# Patient Record
Sex: FEMALE | Race: WHITE | Employment: PART TIME | URBAN - METROPOLITAN AREA
[De-identification: names, ages, dates, MRNs, and addresses within clinical notes are randomized per-mention and may not be internally consistent; named-entity substitution may affect disease eponyms.]

---

## 2017-04-10 ENCOUNTER — TELEPHONE (OUTPATIENT)
Dept: GASTROENTEROLOGY | Facility: CLINIC | Age: 67
End: 2017-04-10

## 2017-04-10 NOTE — TELEPHONE ENCOUNTER
Dr. Ca Black- you last saw pt for CLN on 10/16/15; please see below email from pt with update and advise; thanks!

## 2017-04-10 NOTE — TELEPHONE ENCOUNTER
Danielle Sanford - Appointment Request >','<< Less Detail',event)\" href=\"javascript:;\">More Detail >>     Appointment Request     Tamar Shaffer     Sent: Mon April 10, 2017  6:03 AM      Tamar Shaffer     MRN: [id number] : 1950     Pt Work

## 2017-04-12 RX ORDER — MESALAMINE 400 MG/1
800 CAPSULE, DELAYED RELEASE ORAL
Qty: 180 CAPSULE | Refills: 11 | Status: SHIPPED | OUTPATIENT
Start: 2017-04-12 | End: 2017-06-03 | Stop reason: CLARIF

## 2017-04-12 NOTE — TELEPHONE ENCOUNTER
Pt is contacted to verify correct pharmacy for Prednisone rx.; this is [facility] in [place]; pt will book F/U appt via [address] within 2 months.

## 2017-04-12 NOTE — TELEPHONE ENCOUNTER
Pt contacted, states she has diarrhea, rectal bleeding, Thinks she is having a flare. She stopped her imuran and delzicol \" a long time ago\".  GI RN please call in Rx for prednisone 10mg # 90 si po daily x 2 weeks, then 3 po daily x 10 days, then 2 po

## 2017-04-12 NOTE — TELEPHONE ENCOUNTER
Dr. Sharita Walker- please adjust quantity on Prednisone 10 mg tablets upwards since #90 is not enough for this course; thanks!

## 2017-04-14 ENCOUNTER — TELEPHONE (OUTPATIENT)
Dept: GASTROENTEROLOGY | Facility: CLINIC | Age: 67
End: 2017-04-14

## 2017-04-14 DIAGNOSIS — D64.9 ANEMIA, UNSPECIFIED: Primary | ICD-10-CM

## 2017-04-19 NOTE — TELEPHONE ENCOUNTER
Pt confirmed below communication and is made aware that message will be sent to Dr. Vangie Perera for lab orders.

## 2017-04-21 NOTE — TELEPHONE ENCOUNTER
Orders have already been entered for cbc, ferritin,cmp, tsh, vitamin D, . Iron and TIBC added. I do not believe LDH is indicated at this time.

## 2017-04-26 ENCOUNTER — PATIENT MESSAGE (OUTPATIENT)
Dept: DERMATOLOGY CLINIC | Facility: CLINIC | Age: 67
End: 2017-04-26

## 2017-04-26 NOTE — TELEPHONE ENCOUNTER
From: Prashant Parent  To: Jennifer Pastrana MD  Sent: 4/26/2017 3:50 PM CDT  Subject: Prescription Question    Hi Dr Shefali Mar,  I'm near the end of a little flare with my ulcerative colitis & have been monitoring my ankle area closely because I feel some i

## 2017-04-27 ENCOUNTER — PATIENT MESSAGE (OUTPATIENT)
Dept: DERMATOLOGY CLINIC | Facility: CLINIC | Age: 67
End: 2017-04-27

## 2017-04-27 NOTE — TELEPHONE ENCOUNTER
From: Elodia Ackerman  To: Susan Casiano MD  Sent: 4/27/2017 12:15 PM CDT  Subject: Prescription Question    Attn: Ramirez Contreras  I could not reply to your email response of today (in the format it was sent) recommending \"applying clobetasol ointment t

## 2017-04-27 NOTE — TELEPHONE ENCOUNTER
Yes- would definitely recommend use of clobetasol oint to the area bid- if pt needs refill may give 30 gm + 1 rf

## 2017-04-29 ENCOUNTER — LAB ENCOUNTER (OUTPATIENT)
Dept: LAB | Facility: HOSPITAL | Age: 67
End: 2017-04-29
Attending: INTERNAL MEDICINE
Payer: COMMERCIAL

## 2017-04-29 DIAGNOSIS — D64.9 ANEMIA, UNSPECIFIED: ICD-10-CM

## 2017-04-29 DIAGNOSIS — Z00.00 HEALTHCARE MAINTENANCE: ICD-10-CM

## 2017-04-29 PROCEDURE — 83540 ASSAY OF IRON: CPT

## 2017-04-29 PROCEDURE — 80053 COMPREHEN METABOLIC PANEL: CPT

## 2017-04-29 PROCEDURE — 84439 ASSAY OF FREE THYROXINE: CPT

## 2017-04-29 PROCEDURE — 84443 ASSAY THYROID STIM HORMONE: CPT

## 2017-04-29 PROCEDURE — 84466 ASSAY OF TRANSFERRIN: CPT

## 2017-04-29 PROCEDURE — 36415 COLL VENOUS BLD VENIPUNCTURE: CPT

## 2017-04-29 PROCEDURE — 85025 COMPLETE CBC W/AUTO DIFF WBC: CPT

## 2017-04-29 PROCEDURE — 80061 LIPID PANEL: CPT

## 2017-04-29 PROCEDURE — 82728 ASSAY OF FERRITIN: CPT

## 2017-05-01 ENCOUNTER — TELEPHONE (OUTPATIENT)
Dept: GASTROENTEROLOGY | Facility: CLINIC | Age: 67
End: 2017-05-01

## 2017-05-25 ENCOUNTER — PATIENT MESSAGE (OUTPATIENT)
Dept: INTERNAL MEDICINE CLINIC | Facility: CLINIC | Age: 67
End: 2017-05-25

## 2017-05-27 ENCOUNTER — HOSPITAL ENCOUNTER (EMERGENCY)
Facility: HOSPITAL | Age: 67
Discharge: HOME | End: 2017-05-27
Attending: EMERGENCY MEDICINE
Payer: COMMERCIAL

## 2017-05-27 ENCOUNTER — TELEPHONE (OUTPATIENT)
Dept: INTERNAL MEDICINE CLINIC | Facility: CLINIC | Age: 67
End: 2017-05-27

## 2017-05-27 ENCOUNTER — OFFICE VISIT (OUTPATIENT)
Dept: DERMATOLOGY CLINIC | Facility: CLINIC | Age: 67
End: 2017-05-27

## 2017-05-27 VITALS
WEIGHT: 132 LBS | HEART RATE: 69 BPM | SYSTOLIC BLOOD PRESSURE: 100 MMHG | TEMPERATURE: 99 F | HEIGHT: 68 IN | BODY MASS INDEX: 20 KG/M2 | DIASTOLIC BLOOD PRESSURE: 52 MMHG | RESPIRATION RATE: 17 BRPM | OXYGEN SATURATION: 98 %

## 2017-05-27 DIAGNOSIS — N81.3 COMPLETE UTERINE PROLAPSE: Primary | ICD-10-CM

## 2017-05-27 DIAGNOSIS — T38.0X5A STEROID ACNE: Primary | ICD-10-CM

## 2017-05-27 DIAGNOSIS — L70.8 STEROID ACNE: Primary | ICD-10-CM

## 2017-05-27 DIAGNOSIS — K59.00 CONSTIPATION, UNSPECIFIED CONSTIPATION TYPE: ICD-10-CM

## 2017-05-27 PROCEDURE — 80048 BASIC METABOLIC PNL TOTAL CA: CPT | Performed by: EMERGENCY MEDICINE

## 2017-05-27 PROCEDURE — 99213 OFFICE O/P EST LOW 20 MIN: CPT | Performed by: DERMATOLOGY

## 2017-05-27 PROCEDURE — 85025 COMPLETE CBC W/AUTO DIFF WBC: CPT | Performed by: EMERGENCY MEDICINE

## 2017-05-27 PROCEDURE — 99284 EMERGENCY DEPT VISIT MOD MDM: CPT

## 2017-05-27 PROCEDURE — 99212 OFFICE O/P EST SF 10 MIN: CPT | Performed by: DERMATOLOGY

## 2017-05-27 PROCEDURE — 36415 COLL VENOUS BLD VENIPUNCTURE: CPT

## 2017-05-27 RX ORDER — SULFACETAMIDE SODIUM 100 MG/ML
1 LOTION TOPICAL DAILY
Qty: 1 BOTTLE | Refills: 3 | Status: SHIPPED | OUTPATIENT
Start: 2017-05-27 | End: 2017-09-21 | Stop reason: ALTCHOICE

## 2017-05-27 RX ORDER — CLINDAMYCIN PHOSPHATE AND BENZOYL PEROXIDE 10; 50 MG/G; MG/G
1 GEL TOPICAL DAILY
Qty: 1 TUBE | Refills: 3 | Status: SHIPPED | OUTPATIENT
Start: 2017-05-27 | End: 2017-09-21 | Stop reason: ALTCHOICE

## 2017-05-27 RX ORDER — POLYETHYLENE GLYCOL 3350 17 G/17G
17 POWDER, FOR SOLUTION ORAL DAILY PRN
Qty: 12 EACH | Refills: 0 | Status: SHIPPED | OUTPATIENT
Start: 2017-05-27 | End: 2017-06-03 | Stop reason: CLARIF

## 2017-05-27 NOTE — ED NOTES
Care assumed from triage pt verbalizes concern for uterine prolapse secondary to constipation/Chron's flare.  Currently on prednisone taper

## 2017-05-27 NOTE — PROGRESS NOTES
Past Medical History   Diagnosis Date   • Ulcerative colitis ([facility] [address] 1990s, 2001   • Colon polyps    • Benign tumor of breast 1969     biopsy   • Breast lump      biopsy, benign   • Nasal fracture    • Pyoderma          Past Surgical History    BREAST SURGER

## 2017-05-27 NOTE — PROGRESS NOTES
HPI:     Chief Complaint     Rash        HPI     Rash    Additional comments: check red bumpy eruption in face, had this for about 7 days, itching at the beginning       Last edited by Letitia Pinzon RN on 5/27/2017  9:19 AM. (History)         of note shamir DAILY BEFORE MEALS Disp: 180 capsule Rfl: 11   mesalamine (DELZICOL) 400 MG Oral Capsule Delayed Release Take 2 capsules (800 mg total) by mouth 3 (three) times daily before meals.  Disp: 180 capsule Rfl: 11   mesalamine (DELZICOL) 400 MG Oral Capsule Delay h/o       PHYSICAL EXAM:   Patient is alert and oriented and appears their stated age. They are well-nourished. Exam is remarkable for the following-  1.   There are some resolving what appeared to be inflammatory somewhat acneiform papules on the same st

## 2017-05-27 NOTE — TELEPHONE ENCOUNTER
From: Reddy Martino  To: Linda Mohamud MD  Sent: 5/25/2017 2:46 AM CDT  Subject: Visit Follow-up Question    Dr Fabiano Tyson, [place] late [place].  During this vacation time I managed to push some body part out due to so

## 2017-05-27 NOTE — ED INITIAL ASSESSMENT (HPI)
Had flare up of ulcerative colitis and was straining with bowel movements, thinks she caused a reoccurrence of uterine prolapse, sent by Dr Blanca Finney

## 2017-05-27 NOTE — TELEPHONE ENCOUNTER
Elisabet Harrell      To     [facility] Clinical Staff             Sent     5/25/2017  2:46 AM            Dr Tanesha Davidson, I am [place] late afternoon today from [place].  During this vacation time I managed to push some body part out due to so much strai needs evaluation however long weekend ahead and no access with provider. Pt directed to seek ED for immediate care and F/U after Holiday w/ PCP.   pt agreeable to plan of care    Patient provided with clinic contact information, hours of operation and will

## 2017-05-28 NOTE — ED NOTES
Discharged home with plan to follow up with GYN/GI as indicated. Alert and interactive. Hemodynamically stable. Agrees with pain management/constipation plan.  Ambulates to exit with steady gait

## 2017-05-28 NOTE — ED PROVIDER NOTES
Patient Seen in: [facility] AND [facility] Emergency Department    History   Patient presents with:  Eval-G (gynecologic)    Stated Complaint: Gyne    HPI    79-year-old female with history of ulcerative colitis and prior uterine prolapse presents with concern f D3) Oral Tab,  Take 1 tablet by mouth daily. mesalamine (DELZICOL) 400 MG Oral Capsule Delayed Release,  Take 2 capsules (800mg) by mouth three times a day   Ferrous Sulfate  (45 FE) MG Oral Tab CR,  Take 1 tablet by mouth daily.    DELZICOL 400 MG injection  ENT, Mouth: mucous membranes moist  Respiratory: there are no retractions, lungs are clear to auscultation  Cardiovascular: regular rate and rhythm  Gastrointestinal:  abdomen is soft with mild, lower abdominal tenderness, no masses, bowel sound gastroenterologist.      Disposition and Plan     Clinical Impression:  Complete uterine prolapse  (primary encounter diagnosis)  Constipation, unspecified constipation type    Disposition:  Discharge    Follow-up:  Tarah Polanco MD  [address] [place] [address]  [place]

## 2017-05-28 NOTE — ED NOTES
Care assumed from triage alert and interactive with 1 month hx of \"Chron's exacerbation\" Currently on a prednisone taper with 48 hour hx of progressive vaginal protrusion that she feels is secondary to straining to have BM. + uterine prolapse visualized/

## 2017-05-29 NOTE — TELEPHONE ENCOUNTER
SHIRA.  Reviewed ER notes. She needs to see gyne. The ER doctor spoke with Dr. Charles Cantu who plans to fit her for a pessary. She will call Dr. Charles Cantu. She received the phone number from the ER.

## 2017-05-30 ENCOUNTER — OFFICE VISIT (OUTPATIENT)
Dept: GASTROENTEROLOGY | Facility: CLINIC | Age: 67
End: 2017-05-30

## 2017-05-30 ENCOUNTER — TELEPHONE (OUTPATIENT)
Dept: GASTROENTEROLOGY | Facility: CLINIC | Age: 67
End: 2017-05-30

## 2017-05-30 VITALS
TEMPERATURE: 99 F | SYSTOLIC BLOOD PRESSURE: 95 MMHG | HEIGHT: 67.5 IN | BODY MASS INDEX: 20.14 KG/M2 | HEART RATE: 91 BPM | DIASTOLIC BLOOD PRESSURE: 62 MMHG | WEIGHT: 129.81 LBS

## 2017-05-30 DIAGNOSIS — N81.4 UTERINE PROLAPSE: ICD-10-CM

## 2017-05-30 DIAGNOSIS — K51.011 ULCERATIVE PANCOLITIS WITH RECTAL BLEEDING (HCC): Primary | ICD-10-CM

## 2017-05-30 PROCEDURE — 99214 OFFICE O/P EST MOD 30 MIN: CPT | Performed by: INTERNAL MEDICINE

## 2017-05-30 PROCEDURE — 99212 OFFICE O/P EST SF 10 MIN: CPT | Performed by: INTERNAL MEDICINE

## 2017-05-30 RX ORDER — POLYETHYLENE GLYCOL 3350 17 G/17G
3350 POWDER, FOR SOLUTION ORAL DAILY
Refills: 0 | COMMUNITY
Start: 2017-05-27 | End: 2017-06-10

## 2017-05-30 NOTE — PATIENT INSTRUCTIONS
1.  Get stool GI panel checked    2.   Schedule flexible sigmoidoscopy for next Wednesday, Meseret sixth, with conscious sedation

## 2017-05-30 NOTE — TELEPHONE ENCOUNTER
Pt went to [facility] ER this past Saturday, 5/27/2017 due to Constipation and Colitis Flare Up. Pt requesting to be seen sooner than next avail. CSS offered appt to see PB but pt not will to wait for next avail.   Pls call [phone] - requesting to be seen t

## 2017-05-30 NOTE — H&P
History of present illness: This is a 42-year-old female who is well-known to me. She has an history of ulcerative colitis and recent flare. She is currently on a tapering course of prednisone, 10 mg daily.   She has a history of pyoderma gangrenosum, informed consent and elected to proceed with sigmoidsoscopy with possible intervention [i.e. Polypectomy, biopsies, etc.] as indicated.

## 2017-05-30 NOTE — TELEPHONE ENCOUNTER
Pt here in office. Scheduled for:  Flex sig [id number]  Provider Name:ISABELA  Date:  6/7/17  Location:  [facility]  Sedation:  Put down to IV sedation but pt will think about it and let us know if she changes her mind  Time:   No arrival time given to pt.  Aware [facility]

## 2017-05-30 NOTE — PROGRESS NOTES
HPI:    Patient ID: Elodia Ackerman is a 73year old female. HPI    Review of Systems   Constitutional: Positive for appetite change, fatigue and unexpected weight change. Negative for fever, chills, diaphoresis and activity change.         She believes MG Oral Tab Take 1 tablet each am till wound is healed, then 1/2 tablet each am for 1 week, then 1/2 tab every other day for 2 weeks Disp: 50 tablet Rfl: 0   DELZICOL 400 MG Oral Capsule Delayed Release TAKE 2 CAPSULES BY MOUTH THREE TIMES DAILY BEFORE OLE hepatosplenomegaly. There is no tenderness. There is no CVA tenderness. No hernia. Abdomen flat, soft, nondistended, nontender. No masses or hepatosplenomegaly. Musculoskeletal: Normal range of motion. She exhibits edema and tenderness.    Small area o

## 2017-05-31 ENCOUNTER — PATIENT MESSAGE (OUTPATIENT)
Dept: DERMATOLOGY CLINIC | Facility: CLINIC | Age: 67
End: 2017-05-31

## 2017-05-31 ENCOUNTER — PATIENT MESSAGE (OUTPATIENT)
Dept: GASTROENTEROLOGY | Facility: CLINIC | Age: 67
End: 2017-05-31

## 2017-06-01 ENCOUNTER — TELEPHONE (OUTPATIENT)
Dept: OBGYN CLINIC | Facility: CLINIC | Age: 67
End: 2017-06-01

## 2017-06-01 ENCOUNTER — TELEPHONE (OUTPATIENT)
Dept: INTERNAL MEDICINE CLINIC | Facility: CLINIC | Age: 67
End: 2017-06-01

## 2017-06-01 NOTE — TELEPHONE ENCOUNTER
From: Jc Robb  To: Nora Basurto MD  Sent: 5/31/2017 10:49 PM CDT  Subject: Prescription Question    Dr Sweta Arreguin, my 2 Rx did not arrive until yesterday & today at [facility].  In the meantime, I had been applying the Elidel cream. Please confirm

## 2017-06-01 NOTE — TELEPHONE ENCOUNTER
Patient is having a lot of discomfort. And is requesting a call back. See patient's my chart      Attn:  Ananya   Per your recommendation I went to ER on Sat, 5/27.  I was released with diagnosis of \"Complete uterine prolapse & Constipation\".  I am disa

## 2017-06-01 NOTE — TELEPHONE ENCOUNTER
Spoke to pt, states that she was seen in ER and referred to Dr. Vann Meth office. She was told to contact the Gyne Dr. that was on call the night she was in ER and was advised to call another gyne office.      Per ER visit notes from 5/27/17:   HAKEEM      The

## 2017-06-01 NOTE — TELEPHONE ENCOUNTER
Traci with IM is calling to get the pt in sooner with Dr Charles Cantu. The pt was see at the ER on 5/27/17 for a prolapse, and is in a lot of pain. Please advise.

## 2017-06-01 NOTE — TELEPHONE ENCOUNTER
Pt has GI again, unsure what to do since she is so uncomfortable. Spoke with our manager, Lilibeth Bob. We have to wait for [facility] to see when we can squeeze pt in since she is a new pt. Pt informed and verbalizes understanding.  Pt is upset because she called and t

## 2017-06-01 NOTE — TELEPHONE ENCOUNTER
[facility] can this pt be seen in 10 min appt on 6/5? Will you see sooner? Pt states ER doc spoke with you directly about this pt (prolapse).  See [employer]

## 2017-06-01 NOTE — TELEPHONE ENCOUNTER
Dr. Lucas Valdez informed of pt's concerns. Advise from Dr. Lucas Valdez passed to pt via payever as . . .    I spoke with Dr. Lucas Valdez regarding your payever message. Your are correct about the application of the medications prescribed by Dr. Lucas Valdez.   She adv

## 2017-06-01 NOTE — TELEPHONE ENCOUNTER
Patient called back attempting to make appt with Dr Jose Lee.  See other 6/1/17 encounter, Dr Patsy Maloney staff will contact the patient

## 2017-06-02 ENCOUNTER — OFFICE VISIT (OUTPATIENT)
Dept: OBGYN CLINIC | Facility: CLINIC | Age: 67
End: 2017-06-02

## 2017-06-02 VITALS — DIASTOLIC BLOOD PRESSURE: 59 MMHG | SYSTOLIC BLOOD PRESSURE: 95 MMHG | HEART RATE: 77 BPM

## 2017-06-02 DIAGNOSIS — N81.3 COMPLETE UTERINE PROLAPSE: Primary | ICD-10-CM

## 2017-06-02 DIAGNOSIS — N94.9 GENITAL LESION, FEMALE: ICD-10-CM

## 2017-06-02 DIAGNOSIS — N81.4 UTERINE PROLAPSE: ICD-10-CM

## 2017-06-02 PROCEDURE — 99243 OFF/OP CNSLTJ NEW/EST LOW 30: CPT | Performed by: OBSTETRICS & GYNECOLOGY

## 2017-06-02 PROCEDURE — A4562 PESSARY, NON RUBBER,ANY TYPE: HCPCS | Performed by: OBSTETRICS & GYNECOLOGY

## 2017-06-02 PROCEDURE — 57160 INSERT PESSARY/OTHER DEVICE: CPT | Performed by: OBSTETRICS & GYNECOLOGY

## 2017-06-02 NOTE — TELEPHONE ENCOUNTER
Spoke with [facility] in office who stated that pt can be added on at 11:30 today. Pt informed and accepted appt. Pt informed that [facility] is on call this afternoon so if there are any emergencies or deliveries she will need to run over to [facility].  Pt verbalized Doris

## 2017-06-02 NOTE — TELEPHONE ENCOUNTER
GI RN, please convey the following to the patient:  it will be okay to proceed with flexible sigmoidoscopy as we have discussed. I would continue MiraLAX daily until the flexible sigmoidoscopy.   I think you should be re-evaluated at [facility] or other [facility]

## 2017-06-02 NOTE — TELEPHONE ENCOUNTER
----- Message from [address] [place] [address] [address] Generic sent at 5/31/2017 11:27 PM CDT -----  Regarding: Other  Contact: [phone]  Dr Mike Marr, I have determined that the \"constipation\" issue I had mentioned to you is due to the \"complete uterine prolapse\".   I am not able

## 2017-06-03 ENCOUNTER — APPOINTMENT (OUTPATIENT)
Dept: LAB | Facility: HOSPITAL | Age: 67
End: 2017-06-03
Attending: INTERNAL MEDICINE
Payer: COMMERCIAL

## 2017-06-03 ENCOUNTER — OFFICE VISIT (OUTPATIENT)
Dept: INTERNAL MEDICINE CLINIC | Facility: CLINIC | Age: 67
End: 2017-06-03

## 2017-06-03 VITALS
HEIGHT: 67.5 IN | BODY MASS INDEX: 19.7 KG/M2 | SYSTOLIC BLOOD PRESSURE: 103 MMHG | DIASTOLIC BLOOD PRESSURE: 60 MMHG | WEIGHT: 127 LBS | HEART RATE: 85 BPM

## 2017-06-03 DIAGNOSIS — L08.9 INFECTED SEBACEOUS CYST: Primary | ICD-10-CM

## 2017-06-03 DIAGNOSIS — K51.011 ULCERATIVE PANCOLITIS WITH RECTAL BLEEDING (HCC): ICD-10-CM

## 2017-06-03 DIAGNOSIS — L72.3 INFECTED SEBACEOUS CYST: Primary | ICD-10-CM

## 2017-06-03 PROCEDURE — 99212 OFFICE O/P EST SF 10 MIN: CPT | Performed by: INTERNAL MEDICINE

## 2017-06-03 PROCEDURE — 87507 IADNA-DNA/RNA PROBE TQ 12-25: CPT

## 2017-06-03 PROCEDURE — 99213 OFFICE O/P EST LOW 20 MIN: CPT | Performed by: INTERNAL MEDICINE

## 2017-06-03 RX ORDER — CLINDAMYCIN HYDROCHLORIDE 300 MG/1
300 CAPSULE ORAL 3 TIMES DAILY
Qty: 9 CAPSULE | Refills: 0 | Status: SHIPPED | OUTPATIENT
Start: 2017-06-03 | End: 2017-06-06

## 2017-06-03 NOTE — PROGRESS NOTES
Mariana Fernández is a 73year old female. Patient presents with:  Lump: Pt c/o a lump on her back for about a week    HPI:   About 1 week ago, she noticed an enlarging painful lump on her left upper back near her shoulder blade.   Earlier today, she had spo very minimally- one drink per weekend;                 socially         EXAM:   GENERAL: Pleasant female appearing well in no distress  /60 mmHg  Pulse 85  Ht 5' 7.5\" (1.715 m)  Wt 127 lb (57.607 kg)  BMI 19.59 kg/m2  BACK: Approximately 18 mm tende

## 2017-06-03 NOTE — PATIENT INSTRUCTIONS
Please apply Duac topically, use a warm compress 3 times daily, and also apply a dry dressing. If symptoms persist, please take clindamycin 300 mg 3 times daily for 3 days. Call if no better.

## 2017-06-05 ENCOUNTER — OFFICE VISIT (OUTPATIENT)
Dept: OBGYN CLINIC | Facility: CLINIC | Age: 67
End: 2017-06-05

## 2017-06-05 VITALS
WEIGHT: 128 LBS | SYSTOLIC BLOOD PRESSURE: 109 MMHG | DIASTOLIC BLOOD PRESSURE: 58 MMHG | HEART RATE: 83 BPM | BODY MASS INDEX: 20 KG/M2

## 2017-06-05 DIAGNOSIS — N81.3 COMPLETE UTERINE PROLAPSE: Primary | ICD-10-CM

## 2017-06-05 PROCEDURE — 99213 OFFICE O/P EST LOW 20 MIN: CPT | Performed by: OBSTETRICS & GYNECOLOGY

## 2017-06-05 NOTE — PROGRESS NOTES
Sanchez Smith is a 73year old female  No LMP recorded. Patient is postmenopausal. Patient presents with: Other: Pessary check -- taking clindamycin orally now for her back; pessary still in place  .      OBSTETRICS HISTORY:  Obstetric History External Gel, Apply 1 Application topically daily. , Disp: 1 Tube, Rfl: 3  •  Sulfacetamide Sodium, Acne, (KLARON) 10 % External Lotion, Apply 1 Application topically daily. , Disp: 1 Bottle, Rfl: 3  •  Pimecrolimus (ELIDEL) 1 % External Cream, Apply 1 Appli hemorroids   Lymph node: no inguinal lymph nodes    Assessment & Plan:  Arnell Fleischer was seen today for other.     Diagnoses and all orders for this visit:    Complete uterine prolapse          No prescriptions requested or ordered in this encounter    Reviewed

## 2017-06-05 NOTE — PROCEDURES
Pessary Fitting      Consent signed. Procedure discussed with patient in detail including indication, risk, benefits, alternatives and complications.     Problems:  Discomfort, perineal tear vs ulcer noted -- HSV cx done    Procedure:  Pessary Type: Ring w

## 2017-06-05 NOTE — PROGRESS NOTES
Tamar Shaffer is a 73year old female  No LMP recorded.  Patient is postmenopausal. Patient presents with:  Gyn Problem: UTERINE PROLAPSE -- new patient --PCP Terrance -- in ER few days ago due to discomfort-- Sx in last 3 wks of lower pelvic press •  Clindamycin HCl 300 MG Oral Cap, Take 1 capsule (300 mg total) by mouth 3 (three) times daily. , Disp: 9 capsule, Rfl: 0  •  Polyethylene Glycol 3350 Oral Powder, Take 3,350 g by mouth daily. , Disp: , Rfl: 0  •  Clindamycin Phos-Benzoyl Perox (DUAC) 1.2- External Genitalia: normal appearance, hair distribution, and no lesions  Urethral Meatus:  normal in size, location, without lesions and prolapse  Bladder:  No fullness, masses or tenderness  Vagina:  Normal appearance without lesions, no abnormal dischar

## 2017-06-07 ENCOUNTER — TELEPHONE (OUTPATIENT)
Dept: GASTROENTEROLOGY | Facility: CLINIC | Age: 67
End: 2017-06-07

## 2017-06-07 ENCOUNTER — LAB REQUISITION (OUTPATIENT)
Dept: LAB | Facility: HOSPITAL | Age: 67
End: 2017-06-07
Payer: COMMERCIAL

## 2017-06-07 DIAGNOSIS — Z01.89 ENCOUNTER FOR OTHER SPECIFIED SPECIAL EXAMINATIONS: ICD-10-CM

## 2017-06-07 PROCEDURE — 88342 IMHCHEM/IMCYTCHM 1ST ANTB: CPT | Performed by: INTERNAL MEDICINE

## 2017-06-07 PROCEDURE — 87493 C DIFF AMPLIFIED PROBE: CPT | Performed by: INTERNAL MEDICINE

## 2017-06-07 PROCEDURE — 88305 TISSUE EXAM BY PATHOLOGIST: CPT | Performed by: INTERNAL MEDICINE

## 2017-06-07 NOTE — TELEPHONE ENCOUNTER
Post flexible sigmoidoscopy:    Severe proctocolitis, r/o cmv, r/o c. dificile    Rec : check path, cdif, possible biologic agent

## 2017-06-08 ENCOUNTER — PATIENT MESSAGE (OUTPATIENT)
Dept: INTERNAL MEDICINE CLINIC | Facility: CLINIC | Age: 67
End: 2017-06-08

## 2017-06-08 ENCOUNTER — PATIENT MESSAGE (OUTPATIENT)
Dept: GASTROENTEROLOGY | Facility: CLINIC | Age: 67
End: 2017-06-08

## 2017-06-08 DIAGNOSIS — K52.9 IBD (INFLAMMATORY BOWEL DISEASE): Primary | ICD-10-CM

## 2017-06-09 ENCOUNTER — TELEPHONE (OUTPATIENT)
Dept: GASTROENTEROLOGY | Facility: CLINIC | Age: 67
End: 2017-06-09

## 2017-06-09 ENCOUNTER — TELEPHONE (OUTPATIENT)
Dept: FAMILY MEDICINE CLINIC | Facility: CLINIC | Age: 67
End: 2017-06-09

## 2017-06-09 DIAGNOSIS — Z87.2 H/O PYODERMA GANGRENOSUM: Primary | ICD-10-CM

## 2017-06-09 NOTE — TELEPHONE ENCOUNTER
Patient called our office; (sent "[employer] message to [facility], GI, and Junior)  states she saw DR Ziggy Robert about 2 weeks ago for cyst on upper mid back and tx with clindamycin; confirmed new cysts appeared on buttock, left axilla, and one on leg.  Has redness around wound    Care Advice Discussed:  * Expected Course  * Reasons To Call Back      - Redness starts to spread      - Pus, drainage, or fever occurs    ----------------------

## 2017-06-10 ENCOUNTER — OFFICE VISIT (OUTPATIENT)
Dept: INTERNAL MEDICINE CLINIC | Facility: CLINIC | Age: 67
End: 2017-06-10

## 2017-06-10 VITALS
SYSTOLIC BLOOD PRESSURE: 100 MMHG | OXYGEN SATURATION: 98 % | BODY MASS INDEX: 19.95 KG/M2 | DIASTOLIC BLOOD PRESSURE: 58 MMHG | WEIGHT: 128.63 LBS | HEART RATE: 92 BPM | HEIGHT: 67.5 IN | TEMPERATURE: 98 F

## 2017-06-10 DIAGNOSIS — L88 PYODERMA GANGRENOSUM: Primary | ICD-10-CM

## 2017-06-10 PROCEDURE — 99213 OFFICE O/P EST LOW 20 MIN: CPT | Performed by: INTERNAL MEDICINE

## 2017-06-10 PROCEDURE — 99214 OFFICE O/P EST MOD 30 MIN: CPT | Performed by: INTERNAL MEDICINE

## 2017-06-10 NOTE — PATIENT INSTRUCTIONS
1. Await cultures. 2.  Keep bandaged area was cleansed. 3.  Call back with any fevers or chills or worsening discharge. Ulcerative Colitis  You have been diagnosed with ulcerative colitis.  Ulcerative colitis is a chronic condition that causes inflammat · Try avoiding dairy products if you feel you are sensitive to lactose. · Try cutting out foods that are high in fat and fatty meats. · You can control bloating and passing excess gas.  Be careful with \"gassy\" vegetables and fruits like beans, cabbage, If X-rays were done, a radiologist will look at them. You will be told if you need a change in treatment  It is very important to tell your doctor if you intend to get pregnant, or find out you are pregnant.  You will need to discuss your disease, medicines

## 2017-06-10 NOTE — PROGRESS NOTES
Patient ID: Jose Rafael Mitchell is a 73year old female. Patient presents with:  Derm Problem: Back, butt, underarm, leg       HISTORY OF PRESENT ILLNESS:   HPI  Patient presents for above. Patient with multiple leg boils on her legs back and buttock.   Aysha Caro •  Pimecrolimus (ELIDEL) 1 % External Cream, Apply 1 Application topically 2 (two) times daily. , Disp: 100 g, Rfl: 0  •  DELZICOL 400 MG Oral Capsule Delayed Release, TAKE 2 CAPSULES BY MOUTH THREE TIMES DAILY BEFORE MEALS, Disp: 180 capsule, Rfl: 11  •  F · Patient is to call back if she begins to have fevers or chills or worsening discharge from wounds. · Keep follow-up as scheduled. No Follow-up on file.     25 minutes spent with patient  10 minutes chart review, counseling, documentation, and coordina

## 2017-06-11 ENCOUNTER — HOSPITAL ENCOUNTER (EMERGENCY)
Facility: HOSPITAL | Age: 67
Discharge: HOME | End: 2017-06-11
Attending: EMERGENCY MEDICINE
Payer: COMMERCIAL

## 2017-06-11 VITALS
DIASTOLIC BLOOD PRESSURE: 55 MMHG | TEMPERATURE: 99 F | BODY MASS INDEX: 19.55 KG/M2 | HEIGHT: 68 IN | RESPIRATION RATE: 20 BRPM | WEIGHT: 129 LBS | OXYGEN SATURATION: 97 % | HEART RATE: 77 BPM | SYSTOLIC BLOOD PRESSURE: 100 MMHG

## 2017-06-11 DIAGNOSIS — L88 PYODERMA GANGRENOSA: Primary | ICD-10-CM

## 2017-06-11 PROCEDURE — 80048 BASIC METABOLIC PNL TOTAL CA: CPT | Performed by: EMERGENCY MEDICINE

## 2017-06-11 PROCEDURE — 99284 EMERGENCY DEPT VISIT MOD MDM: CPT

## 2017-06-11 PROCEDURE — 80076 HEPATIC FUNCTION PANEL: CPT | Performed by: EMERGENCY MEDICINE

## 2017-06-11 PROCEDURE — 96374 THER/PROPH/DIAG INJ IV PUSH: CPT

## 2017-06-11 PROCEDURE — 85025 COMPLETE CBC W/AUTO DIFF WBC: CPT | Performed by: EMERGENCY MEDICINE

## 2017-06-11 RX ORDER — MYCOPHENOLATE MOFETIL 500 MG/1
1000 TABLET ORAL 2 TIMES DAILY
Qty: 56 TABLET | Refills: 0 | Status: SHIPPED | OUTPATIENT
Start: 2017-06-11 | End: 2017-06-25

## 2017-06-11 RX ORDER — ACETAMINOPHEN 500 MG
1000 TABLET ORAL ONCE
Status: COMPLETED | OUTPATIENT
Start: 2017-06-11 | End: 2017-06-11

## 2017-06-11 RX ORDER — MYCOPHENOLATE MOFETIL 250 MG/1
1000 CAPSULE ORAL ONCE
Status: COMPLETED | OUTPATIENT
Start: 2017-06-11 | End: 2017-06-11

## 2017-06-11 RX ORDER — METHYLPREDNISOLONE SODIUM SUCCINATE 125 MG/2ML
60 INJECTION, POWDER, LYOPHILIZED, FOR SOLUTION INTRAMUSCULAR; INTRAVENOUS ONCE
Status: COMPLETED | OUTPATIENT
Start: 2017-06-11 | End: 2017-06-11

## 2017-06-11 RX ORDER — HYDROCODONE BITARTRATE AND ACETAMINOPHEN 5; 325 MG/1; MG/1
1-2 TABLET ORAL EVERY 4 HOURS PRN
Qty: 20 TABLET | Refills: 0 | Status: SHIPPED | OUTPATIENT
Start: 2017-06-11 | End: 2017-06-18

## 2017-06-11 RX ORDER — PREDNISONE 20 MG/1
TABLET ORAL
Qty: 35 TABLET | Refills: 0 | Status: SHIPPED | OUTPATIENT
Start: 2017-06-11 | End: 2017-09-21 | Stop reason: ALTCHOICE

## 2017-06-11 NOTE — ED PROVIDER NOTES
Patient presents with:  Wound    Stated Complaint: Draining lesions on back    HPI  Patient complains of multiple painful necrotic lesions. Patient suffers from ulcerative colitis was just weaned off of steroids.   Last week began to develop what looked li TIMES DAILY BEFORE MEALS       Family History   Problem Relation Age of Onset   • Diabetes Father    • Pacemaker Father    • Heart Disease Father    • Melanoma Father    • Diabetes Mother      DM   • Cancer Mother      leukemia   • Cancer Maternal Grandmot lateral to this is another 3 x 3 cm ulcerated lesion with serous drainage, surrounding erythema, left anterior lower leg there is a 3 x 2 cm bolus lesion with surrounding erythema, there is also a 2 x 1 cm blistered lesion on the left lateral heel  PSYCH: diagnosis)    Disposition:  Discharge    Follow-up:  Shahid Vasquez, [address] [facility] Sasha Caceres, [address] [facility] [address]  [phone]            Medications Prescribed:  Cu

## 2017-06-11 NOTE — TELEPHONE ENCOUNTER
From: Arianna Barros  To: Gonzalez Suggs MD  Sent: 6/8/2017 10:55 PM CDT  Subject: Visit Follow-up Question    Same msg to Abbie Aguayo Angry:    I saw Dr Shanthi Christine on Sat, 6/3 & it was determined at that time that I had a cyst on my back th

## 2017-06-11 NOTE — DISCHARGE PLANNING
Met with patient as requested per ER doctor, pt w/ multiple lesions, some necrotic to back area and to RLE. DX: pyoderma gangrenosa.   Pt is ambulatory at this time and prefers Wound clinic appt, I left vm to contact pt and provided w/two phone numbers t

## 2017-06-11 NOTE — ED INITIAL ASSESSMENT (HPI)
2 lesions on back and on L leg, diagnosed as pyoderma associated with ulcerative colitis. Wounds are weeping and associated with pain.  PCP informed her to come to ED

## 2017-06-11 NOTE — TELEPHONE ENCOUNTER
The patient contacted me as the on-call physician today. She relates worsening and severe pain of her pyoderma. She cannot \"even put a Band-Aid on them\". Acetaminophen has not helped. She requests either a stronger topical or oral pain medication.   I

## 2017-06-12 NOTE — TELEPHONE ENCOUNTER
Noted. I see order in mychart encounter from Friday which was not picked up yet.  Form placed on MD desk to review and sign    Will need approval prior to starting but will try to get ASAP

## 2017-06-12 NOTE — TELEPHONE ENCOUNTER
GI RN, as per previous communication from Friday, please get the patient set up for Remicade infusions as soon as possible. Please tell her she will not be able to take CellCept at the same time as Remicade.   She can stop that when the Remicade is started

## 2017-06-12 NOTE — TELEPHONE ENCOUNTER
Pt advised of referral.  I also called the wound care clinic. Pt already has an initial visit tomorrow.

## 2017-06-12 NOTE — TELEPHONE ENCOUNTER
Patient was seen in the ED on 6/11/17 for her Pyoderma gangrenosum. Was placed on Prednisone and Norco.    Is also following up with dermatology.      Patient is unable to address her back wounds and is requesting wound care visit which she has an appoint

## 2017-06-13 ENCOUNTER — TELEPHONE (OUTPATIENT)
Dept: INTERNAL MEDICINE CLINIC | Facility: CLINIC | Age: 67
End: 2017-06-13

## 2017-06-13 ENCOUNTER — NURSE ONLY (OUTPATIENT)
Dept: WOUND CARE | Facility: HOSPITAL | Age: 67
End: 2017-06-13
Attending: NURSE PRACTITIONER
Payer: COMMERCIAL

## 2017-06-13 DIAGNOSIS — L88 PYODERMA GANGRENOSA: ICD-10-CM

## 2017-06-13 DIAGNOSIS — Z87.2 HISTORY OF PYODERMA GANGRENOSUM: Primary | ICD-10-CM

## 2017-06-13 PROBLEM — K51.011 ULCERATIVE CHRONIC PANCOLITIS WITH RECTAL BLEEDING (HCC): Status: ACTIVE | Noted: 2017-06-13

## 2017-06-13 PROCEDURE — 99215 OFFICE O/P EST HI 40 MIN: CPT

## 2017-06-13 PROCEDURE — 99211 OFF/OP EST MAY X REQ PHY/QHP: CPT | Performed by: CLINICAL NURSE SPECIALIST

## 2017-06-13 RX ORDER — CEPHALEXIN 500 MG/1
500 CAPSULE ORAL 2 TIMES DAILY
Qty: 20 CAPSULE | Refills: 0 | Status: SHIPPED | OUTPATIENT
Start: 2017-06-13 | End: 2017-06-23

## 2017-06-13 NOTE — TELEPHONE ENCOUNTER
Wound care nurse Sergio Waters called about a wound on her left shin and left ankle which looks infected. Culture had not been done of those wounds. She recommends oral antibiotic, but she is not APN. She has an appointment with Dr. Yajaira Contreras on Thursday.   Will

## 2017-06-13 NOTE — PROGRESS NOTES
Chief Complaint  This information was obtained from the patient  The patient is new to the [address] [facility] here for an initial visit for the evaluation and management of non-healing wound(s). pyoderma gangrenosum on back.  Patient was seen by primary on 6/3/17 Former smoker - quit 7/21/04, Marital Status - seperated, Occupation - office admistrator, [address] - 2 son- live out of town, Caffeine Use - very moderate use limited due to diet, Alcohol Use - yes, standard drinks, Smokeless tobacco - former user- quit 2 Trouble concentrating on things, such as reading the newspaper or watching television: 0  Moving or speaking so slowly that other people could have noticed?  Or the opposite - being so fidgety or restless that you have been moving around a lot more than usu Wound #2 Left Axilla is a chronic pyoderma gangrenosum and has received a status of Not Healed. Initial wound encounter measurements are 2.5cm length x 2cm width x 0.1cm depth, with an area of 5 sq cm and a volume of 0.5 cubic cm.  There was no drainage not Active Problems    ICD-10  Z87.2 - Personal history of diseases of the skin and subcutaneous tissue    Wounds examined, measured and photographed. Wound on back, axilla and leg dressed with acticoat and mepilex.  Buttocks has silvadine applied with dry Corin Jenaro

## 2017-06-14 NOTE — TELEPHONE ENCOUNTER
Pt called inquiring what is the abx keflex for? Reviewed Dr Ca Claudio message from 6/13/17 with patient who verbalized understanding and will comply with md orders.

## 2017-06-15 ENCOUNTER — OFFICE VISIT (OUTPATIENT)
Dept: DERMATOLOGY CLINIC | Facility: CLINIC | Age: 67
End: 2017-06-15

## 2017-06-15 ENCOUNTER — TELEPHONE (OUTPATIENT)
Dept: DERMATOLOGY CLINIC | Facility: CLINIC | Age: 67
End: 2017-06-15

## 2017-06-15 DIAGNOSIS — L88 PYODERMA GANGRENOSA: Primary | ICD-10-CM

## 2017-06-15 PROCEDURE — 99213 OFFICE O/P EST LOW 20 MIN: CPT | Performed by: DERMATOLOGY

## 2017-06-15 PROCEDURE — 99212 OFFICE O/P EST SF 10 MIN: CPT | Performed by: DERMATOLOGY

## 2017-06-15 NOTE — PROGRESS NOTES
HPI:     Chief Complaint     Derm Problem        HPI     Derm Problem    Additional comments: pt here for 3 week f/u steroid acne resolved.   In [address] [place] [address] ER 6/11 and dx of pyoderma made;(due to painful back lesions; received IV steroid and cell cept) doing much po qd for 4 days then20 mg po qd for 4 days then10 mg po qd for 4 days. Disp: 35 tablet Rfl: 0   Clindamycin Phos-Benzoyl Perox (DUAC) 1.2-5 % External Gel Apply 1 Application topically daily.  Disp: 1 Tube Rfl: 3   Pimecrolimus (ELIDEL) 1 % External Cream Reaction to local anesthetic No     Social History Narrative     Family History   Problem Relation Age of Onset   • Diabetes Father    • Pacemaker Father    • Heart Disease Father    • Melanoma Father    • Diabetes Mother      DM   • Cancer Mother any kind of banging of her extremities or trauma as this may induce new lesions. No orders of the defined types were placed in this encounter.        Results From Past 48 Hours:  No results found for this or any previous visit (from the past 48 hour(s))

## 2017-06-15 NOTE — PATIENT INSTRUCTIONS
Take 50 mg prednisone/day for 3 more days- then reduce to 40 mg/day for 1 week, then 30 mg/day for 1 week- then f/u

## 2017-06-15 NOTE — PROGRESS NOTES
Past Medical History   Diagnosis Date   • Ulcerative colitis ([facility] [address] 1990s, 2001   • Colon polyps    • Benign tumor of breast 1969     biopsy   • Breast lump      biopsy, benign   • Nasal fracture    • Pyoderma    • Lipid screening 3/16/2013     per NG

## 2017-06-16 ENCOUNTER — PATIENT MESSAGE (OUTPATIENT)
Dept: DERMATOLOGY CLINIC | Facility: CLINIC | Age: 67
End: 2017-06-16

## 2017-06-16 NOTE — TELEPHONE ENCOUNTER
LA paperwork completed by JOSE LUIS and faxed to [address] of [employer] our Division, attn Laura Pierre, [phone]. Fax  Confirmation received.

## 2017-06-17 NOTE — TELEPHONE ENCOUNTER
From: Radha Ramirez  To: Jordan Alves MD  Sent: 6/16/2017 8:42 PM CDT  Subject: Visit Follow-up Question    Dr Anyi Amaya, sorry to send a 2nd message but you had also advised at my last appt (6/15) that I should take 2 vitamin supplements & I'm not re

## 2017-06-18 ENCOUNTER — PATIENT MESSAGE (OUTPATIENT)
Dept: DERMATOLOGY CLINIC | Facility: CLINIC | Age: 67
End: 2017-06-18

## 2017-06-18 NOTE — H&P
Hi Dr. Cindy Thorpe:  I agree with the current plan to take cellcept for PG, however she should not be on Remicade and CellCept at the same time. Or there should be a quick taper of cellcept after first remicaide infusion.

## 2017-06-19 ENCOUNTER — TELEPHONE (OUTPATIENT)
Dept: OBGYN CLINIC | Facility: CLINIC | Age: 67
End: 2017-06-19

## 2017-06-19 ENCOUNTER — NURSE ONLY (OUTPATIENT)
Dept: WOUND CARE | Facility: HOSPITAL | Age: 67
End: 2017-06-19
Attending: NURSE PRACTITIONER
Payer: COMMERCIAL

## 2017-06-19 DIAGNOSIS — L88 PYODERMA GANGRENOSA: Primary | ICD-10-CM

## 2017-06-19 PROCEDURE — 97597 DBRDMT OPN WND 1ST 20 CM/<: CPT | Performed by: NURSE PRACTITIONER

## 2017-06-19 PROCEDURE — 97597 DBRDMT OPN WND 1ST 20 CM/<: CPT

## 2017-06-19 RX ORDER — NYSTATIN 100000 U/G
CREAM TOPICAL
Status: DISCONTINUED
Start: 2017-06-19 | End: 2017-06-19 | Stop reason: WASHOUT

## 2017-06-19 NOTE — TELEPHONE ENCOUNTER
Pt contacted, informed that Dr Alex Raygoza did complete the FMLA forms, had written that pt is not to return to work until 8/1/2017. Pt notes she is happy with this time frame. Pt denies any further questions or concerns.

## 2017-06-19 NOTE — TELEPHONE ENCOUNTER
From: Reddy Martino  To: Laurie Munoz MD  Sent: 6/18/2017 10:33 PM CDT  Subject: Other    Dr Pippa Walden, at my last appt with you on Thursday, 6/15, I left a 10-page “packet” of forms for your office to complete on my behalf requesting FMLA protection

## 2017-06-19 NOTE — PROGRESS NOTES
Chief Complaint  This information was obtained from the patient  The patient is new to the [address] [facility],[address] here for an initial visit for the evaluation and management of non-healing wound(s). pyoderma gangrenosum on back.  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 Initial wound encounter measurements are 0.2cm length x 0.2cm width, with an area of 0.04 sq cm . There was no drainage noted.  The patient reports a wound pain The patient has seen dermatology last week. Patient will continue with Cellcept and tapering predisone (currently on Day #1 of 40mg x7days). The wounds on the back are less red and raised. Too sensative to touched.  Changed to medihoney on the back and axil Wound #5 (Other) is located on the left, lateral ankle. A selective debridement with a total area debrided of 10.5 sq cm was performed by Gaby Nunez RN. to remove devitalized tissue: clots and slough. Pain control was achieved using Other.  A time out w .Wound Treatment Note  Cleansed wound and periwound with non-cytotoxic agent. using Wound Cleanser Spray (1)   Applied topical agent to base of wound bed. using Medihoney gel (1)   Applied Primary Wound Dressing.  using Mepilex border 4x4 (1), Xeroform 5x9

## 2017-06-19 NOTE — TELEPHONE ENCOUNTER
----- Message from Blanca Cates MD sent at 6/17/2017 11:29 PM CDT -----  Please call pt and inform her of results attached

## 2017-06-19 NOTE — PROGRESS NOTES
Debridement Performed for Assessment: Wound #4 Left, Medial Lower Leg  Performed By: Clinician Leah Erazo RN  Debridement: Selective  Photos  Photo  Time-Out Taken: Yes  Pain Control: Other  Post Debridement Measurements  Length: (cm) 2.5  Width: (cm) Patient is being managed with oral meds by dermatology, we applicate their collaboration on healing these hard to heal wounds.

## 2017-06-20 ENCOUNTER — TELEPHONE (OUTPATIENT)
Dept: DERMATOLOGY CLINIC | Facility: CLINIC | Age: 67
End: 2017-06-20

## 2017-06-20 NOTE — TELEPHONE ENCOUNTER
Form completed and signed by [facility]. Pt notified, she will  at  of [facility] office tomorrow 6/21/2017. Envelope with forms placed at .

## 2017-06-22 ENCOUNTER — OFFICE VISIT (OUTPATIENT)
Dept: WOUND CARE | Facility: HOSPITAL | Age: 67
End: 2017-06-22
Attending: NURSE PRACTITIONER
Payer: COMMERCIAL

## 2017-06-22 DIAGNOSIS — S81.802D LEG WOUND, LEFT, SUBSEQUENT ENCOUNTER: ICD-10-CM

## 2017-06-22 DIAGNOSIS — L89.152 DECUBITUS ULCER OF COCCYX, STAGE 2 (HCC): ICD-10-CM

## 2017-06-22 DIAGNOSIS — L88 PYODERMA GANGRENOSA: Primary | ICD-10-CM

## 2017-06-22 DIAGNOSIS — S21.202D OPEN WOUND OF LEFT BACK WALL OF THORAX WITHOUT PENETRATION INTO THORACIC CAVITY, SUBSEQUENT ENCOUNTER: ICD-10-CM

## 2017-06-22 PROBLEM — S21.202A: Status: ACTIVE | Noted: 2017-06-22

## 2017-06-22 PROBLEM — S81.802A LEG WOUND, LEFT: Status: ACTIVE | Noted: 2017-06-22

## 2017-06-22 PROCEDURE — 29581 APPL MULTLAYER CMPRN SYS LEG: CPT | Performed by: NURSE PRACTITIONER

## 2017-06-22 PROCEDURE — 99212 OFFICE O/P EST SF 10 MIN: CPT | Performed by: NURSE PRACTITIONER

## 2017-06-22 NOTE — PROGRESS NOTES
Subjective    Chief Complaint  This information was obtained from the patient  The patient is new to the [address] [facility],[address] here for an initial visit for the evaluation and management of non-healing wound(s). pyoderma gangrenosum on back.  Patient was seen by prim normal. The temperature of the periwound skin is WNL. Periwound skin does not exhibit signs or symptoms of infection. Wound #2 Left Axilla is a chronic Full Thickness pyoderma gangrenosum and has received a status of Not Healed.  There is a scant amount periwound skin moisture is normal. The periwound skin color is normal. The temperature of the periwound skin is Warm. Periwound skin does not exhibit signs or symptoms of infection.  Local Pulse is Normal.    Assessment    Active Problems    ICD-10  (Encoun (Left Back)  . Wound Treatment Note  Cleansed wound and periwound with non-cytotoxic agent. using Wound Cleanser Spray (1)  Applied topical agent to base of wound bed. using Medihoney gel (1)  Applied Primary Wound Dressing.  using Mepilex Border 3x3 (1), Xe (1)  Compression applied to: using Toe bases to tibial tubercle (1)  Compression used: using Artiflex 10 cm (1), Artiflex 15 cm (1), Comprilan 08 cm (1), Comprilan 10 cm (1)

## 2017-06-23 ENCOUNTER — PATIENT MESSAGE (OUTPATIENT)
Dept: DERMATOLOGY CLINIC | Facility: CLINIC | Age: 67
End: 2017-06-23

## 2017-06-23 NOTE — TELEPHONE ENCOUNTER
From: Didi Starr  To: Sanjuanita Rosenthal MD  Sent: 6/23/2017 8:14 AM CDT  Subject: Prescription Question    Attn: Sonia Gordon  on 6/17 we had discussed my \"revised\" prednisone instructions of my last visit w/ Dr Viktoriya Chamberlain (on 6/15).  After taking my 40mg today,

## 2017-06-26 ENCOUNTER — NURSE ONLY (OUTPATIENT)
Dept: WOUND CARE | Facility: HOSPITAL | Age: 67
End: 2017-06-26
Attending: NURSE PRACTITIONER
Payer: COMMERCIAL

## 2017-06-26 ENCOUNTER — PATIENT MESSAGE (OUTPATIENT)
Dept: DERMATOLOGY CLINIC | Facility: CLINIC | Age: 67
End: 2017-06-26

## 2017-06-26 DIAGNOSIS — S81.802D LEG WOUND, LEFT, SUBSEQUENT ENCOUNTER: ICD-10-CM

## 2017-06-26 DIAGNOSIS — S21.202D OPEN WOUND OF LEFT BACK WALL OF THORAX WITHOUT PENETRATION INTO THORACIC CAVITY, SUBSEQUENT ENCOUNTER: Primary | ICD-10-CM

## 2017-06-26 PROCEDURE — 29581 APPL MULTLAYER CMPRN SYS LEG: CPT

## 2017-06-26 PROCEDURE — 99211 OFF/OP EST MAY X REQ PHY/QHP: CPT

## 2017-06-26 NOTE — PROGRESS NOTES
Header Image    Progress Note Details  Patient Name: Janie Armando  Patient Number: [id number]  Patient YOB: 1950  Date: 6/26/2017  RN: Jada CASTLEA/KAREN/CMA:  Tin Cotto  Physician / Extender: Calderon Davalos  Facility: [facility] Wound #1 Left Back is an acute Full Thickness pyoderma gangrenosum and has received a status of Not Healed. Subsequent wound encounter measurements are 0.5cm length x 0.3cm width x 0.1cm depth, with an area of 0.15 sq cm and a volume of 0.015 cubic cm.  The Wound #4 Left, Medial Lower Leg is a chronic Full Thickness pyoderma gangrenosum and has received a status of Not Healed.  Subsequent wound encounter measurements are 3.5cm length x 2.6cm width x 0.1cm depth, with an area of 9.1 sq cm and a volume of 0.91 c Z87.2: Personal history of diseases of the skin and subcutaneous tissue  L88: Pyoderma gangrenosum  S81.802A: Unspecified open wound, left lower leg, initial encounter  S21.202A: Unspecified open wound of left back wall of thorax without penetration into t .Wound Treatment Note  Cleansed wound and periwound with non-cytotoxic agent. using Wound Cleanser Spray (1)  Applied topical agent to base of wound bed. using Medihoney gel (1)  Applied Primary Wound Dressing.  using Xeroform 5x9 (1)  Applied Secondary Wou

## 2017-06-29 ENCOUNTER — PATIENT MESSAGE (OUTPATIENT)
Dept: GASTROENTEROLOGY | Facility: CLINIC | Age: 67
End: 2017-06-29

## 2017-06-29 ENCOUNTER — APPOINTMENT (OUTPATIENT)
Dept: WOUND CARE | Facility: HOSPITAL | Age: 67
End: 2017-06-29
Attending: NURSE PRACTITIONER
Payer: COMMERCIAL

## 2017-06-29 ENCOUNTER — OFFICE VISIT (OUTPATIENT)
Dept: DERMATOLOGY CLINIC | Facility: CLINIC | Age: 67
End: 2017-06-29

## 2017-06-29 DIAGNOSIS — S21.202D OPEN WOUND OF LEFT BACK WALL OF THORAX WITHOUT PENETRATION INTO THORACIC CAVITY, SUBSEQUENT ENCOUNTER: ICD-10-CM

## 2017-06-29 DIAGNOSIS — Z87.2 HISTORY OF PYODERMA GANGRENOSUM: Primary | ICD-10-CM

## 2017-06-29 DIAGNOSIS — L89.152 DECUBITUS ULCER OF COCCYX, STAGE 2 (HCC): ICD-10-CM

## 2017-06-29 DIAGNOSIS — L88 PYODERMA GANGRENOSA: Primary | ICD-10-CM

## 2017-06-29 DIAGNOSIS — S81.802D LEG WOUND, LEFT, SUBSEQUENT ENCOUNTER: ICD-10-CM

## 2017-06-29 PROCEDURE — 99212 OFFICE O/P EST SF 10 MIN: CPT | Performed by: DERMATOLOGY

## 2017-06-29 PROCEDURE — 99213 OFFICE O/P EST LOW 20 MIN: CPT | Performed by: DERMATOLOGY

## 2017-06-29 PROCEDURE — 99213 OFFICE O/P EST LOW 20 MIN: CPT | Performed by: CLINICAL NURSE SPECIALIST

## 2017-06-29 RX ORDER — TACROLIMUS 1 MG/G
OINTMENT TOPICAL
Qty: 60 G | Refills: 3 | Status: SHIPPED | OUTPATIENT
Start: 2017-06-29 | End: 2017-09-21 | Stop reason: ALTCHOICE

## 2017-06-29 RX ORDER — PREDNISONE 10 MG/1
TABLET ORAL
Qty: 60 TABLET | Refills: 1 | Status: SHIPPED | OUTPATIENT
Start: 2017-06-29 | End: 2017-07-11

## 2017-06-29 RX ORDER — PREDNISONE 1 MG/1
TABLET ORAL
Qty: 50 TABLET | Refills: 1 | Status: SHIPPED | OUTPATIENT
Start: 2017-06-29 | End: 2017-10-31

## 2017-06-29 NOTE — PROGRESS NOTES
HPI:     Chief Complaint     Rash        HPI     Rash    Additional comments: Pt presents for 2 week f/u of pyoderma gangrenosa. Pt notes that left ankle and leg ulcers have grown in size and are very painful.   Pt did see the wound care center nurse felicity along with a 5 mg tab for 1 week, then 2 tabs each am until next visit Disp: 60 tablet Rfl: 1   PREDNISONE 20 MG Oral Tab 60mg po qd for three days then50 mg po qd for 4 days then40 mg po qd for 4 days then30 mg po qd for 4 days then20 mg po qd for 4 days Narrative   None on file     Family History   Problem Relation Age of Onset   • Diabetes Father    • Pacemaker Father    • Heart Disease Father    • Melanoma Father    • Diabetes Mother      DM   • Cancer Mother      leukemia   • Cancer Maternal Grandmothe gangrenosum. No orders of the defined types were placed in this encounter. Results From Past 48 Hours:  No results found for this or any previous visit (from the past 48 hour(s)).     Meds This Visit:      Imaging Orders:  None     Referral Orders:

## 2017-06-30 NOTE — TELEPHONE ENCOUNTER
From: Prashant Parent  To: Sahara Jansen MD  Sent: 6/29/2017 10:16 PM CDT  Subject: Visit Follow-up Question    Dr Rey White,   Dr Shefali Mar has now reduced my prednisone to 30 mg for my pyoderma condition & I will be tapering down further.  I have

## 2017-06-30 NOTE — TELEPHONE ENCOUNTER
Checked status with infusion center and just recently approved.   [facility] medical policy requires predetermination which took weeks    FYI sent to Dr Daria Conner that remicade is approved and they will call pt to set this up

## 2017-07-05 ENCOUNTER — OFFICE VISIT (OUTPATIENT)
Dept: WOUND CARE | Facility: HOSPITAL | Age: 67
End: 2017-07-05
Attending: NURSE PRACTITIONER
Payer: COMMERCIAL

## 2017-07-05 ENCOUNTER — PATIENT MESSAGE (OUTPATIENT)
Dept: GASTROENTEROLOGY | Facility: CLINIC | Age: 67
End: 2017-07-05

## 2017-07-05 ENCOUNTER — TELEPHONE (OUTPATIENT)
Dept: DERMATOLOGY CLINIC | Facility: CLINIC | Age: 67
End: 2017-07-05

## 2017-07-05 DIAGNOSIS — S81.802D LEG WOUND, LEFT, SUBSEQUENT ENCOUNTER: ICD-10-CM

## 2017-07-05 DIAGNOSIS — L88 PYODERMA GANGRENOSA: Primary | ICD-10-CM

## 2017-07-05 DIAGNOSIS — S21.202D OPEN WOUND OF LEFT BACK WALL OF THORAX WITHOUT PENETRATION INTO THORACIC CAVITY, SUBSEQUENT ENCOUNTER: ICD-10-CM

## 2017-07-05 PROCEDURE — 99214 OFFICE O/P EST MOD 30 MIN: CPT | Performed by: NURSE PRACTITIONER

## 2017-07-05 NOTE — TELEPHONE ENCOUNTER
Dr. Bill Lima from wound clinic has sent you epic msg regarding her recc - she is available tomorrow if you wish to speak with her too.  (number below)

## 2017-07-05 NOTE — PROGRESS NOTES
Subjective    Chief Complaint  This information was obtained from the patient  The patient is new to the [address] [facility],[address] here for an initial visit for the evaluation and management of non-healing wound(s). pyoderma gangrenosum on back.  Patient was seen by prim Wound #1 Left Back is an acute Full Thickness pyoderma gangrenosum and has received a status of Not Healed. Subsequent wound encounter measurements are 0.6cm length x 0.4cm width with no measurable depth, with an area of 0.24 sq cm .  There is a scant amoun Wound #3 Sacral is a chronic Full Thickness pyoderma gangrenosum and has received a status of Not Healed. Subsequent wound encounter measurements are 0.2cm length x 0.2cm width x 0.1cm depth, with an area of 0.04 sq cm and a volume of 0.004 cubic cm.  There Wound #5 Left, Lateral Ankle is a chronic Full Thickness pyoderma gangrenosum and has received a status of Not Healed. Subsequent wound encounter measurements are 4.7cm length x 6.1cm width with no measurable depth, with an area of 28.67 sq cm .  There was Left medial leg: larger with 100% slough in the wound bed, painful to touch, with slight erythema to the wound, large amount of clear pale yellow drainage.   Left latera ankle: covered with slough, periwound blister, with 100% slough, painful to touch, larg Medipore dressing with tacrolimus ointment applied  Wound #4 (Left, Medial Lower Leg)  . Wound Treatment Note  Applied topical product to virginia-wound area avoiding wound base.  using Zinc Paste (1)  Applied topical agent to base of wound bed. using Medihoney

## 2017-07-05 NOTE — PROGRESS NOTES
Subjective    Chief Complaint  This information was obtained from the patient  The patient is new to the [address] [facility],[address] here for an initial visit for the evaluation and management of non-healing wound(s). pyoderma gangrenosum on back.  Patient was seen by prim skin color is normal. The temperature of the periwound skin is WNL. Periwound skin does not exhibit signs or symptoms of infection.    General Notes:  6/29/17; wound stalled    Wound #2 Left Axilla is a chronic Full Thickness pyoderma gangrenosum and has re wound pain of level 8/10. The wound margin is not attached to wound base. Wound bed has 1-25% slough, 51-75% bright red, pink, firm granulation; no eschar and no epithelialization present. There is no change noted in the wound progression.    The periwound wound, left lower leg, initial encounter  S21.202A: Unspecified open wound of left back wall of thorax without penetration into thoracic cavity, initial encounter  L89.152: Pressure ulcer of sacral region, stage 2        Patient has HX: Pyoderma Gangrenosu not ordered. Patient to see Dermatologist today and will decide on steroid cream to be ordered. Non-adherent to wound bed.  - covered with vaseline gauze and mepilex foam border dressing 4x4 for today  Other: - applied cream for today will assess patient assess patient next week after being seen by Dermatologist.    Follow-Up Appointments:  A follow-up appointment should be scheduled.           Electronic Signature(s)  Signed By: Date:  Una Whitlock 06/29/2017 11:05:05 AM  Daryle Ducking FNP-BC 06/30/2017 1 Spray (1)  Applied topical product to virginia-wound area avoiding wound base. using Betamethasone valerate 0.1% cream (1)  Applied Primary Wound Dressing. using Vaseline Gauze (1)  Applied Secondary Wound Dressing.  using Mepilex border 4x4 (1)

## 2017-07-05 NOTE — TELEPHONE ENCOUNTER
Dayton Zimmerman - a Nurse Praction at the [address] [place] [address] - has recommarnds she would like to discuss with dr Rd Hemphill -

## 2017-07-06 ENCOUNTER — PATIENT MESSAGE (OUTPATIENT)
Dept: DERMATOLOGY CLINIC | Facility: CLINIC | Age: 67
End: 2017-07-06

## 2017-07-06 NOTE — TELEPHONE ENCOUNTER
Let pt know that if the tacrilomus is burning a lot, can stop applying it to ankle ulcers, but if she is able to tolerate, can be quite helpful. If she prefers, can use clobetasol to those areas instead, but I think that caused burning as well.    Let her k

## 2017-07-06 NOTE — TELEPHONE ENCOUNTER
Pt contacted via telephone, reiterated that ulcers to ankles are painful, open, weeping.   Pt informed per [facility] that she she can stop the application of tacrolimus to the ulcers, but if she is able to tolerate it can be quite helpful in the healing of the ul

## 2017-07-06 NOTE — TELEPHONE ENCOUNTER
From: Adam Curiel  To: Lindy Uriarte MD  Sent: 7/6/2017 1:49 PM CDT  Subject: Other    Re:  Wound Care for leg  I attended my appt for Wound care yesterday (7/5) & let Laurent Boyer (LAZARO) know that I was having a slight burning sensation when I had applied th

## 2017-07-07 ENCOUNTER — TELEPHONE (OUTPATIENT)
Dept: GASTROENTEROLOGY | Facility: CLINIC | Age: 67
End: 2017-07-07

## 2017-07-07 NOTE — TELEPHONE ENCOUNTER
From: Elisabet Harrell  To: Betty Alexander MD  Sent: 7/5/2017 4:38 PM CDT  Subject: Other    Dr Vangie Perera,  my 1st Remicade treatment is now scheduled for Monday, July 10th.  I would like to know if any of my meds &/or supplements need to be halted

## 2017-07-07 NOTE — TELEPHONE ENCOUNTER
Pt has questions re: medications/supplements and remicade infusion scheduled for Monday. See also more detailed 7/5/17 Patient Email. Please call.

## 2017-07-07 NOTE — TELEPHONE ENCOUNTER
Informed pt per Dr. Trivedi Linear message below. Pt verbalized understanding. No further questions or concerns.

## 2017-07-07 NOTE — TELEPHONE ENCOUNTER
Dr. Kirstie Ramírez - Pt sent mychart on 7.5. 17 with questions re: upcoming remicade on Monday 7.10.17.  I have copy/pasted message below:    Elodia Ackerman   to Lexi Burrell MD     7/5/17 4:38 PM   Dr Kirstie Ramírez,   my 1st Remicade treatment is now scheduled

## 2017-07-07 NOTE — TELEPHONE ENCOUNTER
Reviewed below with pt    She states she previously did research in IVIG (see original question below) but decided on proceeding with Remicade.   Reviewed in detail that IVIG is not something we typically order for IBD - pt states she researched that this c

## 2017-07-10 ENCOUNTER — NURSE ONLY (OUTPATIENT)
Dept: WOUND CARE | Facility: HOSPITAL | Age: 67
End: 2017-07-10
Attending: NURSE PRACTITIONER
Payer: COMMERCIAL

## 2017-07-10 ENCOUNTER — OFFICE VISIT (OUTPATIENT)
Dept: HEMATOLOGY/ONCOLOGY | Facility: HOSPITAL | Age: 67
End: 2017-07-10
Attending: INTERNAL MEDICINE
Payer: COMMERCIAL

## 2017-07-10 ENCOUNTER — TELEPHONE (OUTPATIENT)
Dept: GASTROENTEROLOGY | Facility: CLINIC | Age: 67
End: 2017-07-10

## 2017-07-10 VITALS
RESPIRATION RATE: 16 BRPM | TEMPERATURE: 98 F | HEART RATE: 67 BPM | DIASTOLIC BLOOD PRESSURE: 46 MMHG | SYSTOLIC BLOOD PRESSURE: 95 MMHG

## 2017-07-10 DIAGNOSIS — K51.011 ULCERATIVE CHRONIC PANCOLITIS WITH RECTAL BLEEDING (HCC): Primary | ICD-10-CM

## 2017-07-10 DIAGNOSIS — S81.802D LEG WOUND, LEFT, SUBSEQUENT ENCOUNTER: ICD-10-CM

## 2017-07-10 DIAGNOSIS — S21.202D OPEN WOUND OF LEFT BACK WALL OF THORAX WITHOUT PENETRATION INTO THORACIC CAVITY, SUBSEQUENT ENCOUNTER: Primary | ICD-10-CM

## 2017-07-10 PROCEDURE — 97597 DBRDMT OPN WND 1ST 20 CM/<: CPT

## 2017-07-10 PROCEDURE — 96415 CHEMO IV INFUSION ADDL HR: CPT

## 2017-07-10 PROCEDURE — 96413 CHEMO IV INFUSION 1 HR: CPT

## 2017-07-10 PROCEDURE — 97597 DBRDMT OPN WND 1ST 20 CM/<: CPT | Performed by: NURSE PRACTITIONER

## 2017-07-10 RX ORDER — ACETAMINOPHEN 325 MG/1
650 TABLET ORAL ONCE
Status: CANCELLED | OUTPATIENT
Start: 2017-08-21

## 2017-07-10 RX ORDER — SODIUM CHLORIDE 9 MG/ML
INJECTION, SOLUTION INTRAVENOUS
Status: DISCONTINUED
Start: 2017-07-10 | End: 2017-07-10

## 2017-07-10 RX ORDER — ACETAMINOPHEN 325 MG/1
TABLET ORAL
Status: COMPLETED
Start: 2017-07-10 | End: 2017-07-10

## 2017-07-10 RX ORDER — ACETAMINOPHEN 325 MG/1
650 TABLET ORAL ONCE
Status: COMPLETED | OUTPATIENT
Start: 2017-07-10 | End: 2017-07-10

## 2017-07-10 RX ORDER — 0.9 % SODIUM CHLORIDE 0.9 %
VIAL (ML) INJECTION
Status: DISCONTINUED
Start: 2017-07-10 | End: 2017-07-10

## 2017-07-10 RX ADMIN — ACETAMINOPHEN 650 MG: 325 TABLET ORAL at 09:47:00

## 2017-07-10 NOTE — PROGRESS NOTES
Header Image    Progress Note Details  Patient Name: Vamsi Lawrence  Patient Number: [id number]  Patient YOB: 1950  Date: 7/10/2017  RN: Suzette Mo CNA/KAREN/MAYNOR:  Ros Stevenson  Physician / Kavon Kay: Colletta Leach: Mame Gross The periwound skin texture is normal. The periwound skin moisture is normal. The periwound skin color is normal. The temperature of the periwound skin is WNL. Periwound skin does not exhibit signs or symptoms of infection.       Wound #2 Left Axilla is a ch Wound #4 Left, Medial Lower Leg is a chronic Full Thickness pyoderma gangrenosum and has received a status of Not Healed.  Subsequent wound encounter measurements are 6.5cm length x 5cm width x 0.1cm depth, with an area of 32.5 sq cm and a volume of 3.25 cu (Encounter Diagnosis) S21.202A - Unspecified open wound of left back wall of thorax without penetration into thoracic cavity, initial encounter  (Encounter Diagnosis) L89.152 - Pressure ulcer of sacral region, stage 2    Diagnoses    ICD-10  Z87.2: Jose Eduardo Artiflex 10 cm  Artiflex 15 cm          Electronic Signature(s)  Signed By: Date:  Diana Ashley 07/10/2017 9:41:56 AM       Entered By: Diana Ashley on 07/10/2017 9:41:36 AM        Treatment Notes Summary  Wound #1 (Left Back)  . Wound Treatment Note  C Applied Primary Wound Dressing. using Gauze (sterile) 4x4 (1), Vaseline Gauze (1)  Applied Secondary Wound Dressing.  using Other - see notes (1)  Compression wrapping  Stockinette applied using 3\" (1)  Compression applied to: using Toe bases to tibial tub

## 2017-07-10 NOTE — TELEPHONE ENCOUNTER
Dr Kiera Mar,    I called pt for her overdue labs that are still pending. Pt stated she started her remicade treatment today and wants to know if that would have any effect on lab results.   Pending labs are Quantiferon, Hep AB+ C , profile,TIBC,Iron Ferritin, V

## 2017-07-10 NOTE — PROGRESS NOTES
Patient arrives for first dose of Remicade for ulcerative colitis. Patient educated on what to expect while receiving remicade, all questions answered. [employer] handout regarding remicade given to patient.  Patient states her UC is under control but it ha

## 2017-07-11 ENCOUNTER — OFFICE VISIT (OUTPATIENT)
Dept: DERMATOLOGY CLINIC | Facility: CLINIC | Age: 67
End: 2017-07-11

## 2017-07-11 DIAGNOSIS — L88 PYODERMA GANGRENOSA: Primary | ICD-10-CM

## 2017-07-11 PROCEDURE — 99213 OFFICE O/P EST LOW 20 MIN: CPT | Performed by: DERMATOLOGY

## 2017-07-11 PROCEDURE — 99212 OFFICE O/P EST SF 10 MIN: CPT | Performed by: DERMATOLOGY

## 2017-07-11 RX ORDER — PREDNISONE 10 MG/1
TABLET ORAL
Qty: 60 TABLET | Refills: 1 | Status: SHIPPED | OUTPATIENT
Start: 2017-07-11 | End: 2017-10-05 | Stop reason: DRUGHIGH

## 2017-07-11 NOTE — PROGRESS NOTES
HPI:     Chief Complaint     Derm Problem        HPI     Derm Problem    Additional comments: Patient presents for f/u of wounds to left lower leg and upper back. Using tacrolimus to back. Prednisone 20mg daily. Using medihoney to left lower leg.  Kyree puga affected areas 1-2x/day Disp: 60 g Rfl: 3   predniSONE 5 MG Oral Tab take as directed Disp: 50 tablet Rfl: 1   Clindamycin Phos-Benzoyl Perox (DUAC) 1.2-5 % External Gel Apply 1 Application topically daily.  Disp: 1 Tube Rfl: 3   Sulfacetamide Sodium, Acne, Pacemaker Father    • Heart Disease Father    • Melanoma Father    • Diabetes Mother      DM   • Cancer Mother      leukemia   • Cancer Maternal Grandmother      lung   • Cancer Paternal Grandmother      stomach   • Cancer Sister      breast   • Breast Can placed in this encounter.         7/11/2017  Heidi Potts

## 2017-07-13 ENCOUNTER — NURSE ONLY (OUTPATIENT)
Dept: WOUND CARE | Facility: HOSPITAL | Age: 67
End: 2017-07-13
Attending: NURSE PRACTITIONER
Payer: COMMERCIAL

## 2017-07-13 ENCOUNTER — LAB ENCOUNTER (OUTPATIENT)
Dept: LAB | Facility: HOSPITAL | Age: 67
End: 2017-07-13
Attending: INTERNAL MEDICINE
Payer: COMMERCIAL

## 2017-07-13 DIAGNOSIS — S81.802D LEG WOUND, LEFT, SUBSEQUENT ENCOUNTER: ICD-10-CM

## 2017-07-13 DIAGNOSIS — S21.202D OPEN WOUND OF LEFT BACK WALL OF THORAX WITHOUT PENETRATION INTO THORACIC CAVITY, SUBSEQUENT ENCOUNTER: Primary | ICD-10-CM

## 2017-07-13 DIAGNOSIS — K52.9 IBD (INFLAMMATORY BOWEL DISEASE): ICD-10-CM

## 2017-07-13 LAB
ALBUMIN SERPL BCP-MCNC: 3.1 G/DL (ref 3.5–4.8)
ALP SERPL-CCNC: 52 U/L (ref 32–100)
ALT SERPL-CCNC: 17 U/L (ref 14–54)
AST SERPL-CCNC: 21 U/L (ref 15–41)
BASOPHILS # BLD: 0.1 K/UL (ref 0–0.2)
BASOPHILS NFR BLD: 1 %
BILIRUB DIRECT SERPL-MCNC: 0 MG/DL (ref 0–0.2)
BILIRUB SERPL-MCNC: 0.3 MG/DL (ref 0.3–1.2)
EOSINOPHIL # BLD: 0.2 K/UL (ref 0–0.7)
EOSINOPHIL NFR BLD: 3 %
ERYTHROCYTE [DISTWIDTH] IN BLOOD BY AUTOMATED COUNT: 16.8 % (ref 11–15)
FERRITIN SERPL IA-MCNC: 11 NG/ML (ref 11–307)
HAV AB SER QL IA: NONREACTIVE
HBV CORE AB SERPL QL IA: NONREACTIVE
HBV SURFACE AB SER-ACNC: <3.1 MIU/ML (ref ?–10)
HBV SURFACE AG SERPL QL IA: NONREACTIVE
HBV SURFACE AG SERPL QL IA: NONREACTIVE
HCT VFR BLD AUTO: 32.4 % (ref 35–48)
HCV AB SERPL QL IA: NONREACTIVE
HGB BLD-MCNC: 10.6 G/DL (ref 12–16)
IRON SATN MFR SERPL: 12 % (ref 15–50)
IRON SERPL-MCNC: 38 MCG/DL (ref 28–170)
LYMPHOCYTES # BLD: 2.9 K/UL (ref 1–4)
LYMPHOCYTES NFR BLD: 36 %
MCH RBC QN AUTO: 30.1 PG (ref 27–32)
MCHC RBC AUTO-ENTMCNC: 32.8 G/DL (ref 32–37)
MCV RBC AUTO: 91.9 FL (ref 80–100)
MONOCYTES # BLD: 0.7 K/UL (ref 0–1)
MONOCYTES NFR BLD: 9 %
NEUTROPHILS # BLD AUTO: 4.2 K/UL (ref 1.8–7.7)
NEUTROPHILS NFR BLD: 52 %
PLATELET # BLD AUTO: 504 K/UL (ref 140–400)
PMV BLD AUTO: 7.2 FL (ref 7.4–10.3)
PROT SERPL-MCNC: 6.7 G/DL (ref 5.9–8.4)
RBC # BLD AUTO: 3.52 M/UL (ref 3.7–5.4)
TIBC SERPL-MCNC: 322 MCG/DL (ref 228–428)
TRANSFERRIN SERPL-MCNC: 244 MG/DL (ref 192–382)
VIT B12 SERPL-MCNC: 492 PG/ML (ref 181–914)
WBC # BLD AUTO: 8 K/UL (ref 4–11)

## 2017-07-13 PROCEDURE — 82607 VITAMIN B-12: CPT

## 2017-07-13 PROCEDURE — 29581 APPL MULTLAYER CMPRN SYS LEG: CPT

## 2017-07-13 PROCEDURE — 36415 COLL VENOUS BLD VENIPUNCTURE: CPT

## 2017-07-13 PROCEDURE — 86708 HEPATITIS A ANTIBODY: CPT

## 2017-07-13 PROCEDURE — 82306 VITAMIN D 25 HYDROXY: CPT

## 2017-07-13 PROCEDURE — 82728 ASSAY OF FERRITIN: CPT

## 2017-07-13 PROCEDURE — 86706 HEP B SURFACE ANTIBODY: CPT

## 2017-07-13 PROCEDURE — 80500 HEPATITIS A B + C PROFILE: CPT

## 2017-07-13 PROCEDURE — 84466 ASSAY OF TRANSFERRIN: CPT

## 2017-07-13 PROCEDURE — 86704 HEP B CORE ANTIBODY TOTAL: CPT

## 2017-07-13 PROCEDURE — 83540 ASSAY OF IRON: CPT

## 2017-07-13 PROCEDURE — 86803 HEPATITIS C AB TEST: CPT

## 2017-07-13 PROCEDURE — 85025 COMPLETE CBC W/AUTO DIFF WBC: CPT

## 2017-07-13 PROCEDURE — 87340 HEPATITIS B SURFACE AG IA: CPT

## 2017-07-13 PROCEDURE — 86480 TB TEST CELL IMMUN MEASURE: CPT

## 2017-07-13 PROCEDURE — 80076 HEPATIC FUNCTION PANEL: CPT

## 2017-07-13 NOTE — PROGRESS NOTES
Subjective    Chief Complaint  This information was obtained from the patient  The patient was seen today for follow up and management of difficult to heal wound(s). patient has no concerns for todays visit.     Allergies  adhesive tape (Severity: Mild, Marrion Antes concerns. Procedures    Wound #4  Wound #4 (Other) is located on the left, medial lower leg. A Multi Layer Compression Wrap procedure was performed by Kelly Jonas RN. Bárbara Juan M was applied with . The procedure was tolerated well. Wound Dressing. using Other - see notes (1)   Notes  Medipore dressing with tacrolimus ointment applied   Wound #4 (Left, Medial Lower Leg)  . Wound Treatment Note  Performed antiseptic soak to wound: using Dakins 1/4 strength (1)   Antiseptic soak time: us

## 2017-07-14 LAB
25(OH)D3 SERPL-MCNC: 82.6 NG/ML
M TB IFN-G CD4+ BCKGRND COR BLD-ACNC: -0.02 IU/ML
M TB IFN-G CD4+ T-CELLS BLD-ACNC: 0.05 IU/ML
M TB TUBERC IGNF/MITOGEN IGNF CONTROL: 0.11 IU/ML

## 2017-07-16 ENCOUNTER — PATIENT MESSAGE (OUTPATIENT)
Dept: DERMATOLOGY CLINIC | Facility: CLINIC | Age: 67
End: 2017-07-16

## 2017-07-17 ENCOUNTER — TELEPHONE (OUTPATIENT)
Dept: DERMATOLOGY CLINIC | Facility: CLINIC | Age: 67
End: 2017-07-17

## 2017-07-17 ENCOUNTER — APPOINTMENT (OUTPATIENT)
Dept: WOUND CARE | Facility: HOSPITAL | Age: 67
End: 2017-07-17
Attending: NURSE PRACTITIONER
Payer: COMMERCIAL

## 2017-07-17 DIAGNOSIS — S81.802D LEG WOUND, LEFT, SUBSEQUENT ENCOUNTER: Primary | ICD-10-CM

## 2017-07-17 DIAGNOSIS — S21.202D OPEN WOUND OF LEFT BACK WALL OF THORAX WITHOUT PENETRATION INTO THORACIC CAVITY, SUBSEQUENT ENCOUNTER: ICD-10-CM

## 2017-07-17 PROCEDURE — 97602 WOUND(S) CARE NON-SELECTIVE: CPT

## 2017-07-17 PROCEDURE — 99211 OFF/OP EST MAY X REQ PHY/QHP: CPT

## 2017-07-17 RX ORDER — HYDROCODONE BITARTRATE AND ACETAMINOPHEN 5; 325 MG/1; MG/1
TABLET ORAL
Qty: 14 TABLET | Refills: 0 | Status: SHIPPED | OUTPATIENT
Start: 2017-07-17 | End: 2017-09-21 | Stop reason: ALTCHOICE

## 2017-07-17 NOTE — TELEPHONE ENCOUNTER
Please review Rx for Norco and sign. Rx pended.   See sig, take 1-2 tablets daily as needed for pain (please clarify on sig- just daily or may pt have every 4-6 hours as needed)

## 2017-07-17 NOTE — TELEPHONE ENCOUNTER
From: Eda Sacks  To: Ganesh Hawkins MD  Sent: 7/16/2017 11:26 PM CDT  Subject: Prescription Question    I'm requesting a refill for a drug not listed on my Rx refill list.  On 6/11/17 at my ER visit, Dr Lindsay Boswell prescribed Norco drug as a painkille

## 2017-07-17 NOTE — TELEPHONE ENCOUNTER
Pt contacted, informed that she must  Rx for Norco, available for her at the . Pt notes she will  the prescription at the  of dermatology.

## 2017-07-17 NOTE — TELEPHONE ENCOUNTER
Pt contacted, informed that Rx for Howells from [facility] is available for her at the  of derm. Pt asked if Rx could be called into pharmacy. Pt informed that it is the law that Norco Rxs cannot be faxed, e-scribed or called into pharmacy.   The pt must

## 2017-07-17 NOTE — PROGRESS NOTES
Chief Complaint  This information was obtained from the patient  The patient was seen today for follow up and management of difficult to heal wound(s). patient states after she was debrided last visit she felt a lot of burning.     Allergies  adhesive tape Wound #2 Left Axilla is a chronic Full Thickness pyoderma gangrenosum and has received a status of Not Healed. Subsequent wound encounter measurements are 0.4cm length x 0.2cm width x 0.1cm depth, with an area of 0.08 sq cm and a volume of 0.008 cubic cm. Wound #5 Left, Lateral Ankle is a chronic Full Thickness pyoderma gangrenosum and has received a status of Not Healed.  Subsequent wound encounter measurements are 4.5cm length x 6cm width x 0.2cm depth, with an area of 27 sq cm and a volume of 5.4 cubic cm Patient arrrived today in some pain. Pippa WILLIS into assess the wound. Patient took her own pain medication to help with the pain and debridement. Patient was medicated with topical lidocaine in addition.  Patient was not able to tolerate the entire w Wound #5 (Other) is located on the left, lateral ankle. A selective debridement with a total area debrided of 1.35 sq cm was performed by Whit Au RN. to remove devitalized tissue: biofilm, exudate, and slough.  The following instrument(s) were used Applied topical product to virginia-wound area avoiding wound base. using Vaseline (1), Zinc Paste (1)   Applied topical agent to base of wound bed. using Medihoney gel (1)   Applied Primary Wound Dressing.  using Gauze (sterile) 4x4 (1), Xeroform 5x9 (1)   Nichole

## 2017-07-19 ENCOUNTER — TELEPHONE (OUTPATIENT)
Dept: DERMATOLOGY CLINIC | Facility: CLINIC | Age: 67
End: 2017-07-19

## 2017-07-19 ENCOUNTER — OFFICE VISIT (OUTPATIENT)
Dept: OBGYN CLINIC | Facility: CLINIC | Age: 67
End: 2017-07-19

## 2017-07-19 VITALS
SYSTOLIC BLOOD PRESSURE: 110 MMHG | WEIGHT: 130 LBS | HEART RATE: 68 BPM | BODY MASS INDEX: 20 KG/M2 | DIASTOLIC BLOOD PRESSURE: 65 MMHG

## 2017-07-19 DIAGNOSIS — N81.3 COMPLETE UTERINE PROLAPSE: Primary | ICD-10-CM

## 2017-07-19 PROCEDURE — 99213 OFFICE O/P EST LOW 20 MIN: CPT | Performed by: OBSTETRICS & GYNECOLOGY

## 2017-07-19 RX ORDER — LIDOCAINE AND PRILOCAINE 25; 25 MG/G; MG/G
CREAM TOPICAL
Qty: 10 G | Refills: 3 | Status: SHIPPED | OUTPATIENT
Start: 2017-07-19 | End: 2017-09-21 | Stop reason: ALTCHOICE

## 2017-07-19 NOTE — TELEPHONE ENCOUNTER
LOV 7/11/17, pt came to the window, wanted to ask if you would be able to prescribe lidocaine patch or any topical (cream/gel) although she prefers patch for her pyoderma to legs. States the dressing changes are causing pain - kindly advise on this please.

## 2017-07-19 NOTE — TELEPHONE ENCOUNTER
Pt is wondering if she would possible be able to get a liadicaine patch for her leg pain.  pls advise if possible

## 2017-07-19 NOTE — TELEPHONE ENCOUNTER
Lidocaine patches should really just be used on intact skin and are mostly helpful for nerve pain from shingles  May try EMLA oint (topical lidocaine/prilocaine)- apply 1-3 hours prior to wound dressings- should not use more than 2.5 gm at a time due to ab

## 2017-07-19 NOTE — PROGRESS NOTES
Telma Alas is a 73year old female  No LMP recorded. Patient is postmenopausal. Patient presents with:  Gyn Problem: PESSARY CLEANNING  .      OBSTETRICS HISTORY:  Obstetric History     T2    L2    SAB0  TAB0  Ectopic0  Multiple0  L % External Solution, Pour 5ml of Dakin's sloution on gauze, apply gauze over the wound for 30 min, remove the gauze. Rinse with Normal salin or wound spray. Daily. , Disp: 1 Bottle, Rfl: 2  •  PROTEIN OR, Take by mouth daily. , Disp: , Rfl:   •  B Complex Vi bleeding.       PHYSICAL EXAM:   Constitutional: well developed, well nourished  Head/Face: normocephalic  Pessary Cleaning        Problems:  None     Procedure:  Pessary Type: Ring with support  Size: 6     Rectocele Grade 2  Cystocele Grade 2  Enterocele

## 2017-07-19 NOTE — PROGRESS NOTES
Pessary Cleaning      Problems:  None    Procedure:  Pessary Type: Ring with support  Size: 6    Rectocele Grade 2  Cystocele Grade 2  Enterocele Grade n/a  Uterine Prolapse Grade 4    Pessary removed, cleansed and replaced with out difficulty after vagina

## 2017-07-20 ENCOUNTER — OFFICE VISIT (OUTPATIENT)
Dept: WOUND CARE | Facility: HOSPITAL | Age: 67
End: 2017-07-20
Attending: NURSE PRACTITIONER
Payer: COMMERCIAL

## 2017-07-20 DIAGNOSIS — S21.202D OPEN WOUND OF LEFT BACK WALL OF THORAX WITHOUT PENETRATION INTO THORACIC CAVITY, SUBSEQUENT ENCOUNTER: ICD-10-CM

## 2017-07-20 DIAGNOSIS — S81.802D LEG WOUND, LEFT, SUBSEQUENT ENCOUNTER: ICD-10-CM

## 2017-07-20 PROCEDURE — 29581 APPL MULTLAYER CMPRN SYS LEG: CPT | Performed by: NURSE PRACTITIONER

## 2017-07-20 NOTE — PROGRESS NOTES
Subjective    Chief Complaint  This information was obtained from the patient  The patient was seen today for follow up and management of difficult to heal wound(s).    7/20/17 patient recieved a script from her MD for lidocaine cream but it didn't seem to wound pain of level 4/10. The wound margin is flat and intact. Wound bed has 1-25% epithelialization, % pink, firm granulation; no slough and no eschar present. The wound is improving.    The periwound skin texture is normal. The periwound skin moistu Unspecified open wound, left lower leg, initial encounter  (Encounter Diagnosis) S21.202A - Unspecified open wound of left back wall of thorax without penetration into thoracic cavity, initial encounter  (Encounter Diagnosis) L89.152 - Pressure ulcer of sa dressing with tacrolimus ointment applied  Wound #2 (Left Axilla)  . Wound Treatment Note  Cleansed wound and periwound with non-cytotoxic agent.  using Wound Cleanser Spray (1)  Applied topical agent to base of wound bed. using Other - see notes (1)  Applie Anain 10 cm (1)  Selective / Non-selective debridement  Performed selective sharp debridement to wound  Tissue and other material debrided: using Slough (1)  Debridement instrument used: using Scalpel (1)  Assured hemostasis using N/A (1)  Notes  not s

## 2017-07-24 ENCOUNTER — PATIENT MESSAGE (OUTPATIENT)
Dept: GASTROENTEROLOGY | Facility: CLINIC | Age: 67
End: 2017-07-24

## 2017-07-24 ENCOUNTER — OFFICE VISIT (OUTPATIENT)
Dept: WOUND CARE | Facility: HOSPITAL | Age: 67
End: 2017-07-24
Attending: NURSE PRACTITIONER
Payer: COMMERCIAL

## 2017-07-24 ENCOUNTER — OFFICE VISIT (OUTPATIENT)
Dept: HEMATOLOGY/ONCOLOGY | Facility: HOSPITAL | Age: 67
End: 2017-07-24
Attending: INTERNAL MEDICINE
Payer: COMMERCIAL

## 2017-07-24 VITALS
HEART RATE: 72 BPM | RESPIRATION RATE: 16 BRPM | DIASTOLIC BLOOD PRESSURE: 46 MMHG | BODY MASS INDEX: 21 KG/M2 | SYSTOLIC BLOOD PRESSURE: 86 MMHG | WEIGHT: 135.38 LBS | TEMPERATURE: 98 F

## 2017-07-24 DIAGNOSIS — S21.202D OPEN WOUND OF LEFT BACK WALL OF THORAX WITHOUT PENETRATION INTO THORACIC CAVITY, SUBSEQUENT ENCOUNTER: ICD-10-CM

## 2017-07-24 DIAGNOSIS — S81.802D LEG WOUND, LEFT, SUBSEQUENT ENCOUNTER: Primary | ICD-10-CM

## 2017-07-24 DIAGNOSIS — K51.011 ULCERATIVE CHRONIC PANCOLITIS WITH RECTAL BLEEDING (HCC): Primary | ICD-10-CM

## 2017-07-24 DIAGNOSIS — L88 PYODERMA GANGRENOSA: ICD-10-CM

## 2017-07-24 PROCEDURE — 96415 CHEMO IV INFUSION ADDL HR: CPT

## 2017-07-24 PROCEDURE — 97597 DBRDMT OPN WND 1ST 20 CM/<: CPT | Performed by: NURSE PRACTITIONER

## 2017-07-24 PROCEDURE — 96413 CHEMO IV INFUSION 1 HR: CPT

## 2017-07-24 RX ORDER — ACETAMINOPHEN 325 MG/1
650 TABLET ORAL ONCE
Status: CANCELLED | OUTPATIENT
Start: 2017-08-21

## 2017-07-24 RX ORDER — ACETAMINOPHEN 325 MG/1
TABLET ORAL
Status: COMPLETED
Start: 2017-07-24 | End: 2017-07-24

## 2017-07-24 RX ORDER — SODIUM CHLORIDE 9 MG/ML
INJECTION, SOLUTION INTRAVENOUS
Status: DISCONTINUED
Start: 2017-07-24 | End: 2017-07-24

## 2017-07-24 RX ORDER — ACETAMINOPHEN 325 MG/1
650 TABLET ORAL ONCE
Status: COMPLETED | OUTPATIENT
Start: 2017-07-24 | End: 2017-07-24

## 2017-07-24 RX ORDER — 0.9 % SODIUM CHLORIDE 0.9 %
VIAL (ML) INJECTION
Status: DISCONTINUED
Start: 2017-07-24 | End: 2017-07-24

## 2017-07-24 RX ADMIN — ACETAMINOPHEN 650 MG: 325 TABLET ORAL at 09:45:00

## 2017-07-24 NOTE — PROGRESS NOTES
Subjective    Chief Complaint  This information was obtained from the patient  The patient was seen today for follow up and management of difficult to heal wound(s).   7/24/17 patient took off one wrap on her left leg this am due to pressure and burning. Wound #2 Left Axilla is a chronic Full Thickness pyoderma gangrenosum and has received an outcome of Resolved. Subsequent wound encounter measurements are 0cm length x 0cm width x 0cm depth, with an area of 0 sq cm and a volume of 0 cubic cm.  There was no Wound #5 Left, Lateral Ankle is a chronic Full Thickness pyoderma gangrenosum and has received a status of Not Healed.  Subsequent wound encounter measurements are 4cm length x 4.5cm width x 0.2cm depth, with an area of 18 sq cm and a volume of 3.6 cubic cm The anterior left leg wound is improving, less drainage, more epithelization, more granulation and less adherent slough. There is no S&S of infection.  Patient cleaning her wounds with Dakin's for a 10 min few days of a week not every day and applying dress Continue daily dressing change as directed. Reduce the [facility] visit to once a week, the wound has been stabilized and progressing well. Patient educational need has met at this time. We will modify the treatment plan as the wound becomes more granular. Limb cleansed using Soap and water (1)  Cleansed wound and periwound with non-cytotoxic agent. using Wound Cleanser Spray (1)  Applied topical agent to base of wound bed. using Medihoney gel (1)  Applied Primary Wound Dressing.  using Vaseline Gauze (1)  Ap Header Image    Debridement Details  Patient Name: Jean Pierre Crowley  Patient Number: [id number]  Patient YOB: 1950  Date: 7/24/2017  RN: Piero Patel CNA/KAREN/CMA:  Lula Tejeda  Physician / Letty Saab: Ebony Pantoja, [address] [facility]: Outpatient

## 2017-07-24 NOTE — PROGRESS NOTES
Pt presents to infusion today for week 2 Remicade. Pt appears well and states that after her first infusion, her abdominal pain was already better. Pt denies any constipation/diarrhea or abdominal pain. No blood in stool.   Pt is currently dealing with a

## 2017-07-25 ENCOUNTER — OFFICE VISIT (OUTPATIENT)
Dept: DERMATOLOGY CLINIC | Facility: CLINIC | Age: 67
End: 2017-07-25

## 2017-07-25 DIAGNOSIS — L88 PYODERMA GANGRENOSA: Primary | ICD-10-CM

## 2017-07-25 DIAGNOSIS — L91.0 KELOID SCAR: ICD-10-CM

## 2017-07-25 DIAGNOSIS — L65.9 HAIR LOSS: ICD-10-CM

## 2017-07-25 PROCEDURE — 99213 OFFICE O/P EST LOW 20 MIN: CPT | Performed by: DERMATOLOGY

## 2017-07-25 PROCEDURE — 99212 OFFICE O/P EST SF 10 MIN: CPT | Performed by: DERMATOLOGY

## 2017-07-25 NOTE — PATIENT INSTRUCTIONS
For scalp- 5% Minoxidil (Rogaine) - apply to scalp daily  For Keloid- silicone scar patches- apply and change every 3-5 days

## 2017-07-25 NOTE — PROGRESS NOTES
HPI:     Chief Complaint     Derm Problem        HPI     Derm Problem    Additional comments: Patient presents for f/u of wounds to upper back and left lower back. Continues to use Santyl to lower leg and tacolimus to back. Prednisone 20mg daily.  Patient s Oral Tab Take 2 tablets each am with food Disp: 60 tablet Rfl: 1   Dakins 0.25 % External Solution Pour 5ml of Dakin's sloution on gauze, apply gauze over the wound for 30 min, remove the gauze. Rinse with Normal salin or wound spray. Daily.  Disp: 1 Bottle Former Smoker     Types: Cigarettes    Quit date: 7/21/2004    Smokeless tobacco: Former User    Alcohol use Yes  0.0 oz/week     Comment: very minimally- one drink per weekend; socially    Drug use: No    Sexual activity: Not on file     Other Topics Conc tapering the prednisone to 15 mg daily. Hair loss-I suspect mostly a telogen effluvium from patient's recent medical issues.   May also have some or all of the prednisone but I suspect the rapidity of which it happened would be consistent with telogen effl

## 2017-07-25 NOTE — TELEPHONE ENCOUNTER
From: Niall Lundberg  To: Luther Finney MD  Sent: 7/24/2017 11:56 PM CDT  Subject: Visit Radha Pearson had my 2nd remicade treatment today & wondering if I need to set a follow-up appt with you anytime soon.   I

## 2017-07-27 ENCOUNTER — APPOINTMENT (OUTPATIENT)
Dept: WOUND CARE | Facility: HOSPITAL | Age: 67
End: 2017-07-27
Payer: COMMERCIAL

## 2017-07-31 ENCOUNTER — NURSE ONLY (OUTPATIENT)
Dept: WOUND CARE | Facility: HOSPITAL | Age: 67
End: 2017-07-31
Attending: NURSE PRACTITIONER
Payer: COMMERCIAL

## 2017-07-31 DIAGNOSIS — S21.202D OPEN WOUND OF LEFT BACK WALL OF THORAX WITHOUT PENETRATION INTO THORACIC CAVITY, SUBSEQUENT ENCOUNTER: ICD-10-CM

## 2017-07-31 DIAGNOSIS — S81.802D LEG WOUND, LEFT, SUBSEQUENT ENCOUNTER: Primary | ICD-10-CM

## 2017-07-31 PROCEDURE — 97597 DBRDMT OPN WND 1ST 20 CM/<: CPT | Performed by: NURSE PRACTITIONER

## 2017-07-31 PROCEDURE — 97597 DBRDMT OPN WND 1ST 20 CM/<: CPT

## 2017-07-31 NOTE — PROGRESS NOTES
Chief Complaint  This information was obtained from the patient  The patient was seen today for follow up and management of difficult to heal wound(s).   7/24/17 patient took off one wrap on her left leg this am due to pressure and burning.  7/31/17 no com Wound #4 Left, Medial Lower Leg is a chronic Full Thickness pyoderma gangrenosum and has received a status of Not Healed.  Subsequent wound encounter measurements are 5.5cm length x 4cm width x 0.1cm depth, with an area of 22 sq cm and a volume of 2.2 cubic (Encounter Diagnosis) S21.202A - Unspecified open wound of left back wall of thorax without penetration into thoracic cavity, initial encounter  (Encounter Diagnosis) L89.152 - Pressure ulcer of sacral region, stage 2    Diagnoses    ICD-10  Z87.2: Jose Eduardo Wound #4 (Other) is located on the left, medial lower leg. A Multi Layer Compression Wrap procedure was performed for the lower left extremity by Faith Marley NP. Chary Belch was applied with .  The procedure was tolerated well with pain level Treatment Notes Summary    Wound #1 (Left Back)  . Wound Treatment Note  Cleansed wound and periwound with non-cytotoxic agent.  using Wound Cleanser Spray (1)   Applied topical agent to base of wound bed. using Other - see notes (1)   Applied Secondary Woun

## 2017-08-03 ENCOUNTER — APPOINTMENT (OUTPATIENT)
Dept: WOUND CARE | Facility: HOSPITAL | Age: 67
End: 2017-08-03
Attending: NURSE PRACTITIONER
Payer: COMMERCIAL

## 2017-08-07 ENCOUNTER — NURSE ONLY (OUTPATIENT)
Dept: WOUND CARE | Facility: HOSPITAL | Age: 67
End: 2017-08-07
Attending: NURSE PRACTITIONER
Payer: COMMERCIAL

## 2017-08-07 DIAGNOSIS — S81.802D LEG WOUND, LEFT, SUBSEQUENT ENCOUNTER: ICD-10-CM

## 2017-08-07 DIAGNOSIS — S21.202D OPEN WOUND OF LEFT BACK WALL OF THORAX WITHOUT PENETRATION INTO THORACIC CAVITY, SUBSEQUENT ENCOUNTER: Primary | ICD-10-CM

## 2017-08-07 PROCEDURE — 99211 OFF/OP EST MAY X REQ PHY/QHP: CPT | Performed by: NURSE PRACTITIONER

## 2017-08-07 PROCEDURE — 29581 APPL MULTLAYER CMPRN SYS LEG: CPT

## 2017-08-07 PROCEDURE — 99211 OFF/OP EST MAY X REQ PHY/QHP: CPT

## 2017-08-07 NOTE — PROGRESS NOTES
Chief Complaint  This information was obtained from the patient  The patient was seen today for follow up and management of difficult to heal wound(s).   7/24/17 patient took off one wrap on her left leg this am due to pressure and burning.   8/7/17 no comp Wound #4 Left, Medial Lower Leg is a chronic Full Thickness pyoderma gangrenosum and has received a status of Not Healed.  Subsequent wound encounter measurements are 4.5cm length x 3.5cm width x 0.1cm depth, with an area of 15.75 sq cm and a volume of 1.57 (Encounter Diagnosis) S21.202A - Unspecified open wound of left back wall of thorax without penetration into thoracic cavity, initial encounter  (Encounter Diagnosis) L89.152 - Pressure ulcer of sacral region, stage 2    Diagnoses    ICD-10  Z87.2: Jose Eduardo Left ankle wound has improved more epithelization in the small area of granuloma tissue. Left anterior leg wound is improving with some area of adherent slough patient requested not to be depleted at this today.  We will continue with the Camacho guerra for i Applied topical product to virginia-wound area avoiding wound base. using Vaseline (1), Zinc Paste (1)   Applied topical agent to base of wound bed. using Medihoney gel (1)   Applied Primary Wound Dressing.  using Vaseline Gauze (1)   Applied Secondary Wound Dr

## 2017-08-14 ENCOUNTER — APPOINTMENT (OUTPATIENT)
Dept: WOUND CARE | Facility: HOSPITAL | Age: 67
End: 2017-08-14
Attending: NURSE PRACTITIONER
Payer: COMMERCIAL

## 2017-08-14 DIAGNOSIS — L88 PYODERMA GANGRENOSA: ICD-10-CM

## 2017-08-14 DIAGNOSIS — S81.802D LEG WOUND, LEFT, SUBSEQUENT ENCOUNTER: Primary | ICD-10-CM

## 2017-08-14 PROCEDURE — 97597 DBRDMT OPN WND 1ST 20 CM/<: CPT | Performed by: NURSE PRACTITIONER

## 2017-08-14 PROCEDURE — 97597 DBRDMT OPN WND 1ST 20 CM/<: CPT

## 2017-08-14 NOTE — PROGRESS NOTES
Chief Complaint  This information was obtained from the patient  The patient was seen today for follow up and management of difficult to heal wound(s).   7/24/17 patient took off one wrap on her left leg this am due to pressure and burning.   8/14/17 no com Wound #5 Left, Lateral Ankle is a chronic Full Thickness pyoderma gangrenosum and has received a status of Not Healed. Subsequent wound encounter measurements are 1.1cm length x 1.6cm width with no measurable depth, with an area of 1.76 sq cm .  There is a Left Ankle Wound: Wound debrided by LAZARO Kelley. Wound decreasing in size with increasing grannulation and epithelial tissue. Dressed with medihoney and vaseline gauze. Patient to change at home and to follow up weekly.       Left lower extremity with Wound #5 (Other) is located on the left, lateral ankle. A selective debridement with a total area debrided of 0.88 sq cm was performed by FRANNY Peña. Dermis was removed along with devitalized tissue: biofilm and slough.  The following instrument(s) Applied Compression device.  using Comprilan (1)  Compression wrapping  Stockinette applied using 3\" (1)  Compression applied to: using Toe bases to tibial tubercle (1)  Compression used: using Artiflex 10 cm (1), Comprilan 08 cm (1), Comprilan 10 cm (1) Hemostasis Achieved: Silver Nitrate  Procedural Pain: 6  Post Procedural Pain: 1  Response to Treatment: Procedure was tolerated well    Electronic Signature(s)  Signed By: Date:  Dianne Meza FNP-BC 08/14/2017 10:05:10 AM       Entered By: Torres Alvarado

## 2017-08-21 ENCOUNTER — OFFICE VISIT (OUTPATIENT)
Dept: HEMATOLOGY/ONCOLOGY | Facility: HOSPITAL | Age: 67
End: 2017-08-21
Attending: INTERNAL MEDICINE
Payer: COMMERCIAL

## 2017-08-21 ENCOUNTER — OFFICE VISIT (OUTPATIENT)
Dept: DERMATOLOGY CLINIC | Facility: CLINIC | Age: 67
End: 2017-08-21

## 2017-08-21 ENCOUNTER — NURSE ONLY (OUTPATIENT)
Dept: WOUND CARE | Facility: HOSPITAL | Age: 67
End: 2017-08-21
Attending: NURSE PRACTITIONER
Payer: COMMERCIAL

## 2017-08-21 VITALS
SYSTOLIC BLOOD PRESSURE: 96 MMHG | HEIGHT: 68 IN | WEIGHT: 144 LBS | DIASTOLIC BLOOD PRESSURE: 54 MMHG | RESPIRATION RATE: 16 BRPM | BODY MASS INDEX: 21.82 KG/M2 | HEART RATE: 60 BPM | TEMPERATURE: 98 F

## 2017-08-21 DIAGNOSIS — K51.011 ULCERATIVE CHRONIC PANCOLITIS WITH RECTAL BLEEDING (HCC): Primary | ICD-10-CM

## 2017-08-21 DIAGNOSIS — L88 PYODERMA GANGRENOSA: Primary | ICD-10-CM

## 2017-08-21 DIAGNOSIS — S81.802D WOUND OF LEFT LOWER EXTREMITY, SUBSEQUENT ENCOUNTER: Primary | ICD-10-CM

## 2017-08-21 PROCEDURE — 99213 OFFICE O/P EST LOW 20 MIN: CPT | Performed by: DERMATOLOGY

## 2017-08-21 PROCEDURE — 96415 CHEMO IV INFUSION ADDL HR: CPT

## 2017-08-21 PROCEDURE — 29581 APPL MULTLAYER CMPRN SYS LEG: CPT

## 2017-08-21 PROCEDURE — 96413 CHEMO IV INFUSION 1 HR: CPT

## 2017-08-21 PROCEDURE — 99212 OFFICE O/P EST SF 10 MIN: CPT | Performed by: DERMATOLOGY

## 2017-08-21 RX ORDER — ACETAMINOPHEN 325 MG/1
650 TABLET ORAL ONCE
Status: CANCELLED | OUTPATIENT
Start: 2017-08-21

## 2017-08-21 RX ORDER — 0.9 % SODIUM CHLORIDE 0.9 %
VIAL (ML) INJECTION
Status: DISCONTINUED
Start: 2017-08-21 | End: 2017-08-21

## 2017-08-21 RX ORDER — SODIUM CHLORIDE 9 MG/ML
INJECTION, SOLUTION INTRAVENOUS
Status: DISCONTINUED
Start: 2017-08-21 | End: 2017-08-21

## 2017-08-21 RX ORDER — ACETAMINOPHEN 325 MG/1
650 TABLET ORAL ONCE
Status: COMPLETED | OUTPATIENT
Start: 2017-08-21 | End: 2017-08-21

## 2017-08-21 RX ORDER — ACETAMINOPHEN 325 MG/1
TABLET ORAL
Status: COMPLETED
Start: 2017-08-21 | End: 2017-08-21

## 2017-08-21 RX ADMIN — ACETAMINOPHEN 650 MG: 325 TABLET ORAL at 09:01:00

## 2017-08-21 NOTE — PROGRESS NOTES
Madelyn to infusion today for week 6 Remicade. She arrives alert and independent, she is early for her appointment as she has just left her wound clinic appointment. She arrives with a  dressing to her left foot for pyoderma grangrenosum .  She has slight

## 2017-08-21 NOTE — PATIENT INSTRUCTIONS
Take 15 mg prednisone each am for 2 weeks, then 10 mg every am for 2 weeks, then 5 mg every am for 2 week- f/u 6 weeks to recheck and for further taper

## 2017-08-21 NOTE — PROGRESS NOTES
Header Image    Progress Note Details  Patient Name: Rosetta Bocanegra  Patient Number: [id number]  Patient YOB: 1950  Date: 8/21/2017  RN: Brandy Teran CNA/KAREN/MAYNOR: Camilo Garvin  Physician / Parish Ramirez: Steph Felix: Mariajose Felix The periwound skin texture is normal. The periwound skin color is normal. The periwound skin exhibited: Moist. The temperature of the periwound skin is WNL. Periwound skin does not exhibit signs or symptoms of infection.       Wound #5 Left, Lateral Ankle i Wounds appear stable and improved. Patient offers no complaints and states has been changing dressing daily. Patient to continue with current treatment plan and follow up in wound clinic in one week. Instructed to call with any questions or concerns.   Pa Cleansed wound and periwound with non-cytotoxic agent. using Wound Cleanser Spray (1)  Applied topical product to virginia-wound area avoiding wound base.  using Vaseline (1)  Applied topical agent to base of wound bed. using Medihoney gel (1)  Applied Primary

## 2017-08-21 NOTE — PROGRESS NOTES
HPI:     Chief Complaint     Derm Problem        HPI     Derm Problem    Additional comments: 1 mo follow up pyoderma gangrenosa, much improved lost tube of protopic       Last edited by Janak Daniel RN on 8/21/2017  3:52 PM. (History)        Patient thi COMPLEX OR) Take by mouth daily. Disp:  Rfl:    Calcium Carbonate-Vitamin D (CALCIUM + D OR) Take by mouth daily.  Disp:  Rfl:    tacrolimus (PROTOPIC) 0.1 % External Ointment Apply to affected areas 1-2x/day Disp: 60 g Rfl: 3   predniSONE 5 MG Oral Tab branden Pt has a defibrillator No    Reaction to local anesthetic No     Social History Narrative   None on file     Family History   Problem Relation Age of Onset   • Diabetes Father    • Pacemaker Father    • Heart Disease Father    • Melanoma Father    • Kimi the past 48 hour(s)). Meds This Visit:      Imaging Orders:  None     Referral Orders:  No orders of the defined types were placed in this encounter.         8/21/2017  Shashank Morel

## 2017-08-21 NOTE — PROGRESS NOTES
Past Medical History:   Diagnosis Date   • Benign tumor of breast 1969    biopsy   • Breast lump     biopsy, benign   • Colon polyps    • Lipid screening 3/16/2013    per NG   • Nasal fracture    • Pyoderma    • Ulcerative colitis ([address] 1990s, 2001     Pas

## 2017-08-24 ENCOUNTER — APPOINTMENT (OUTPATIENT)
Dept: WOUND CARE | Facility: HOSPITAL | Age: 67
End: 2017-08-24
Payer: COMMERCIAL

## 2017-08-28 ENCOUNTER — NURSE ONLY (OUTPATIENT)
Dept: WOUND CARE | Facility: HOSPITAL | Age: 67
End: 2017-08-28
Attending: NURSE PRACTITIONER
Payer: COMMERCIAL

## 2017-08-28 DIAGNOSIS — S81.802D WOUND OF LEFT LOWER EXTREMITY, SUBSEQUENT ENCOUNTER: Primary | ICD-10-CM

## 2017-08-28 PROCEDURE — 99212 OFFICE O/P EST SF 10 MIN: CPT

## 2017-08-28 PROCEDURE — 99211 OFF/OP EST MAY X REQ PHY/QHP: CPT | Performed by: NURSE PRACTITIONER

## 2017-08-28 NOTE — PROGRESS NOTES
Chief Complaint  This information was obtained from the patient  The patient was seen today for follow up and management of difficult to heal wound(s).   7/24/17 patient took off one wrap on her left leg this am due to pressure and burning.  8/21/17 no com Wound #5 Left, Lateral Ankle is a chronic Full Thickness pyoderma gangrenosum and has received an outcome of Resolved. Subsequent wound encounter measurements are 0cm length x 0cm width x 0cm depth, with an area of 0 sq cm and a volume of 0 cubic cm.  There The Ankle wound has completely epithelized. The left lower leg wound is improving, small, completely granular, and no S&S of infection. Patient will continue with dressing change as directed.      Noticed callus on the lateral left great toe, recomme Compression wrapping  Stockinette applied using 3\" (1)   Compression applied to: using Toe bases to tibial tubercle (1)   Compression used: using Artiflex 10 cm (1), Comprilan 10 cm (1)

## 2017-09-05 ENCOUNTER — NURSE ONLY (OUTPATIENT)
Dept: WOUND CARE | Facility: HOSPITAL | Age: 67
End: 2017-09-05
Attending: CLINICAL NURSE SPECIALIST
Payer: COMMERCIAL

## 2017-09-05 DIAGNOSIS — S81.802D WOUND OF LEFT LOWER EXTREMITY, SUBSEQUENT ENCOUNTER: Primary | ICD-10-CM

## 2017-09-05 PROCEDURE — 29581 APPL MULTLAYER CMPRN SYS LEG: CPT

## 2017-09-05 NOTE — PROGRESS NOTES
Subjective    Chief Complaint  This information was obtained from the patient  The patient was seen today for follow up and management of difficult to heal wound(s). 9/5/17 no concerns for today's visit.     Allergies  adhesive tape (Severity: Mild, Reacti ICD-10  (Encounter Diagnosis) Z87.2 - Personal history of diseases of the skin and subcutaneous tissue  (Encounter Diagnosis) L88 - Pyoderma gangrenosum  (Encounter Diagnosis) A36.170H - Unspecified open wound, left lower leg, initial encounter  (Encounter Applied topical product to virginia-wound area avoiding wound base. using Vaseline (1)   Applied topical agent to base of wound bed. using Medihoney gel (1)   Applied Primary Wound Dressing. using Vaseline Gauze (1)   Applied Secondary Wound Dressing.  using Ga

## 2017-09-11 ENCOUNTER — NURSE ONLY (OUTPATIENT)
Dept: WOUND CARE | Facility: HOSPITAL | Age: 67
End: 2017-09-11
Attending: NURSE PRACTITIONER
Payer: COMMERCIAL

## 2017-09-11 DIAGNOSIS — S81.802D WOUND OF LEFT LOWER EXTREMITY, SUBSEQUENT ENCOUNTER: Primary | ICD-10-CM

## 2017-09-11 PROCEDURE — 99211 OFF/OP EST MAY X REQ PHY/QHP: CPT | Performed by: NURSE PRACTITIONER

## 2017-09-11 PROCEDURE — 99212 OFFICE O/P EST SF 10 MIN: CPT

## 2017-09-11 NOTE — PROGRESS NOTES
Chief Complaint  This information was obtained from the patient  The patient was seen today for follow up and management of difficult to heal wound(s). 9/11/17 no concerns for today's visit.     Allergies  adhesive tape (Severity: Mild, Reaction: sensitivi encounter  (Encounter Diagnosis) S21.202A - Unspecified open wound of left back wall of thorax without penetration into thoracic cavity, initial encounter  (Encounter Diagnosis) L89.152 - Pressure ulcer of sacral region, stage 2    Diagnoses    ICD-10  R83

## 2017-09-13 ENCOUNTER — PATIENT MESSAGE (OUTPATIENT)
Dept: GASTROENTEROLOGY | Facility: CLINIC | Age: 67
End: 2017-09-13

## 2017-09-18 ENCOUNTER — APPOINTMENT (OUTPATIENT)
Dept: WOUND CARE | Facility: HOSPITAL | Age: 67
End: 2017-09-18
Attending: CLINICAL NURSE SPECIALIST
Payer: COMMERCIAL

## 2017-09-18 NOTE — TELEPHONE ENCOUNTER
From: Sanchez Smith  To: Remedios Mason MD  Sent: 9/13/2017 10:23 PM CDT  Subject: Visit Follow-up Question    Dr Kaila Richard,   I am not been able to schedule an appt to see you via this website for the rest of 2017.   Wound care released me this

## 2017-09-18 NOTE — TELEPHONE ENCOUNTER
Please tell patient to plan OV for January, 2018.  I anticipate the Remicaide treatment at least 6 months to 1 year

## 2017-09-21 ENCOUNTER — OFFICE VISIT (OUTPATIENT)
Dept: INTERNAL MEDICINE CLINIC | Facility: CLINIC | Age: 67
End: 2017-09-21

## 2017-09-21 ENCOUNTER — LAB ENCOUNTER (OUTPATIENT)
Dept: LAB | Facility: HOSPITAL | Age: 67
End: 2017-09-21
Attending: INTERNAL MEDICINE
Payer: COMMERCIAL

## 2017-09-21 ENCOUNTER — TELEPHONE (OUTPATIENT)
Dept: OBGYN CLINIC | Facility: CLINIC | Age: 67
End: 2017-09-21

## 2017-09-21 ENCOUNTER — TELEPHONE (OUTPATIENT)
Dept: GASTROENTEROLOGY | Facility: CLINIC | Age: 67
End: 2017-09-21

## 2017-09-21 VITALS
RESPIRATION RATE: 18 BRPM | SYSTOLIC BLOOD PRESSURE: 97 MMHG | WEIGHT: 145.69 LBS | HEART RATE: 75 BPM | BODY MASS INDEX: 22.08 KG/M2 | HEIGHT: 68 IN | DIASTOLIC BLOOD PRESSURE: 59 MMHG

## 2017-09-21 DIAGNOSIS — E55.9 VITAMIN D DEFICIENCY: ICD-10-CM

## 2017-09-21 DIAGNOSIS — K51.011 ULCERATIVE PANCOLITIS WITH RECTAL BLEEDING (HCC): ICD-10-CM

## 2017-09-21 DIAGNOSIS — R79.89 ABNORMAL THYROID BLOOD TEST: ICD-10-CM

## 2017-09-21 DIAGNOSIS — D50.0 IRON DEFICIENCY ANEMIA DUE TO CHRONIC BLOOD LOSS: ICD-10-CM

## 2017-09-21 DIAGNOSIS — N81.11 CYSTOCELE, MIDLINE: ICD-10-CM

## 2017-09-21 DIAGNOSIS — Z78.0 ASYMPTOMATIC MENOPAUSE: ICD-10-CM

## 2017-09-21 DIAGNOSIS — Z00.00 ROUTINE HEALTH MAINTENANCE: Primary | ICD-10-CM

## 2017-09-21 PROBLEM — T38.0X5A STEROID ACNE: Status: RESOLVED | Noted: 2017-05-27 | Resolved: 2017-09-21

## 2017-09-21 PROBLEM — L70.8 STEROID ACNE: Status: RESOLVED | Noted: 2017-05-27 | Resolved: 2017-09-21

## 2017-09-21 PROBLEM — S21.202A: Status: RESOLVED | Noted: 2017-06-22 | Resolved: 2017-09-21

## 2017-09-21 LAB
ALBUMIN SERPL BCP-MCNC: 3.6 G/DL (ref 3.5–4.8)
ANION GAP SERPL CALC-SCNC: 7 MMOL/L (ref 0–18)
BASOPHILS # BLD: 0.1 K/UL (ref 0–0.2)
BASOPHILS NFR BLD: 1 %
BUN SERPL-MCNC: 14 MG/DL (ref 8–20)
BUN/CREAT SERPL: 21.2 (ref 10–20)
CALCIUM SERPL-MCNC: 9.4 MG/DL (ref 8.5–10.5)
CHLORIDE SERPL-SCNC: 106 MMOL/L (ref 95–110)
CO2 SERPL-SCNC: 29 MMOL/L (ref 22–32)
CREAT SERPL-MCNC: 0.66 MG/DL (ref 0.5–1.5)
EOSINOPHIL # BLD: 0.2 K/UL (ref 0–0.7)
EOSINOPHIL NFR BLD: 3 %
ERYTHROCYTE [DISTWIDTH] IN BLOOD BY AUTOMATED COUNT: 16.2 % (ref 11–15)
FERRITIN SERPL IA-MCNC: 15 NG/ML (ref 11–307)
GLUCOSE SERPL-MCNC: 91 MG/DL (ref 70–99)
HCT VFR BLD AUTO: 38.3 % (ref 35–48)
HGB BLD-MCNC: 12.6 G/DL (ref 12–16)
IRON SATN MFR SERPL: 14 % (ref 15–50)
IRON SERPL-MCNC: 48 MCG/DL (ref 28–170)
LYMPHOCYTES # BLD: 1.3 K/UL (ref 1–4)
LYMPHOCYTES NFR BLD: 18 %
MCH RBC QN AUTO: 30.5 PG (ref 27–32)
MCHC RBC AUTO-ENTMCNC: 32.9 G/DL (ref 32–37)
MCV RBC AUTO: 92.8 FL (ref 80–100)
MONOCYTES # BLD: 0.6 K/UL (ref 0–1)
MONOCYTES NFR BLD: 8 %
NEUTROPHILS # BLD AUTO: 5.2 K/UL (ref 1.8–7.7)
NEUTROPHILS NFR BLD: 71 %
OSMOLALITY UR CALC.SUM OF ELEC: 294 MOSM/KG (ref 275–295)
PHOSPHATE SERPL-MCNC: 4.2 MG/DL (ref 2.4–4.7)
PLATELET # BLD AUTO: 253 K/UL (ref 140–400)
PMV BLD AUTO: 8.8 FL (ref 7.4–10.3)
POTASSIUM SERPL-SCNC: 3.9 MMOL/L (ref 3.3–5.1)
RBC # BLD AUTO: 4.12 M/UL (ref 3.7–5.4)
SODIUM SERPL-SCNC: 142 MMOL/L (ref 136–144)
TIBC SERPL-MCNC: 346 MCG/DL (ref 228–428)
TRANSFERRIN SERPL-MCNC: 262 MG/DL (ref 192–382)
TSH SERPL-ACNC: 3.26 UIU/ML (ref 0.45–5.33)
VIT B12 SERPL-MCNC: 561 PG/ML (ref 181–914)
WBC # BLD AUTO: 7.3 K/UL (ref 4–11)

## 2017-09-21 PROCEDURE — 84443 ASSAY THYROID STIM HORMONE: CPT

## 2017-09-21 PROCEDURE — 85025 COMPLETE CBC W/AUTO DIFF WBC: CPT

## 2017-09-21 PROCEDURE — 82306 VITAMIN D 25 HYDROXY: CPT

## 2017-09-21 PROCEDURE — 36415 COLL VENOUS BLD VENIPUNCTURE: CPT

## 2017-09-21 PROCEDURE — 80069 RENAL FUNCTION PANEL: CPT

## 2017-09-21 PROCEDURE — 83540 ASSAY OF IRON: CPT

## 2017-09-21 PROCEDURE — 99397 PER PM REEVAL EST PAT 65+ YR: CPT | Performed by: INTERNAL MEDICINE

## 2017-09-21 PROCEDURE — 82728 ASSAY OF FERRITIN: CPT

## 2017-09-21 PROCEDURE — 82607 VITAMIN B-12: CPT

## 2017-09-21 PROCEDURE — 84466 ASSAY OF TRANSFERRIN: CPT

## 2017-09-21 PROCEDURE — 99213 OFFICE O/P EST LOW 20 MIN: CPT | Performed by: INTERNAL MEDICINE

## 2017-09-21 PROCEDURE — 99212 OFFICE O/P EST SF 10 MIN: CPT | Performed by: INTERNAL MEDICINE

## 2017-09-21 NOTE — TELEPHONE ENCOUNTER
Received letter dated 9/19/17 from [facility] from Dr. Gill Cool. Placed on [facility]'s desk.

## 2017-09-21 NOTE — ASSESSMENT & PLAN NOTE
Unremarkable exam.  She is up-to-date on her colonoscopy and mammogram.  She was advised not to get vaccines at this time due to the pyoderma. Will do pneumovax at next appointment. Reviewed labs with pt.

## 2017-09-21 NOTE — PROGRESS NOTES
HPI:    Patient ID: Arianna Barros is a 78year old female. Pt is here for a physical.    She had an ulcerative colitis flare and pyoderma gangrenosum. They are finally healing. The UC is under control. She also has a prolapsed uterus.   She has a p into the vein. Disp:  Rfl:    PROTEIN OR Take by mouth daily. Disp:  Rfl:    B Complex Vitamins (VITAMIN-B COMPLEX OR) Take by mouth daily. Disp:  Rfl:    Calcium Carbonate-Vitamin D (CALCIUM + D OR) Take by mouth daily.  Disp:  Rfl:    predniSONE 5 MG Oral Conjunctivae and EOM are normal. Pupils are equal, round, and reactive to light. Neck: Normal range of motion. Neck supple. No thyromegaly present. Cardiovascular: Normal rate, regular rhythm and normal heart sounds. No murmur heard.    Edema not pre thyroid test.         Relevant Orders    TSH W REFLEX TO FREE T4      Other Visit Diagnoses    None.              Meds This Visit:  No prescriptions requested or ordered in this encounter

## 2017-09-22 LAB — 25(OH)D3 SERPL-MCNC: 65 NG/ML

## 2017-09-25 ENCOUNTER — APPOINTMENT (OUTPATIENT)
Dept: WOUND CARE | Facility: HOSPITAL | Age: 67
End: 2017-09-25
Attending: NURSE PRACTITIONER
Payer: COMMERCIAL

## 2017-09-26 ENCOUNTER — TELEPHONE (OUTPATIENT)
Dept: PEDIATRICS CLINIC | Facility: CLINIC | Age: 67
End: 2017-09-26

## 2017-09-29 ENCOUNTER — PATIENT MESSAGE (OUTPATIENT)
Dept: INTERNAL MEDICINE CLINIC | Facility: CLINIC | Age: 67
End: 2017-09-29

## 2017-09-29 ENCOUNTER — TELEPHONE (OUTPATIENT)
Dept: INTERNAL MEDICINE CLINIC | Facility: CLINIC | Age: 67
End: 2017-09-29

## 2017-09-29 ENCOUNTER — HOSPITAL ENCOUNTER (OUTPATIENT)
Dept: BONE DENSITY | Facility: HOSPITAL | Age: 67
Discharge: HOME | End: 2017-09-29
Attending: INTERNAL MEDICINE
Payer: COMMERCIAL

## 2017-09-29 ENCOUNTER — OFFICE VISIT (OUTPATIENT)
Dept: OBGYN CLINIC | Facility: CLINIC | Age: 67
End: 2017-09-29

## 2017-09-29 VITALS — DIASTOLIC BLOOD PRESSURE: 60 MMHG | HEART RATE: 69 BPM | SYSTOLIC BLOOD PRESSURE: 101 MMHG

## 2017-09-29 DIAGNOSIS — Z01.818 PRE-OPERATIVE CLEARANCE: Primary | ICD-10-CM

## 2017-09-29 DIAGNOSIS — Z78.0 ASYMPTOMATIC MENOPAUSE: ICD-10-CM

## 2017-09-29 DIAGNOSIS — N81.3 COMPLETE UTERINE PROLAPSE: Primary | ICD-10-CM

## 2017-09-29 DIAGNOSIS — Z01.818 PRE-OP TESTING: Primary | ICD-10-CM

## 2017-09-29 PROCEDURE — 77080 DXA BONE DENSITY AXIAL: CPT | Performed by: INTERNAL MEDICINE

## 2017-09-29 PROCEDURE — 99213 OFFICE O/P EST LOW 20 MIN: CPT | Performed by: OBSTETRICS & GYNECOLOGY

## 2017-09-29 NOTE — TELEPHONE ENCOUNTER
From: Jc Robb  To: Do Benson MD  Sent: 9/29/2017 9:05 AM CDT  Subject: Other    Hello Dr Frances Acevedo, I just left Dr Kiko Petersen for my pessary maintenance & she advised me to contact you to get clearance for my upcoming surgery of my prolapsed uterus.

## 2017-09-29 NOTE — TELEPHONE ENCOUNTER
Oscar Brady MD     9/29/17 2:13 PM   Note      Pt should be clear for surgery. All she would need is an EKG. EKG ordered and pt notified by anson.

## 2017-09-29 NOTE — TELEPHONE ENCOUNTER
**SEE Pt email communication with details**Pt is calling to surgical clearance for surgery with Dr. Neli Voss and states just had a PX last week and surgery will be early 11/2017.

## 2017-10-02 ENCOUNTER — APPOINTMENT (OUTPATIENT)
Dept: WOUND CARE | Facility: HOSPITAL | Age: 67
End: 2017-10-02
Attending: NURSE PRACTITIONER
Payer: COMMERCIAL

## 2017-10-02 NOTE — TELEPHONE ENCOUNTER
Spoke with patient advised her that order is in for the EKG. Patient stated once she hears from [address] she will  Have it done.  Patient stated she will have it done at [address]  Patient stated [facility] is on the same system as [place].

## 2017-10-02 NOTE — PROGRESS NOTES
Jazmin Garsia is a 78year old female  No LMP recorded. Patient is postmenopausal. Patient presents with:  Gyn Problem: PESSARY CLEANNING -- saw Dr. Adelaide Mijares & was told she will contact me to schedule surgery together -- wants to do sling  . Disp: , Rfl:   •  B Complex Vitamins (VITAMIN-B COMPLEX OR), Take by mouth daily. , Disp: , Rfl:   •  Calcium Carbonate-Vitamin D (CALCIUM + D OR), Take by mouth daily. , Disp: , Rfl:   •  predniSONE 5 MG Oral Tab, take as directed, Disp: 50 tablet, Rfl: 1 inguinal lymph nodes    Assessment & Plan:  Avinash Burciaga was seen today for gyn problem.     Diagnoses and all orders for this visit:    Complete uterine prolapse          No prescriptions requested or ordered in this encounter    Pessary removed, cleansed & re

## 2017-10-05 ENCOUNTER — OFFICE VISIT (OUTPATIENT)
Dept: DERMATOLOGY CLINIC | Facility: CLINIC | Age: 67
End: 2017-10-05

## 2017-10-05 DIAGNOSIS — L88 PYODERMA GANGRENOSA: Primary | ICD-10-CM

## 2017-10-05 DIAGNOSIS — L91.0 KELOID SCAR: ICD-10-CM
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
## 2017-10-05 PROCEDURE — 99213 OFFICE O/P EST LOW 20 MIN: CPT | Performed by: DERMATOLOGY

## 2017-10-05 RX ORDER — LIDOCAINE AND PRILOCAINE 25; 25 MG/G; MG/G
CREAM TOPICAL
Refills: 3 | COMMUNITY
Start: 2017-07-19 | End: 2017-10-05 | Stop reason: ALTCHOICE

## 2017-10-05 RX ORDER — HYDROCODONE BITARTRATE AND ACETAMINOPHEN 5; 325 MG/1; MG/1
TABLET ORAL
Refills: 0 | COMMUNITY
Start: 2017-07-19 | End: 2017-10-05 | Stop reason: ALTCHOICE

## 2017-10-05 NOTE — PROGRESS NOTES
HPI:     Chief Complaint     Lesion        HPI     Lesion    Additional comments: LOV 8/21/2017. Pt presenting for 6 week f/u with with pyoderma gangrenosa to back and L leg. Pt currently taking Prednisone 5mg every other day.         Last edited by Max Branch fracture    • Pyoderma    • Ulcerative colitis ([facility] [address] 1990s, 2001     Past Surgical History:  1967: BENIGN BIOPSY RIGHT  No date: BREAST SURGERY      Comment: Biopsy (Benign Breast tumor), Biopsy (Breast                lump, Benign)  2014: COLONOSCOPY    So wean off the prednisone as long as GI does not need her on this. Before weaning off she will go to 2 and half milligrams every other day for 2 weeks. I have told patient that the Remicade is proving to be an excellent drug for pyoderma gangrenosum.   She

## 2017-10-06 NOTE — TELEPHONE ENCOUNTER
Spoke to pt.      Future Appointments  Date Time Provider Nadine Maggie   10/18/2017 3:30 PM [facility] 4 [facility]   12/13/2017 3:00 PM [facility]N 1 [facility]   12/27/2017 3:45 PM René Mehta MD [address] [place] [address]

## 2017-10-06 NOTE — TELEPHONE ENCOUNTER
Dr Vangie Perera,    Pt is having surgery for uterine prolapse and bladder sling in November. She would like to clarify that Remicade is ok to take prior to the surgery. She is scheduled on 10/18/18 for her next infusion.

## 2017-10-07 RX ORDER — METRONIDAZOLE 500 MG/1
250 TABLET ORAL EVERY 8 HOURS
Qty: 42 TABLET | Refills: 0 | Status: SHIPPED | OUTPATIENT
Start: 2017-10-07 | End: 2017-11-15 | Stop reason: ALTCHOICE

## 2017-10-07 NOTE — TELEPHONE ENCOUNTER
Remicaide should be fine prior to surgery. Please note that if she is going to receive broad-spectrum antibiotics, she should get coverage with metronidazole to prevent C.  Difficile ( Erx sent)

## 2017-10-09 ENCOUNTER — APPOINTMENT (OUTPATIENT)
Dept: WOUND CARE | Facility: HOSPITAL | Age: 67
End: 2017-10-09
Attending: NURSE PRACTITIONER
Payer: COMMERCIAL

## 2017-10-09 NOTE — TELEPHONE ENCOUNTER
Telephone Information:   Home Phone [phone]   Work Phone [phone]   [employer] [phone]   Work Phone [phone]     Mickey on cell phone

## 2017-10-11 ENCOUNTER — PATIENT MESSAGE (OUTPATIENT)
Dept: INTERNAL MEDICINE CLINIC | Facility: CLINIC | Age: 67
End: 2017-10-11

## 2017-10-11 ENCOUNTER — TELEPHONE (OUTPATIENT)
Dept: GASTROENTEROLOGY | Facility: CLINIC | Age: 67
End: 2017-10-11

## 2017-10-11 DIAGNOSIS — K51.919 ULCERATIVE COLITIS WITH COMPLICATION, UNSPECIFIED LOCATION (HCC): Primary | ICD-10-CM

## 2017-10-11 NOTE — TELEPHONE ENCOUNTER
Routed to Kraig/RN, pt requesting to speak with her about upcoming office appt with Dr. Hailey Davis.

## 2017-10-11 NOTE — TELEPHONE ENCOUNTER
Please refer to 09/13/17 my chart encounter(e mail)           7:15 AM   Note      OK to schedule colonoscopy, 12/28 or 12 29, dx: ulcerative colitis, split dose miralax prep, conscious sedation

## 2017-10-11 NOTE — TELEPHONE ENCOUNTER
Dr Meera Croft:    Since pt has her Colonoscopy scheduled for 12/29/17 can I cancel her appt with you on 12/27? And do you want to see her for an OV or is it ok for her to just have the procedure?  I can have her come in November    Please advise  Future appointme

## 2017-10-11 NOTE — TELEPHONE ENCOUNTER
Scheduled for:  Colonoscopy - [address]1976  Provider Name:  Dr. Latrice Markham  Date:  12/29/17  Location:  [facility]  Sedation:  IV  Time:  10:00 am (pt is aware to arrive at 9:00 am)  Prep:  Miralax/Gatorade, Prep instructions were given to pt over the phone, pt verbalized und

## 2017-10-12 ENCOUNTER — TELEPHONE (OUTPATIENT)
Dept: OBGYN CLINIC | Facility: CLINIC | Age: 67
End: 2017-10-12

## 2017-10-12 NOTE — TELEPHONE ENCOUNTER
Pt contacted and reviewed Dr. Sofi Daly message below, she verbalized understanding and pt removed from provider schedule for OV appt on 12/27/17. She is aware to keep her CLN appt on 12/29/17. No further questions at this time.

## 2017-10-12 NOTE — TELEPHONE ENCOUNTER
From: Didi Starr  To: Suzi Laura MD  Sent: 10/11/2017 10:44 PM CDT  Subject: Test Results Question    Hi Dr Kemp Loud,  I had my EKG done yesterday (10/10/17) at [address] [place] [address] in [place] & would like to know if that is sufficient to get the [facility]

## 2017-10-16 ENCOUNTER — APPOINTMENT (OUTPATIENT)
Dept: HEMATOLOGY/ONCOLOGY | Facility: HOSPITAL | Age: 67
End: 2017-10-16
Attending: INTERNAL MEDICINE
Payer: COMMERCIAL

## 2017-10-16 ENCOUNTER — APPOINTMENT (OUTPATIENT)
Dept: WOUND CARE | Facility: HOSPITAL | Age: 67
End: 2017-10-16
Attending: NURSE PRACTITIONER
Payer: COMMERCIAL

## 2017-10-16 ENCOUNTER — TELEPHONE (OUTPATIENT)
Dept: INTERNAL MEDICINE CLINIC | Facility: CLINIC | Age: 67
End: 2017-10-16

## 2017-10-16 ENCOUNTER — PATIENT MESSAGE (OUTPATIENT)
Dept: OBGYN CLINIC | Facility: CLINIC | Age: 67
End: 2017-10-16

## 2017-10-16 ENCOUNTER — TELEPHONE (OUTPATIENT)
Dept: OBGYN CLINIC | Facility: CLINIC | Age: 67
End: 2017-10-16

## 2017-10-16 NOTE — TELEPHONE ENCOUNTER
Left detailed message for Sharmila Freitas to call back.  Please transfer call to [address] [facility] @ ext [phone] until 7 pm .

## 2017-10-16 NOTE — TELEPHONE ENCOUNTER
Spoke with Андрей he stated surgery has not been scheduled yet. Андрей is trying to reach 's office. Андрей was given the number to call [phone]. Bulmaro Albert once surgery is scheduled to please let us know if she needs clearance.

## 2017-10-16 NOTE — TELEPHONE ENCOUNTER
Neela Pavon would like to speak with [facility] regarding a surgery with Dr. Wan Prakash, states has in mind 11/06 @10:30am for surgery. pls adv.

## 2017-10-16 NOTE — TELEPHONE ENCOUNTER
Dr Elias Presumaidee is calling to schedule poss surgery with dr Javid Rojas on 11/06/2017 at 1030 pls call back

## 2017-10-16 NOTE — TELEPHONE ENCOUNTER
From: Jazmin Garsia  To: Suki Cabrales MD  Sent: 10/16/2017 1:01 PM CDT  Subject: Other    My employer's HR dept will be providing the necessary documentation (for disability release) that I will be forwarding to you for my upcoming surgery on Nov 6th

## 2017-10-17 ENCOUNTER — PATIENT MESSAGE (OUTPATIENT)
Dept: GASTROENTEROLOGY | Facility: CLINIC | Age: 67
End: 2017-10-17

## 2017-10-17 ENCOUNTER — APPOINTMENT (OUTPATIENT)
Dept: HEMATOLOGY/ONCOLOGY | Facility: HOSPITAL | Age: 67
End: 2017-10-17
Attending: INTERNAL MEDICINE
Payer: COMMERCIAL

## 2017-10-17 ENCOUNTER — PATIENT MESSAGE (OUTPATIENT)
Dept: INTERNAL MEDICINE CLINIC | Facility: CLINIC | Age: 67
End: 2017-10-17

## 2017-10-18 ENCOUNTER — OFFICE VISIT (OUTPATIENT)
Dept: HEMATOLOGY/ONCOLOGY | Facility: HOSPITAL | Age: 67
End: 2017-10-18
Attending: INTERNAL MEDICINE
Payer: COMMERCIAL

## 2017-10-18 VITALS
DIASTOLIC BLOOD PRESSURE: 51 MMHG | BODY MASS INDEX: 22 KG/M2 | SYSTOLIC BLOOD PRESSURE: 98 MMHG | RESPIRATION RATE: 16 BRPM | HEART RATE: 61 BPM | TEMPERATURE: 99 F | WEIGHT: 146 LBS

## 2017-10-18 DIAGNOSIS — K51.011 ULCERATIVE CHRONIC PANCOLITIS WITH RECTAL BLEEDING (HCC): Primary | ICD-10-CM

## 2017-10-18 PROCEDURE — 96413 CHEMO IV INFUSION 1 HR: CPT

## 2017-10-18 PROCEDURE — 96415 CHEMO IV INFUSION ADDL HR: CPT

## 2017-10-18 RX ORDER — ACETAMINOPHEN 325 MG/1
650 TABLET ORAL ONCE
Status: CANCELLED | OUTPATIENT
Start: 2017-10-18

## 2017-10-18 RX ORDER — ACETAMINOPHEN 160 MG/5ML
SOLUTION ORAL
Status: DISCONTINUED
Start: 2017-10-18 | End: 2017-10-18

## 2017-10-18 RX ORDER — ACETAMINOPHEN 325 MG/1
650 TABLET ORAL ONCE
Status: COMPLETED | OUTPATIENT
Start: 2017-10-18 | End: 2017-10-18

## 2017-10-18 RX ORDER — SODIUM CHLORIDE 9 MG/ML
INJECTION, SOLUTION INTRAVENOUS
Status: DISCONTINUED
Start: 2017-10-18 | End: 2017-10-18

## 2017-10-18 RX ADMIN — ACETAMINOPHEN 650 MG: 325 TABLET ORAL at 16:08:00

## 2017-10-18 NOTE — TELEPHONE ENCOUNTER
From: Caitlyn Gonzalez  To: Dickson Mazariegos MD  Sent: 10/17/2017 11:25 PM CDT  Subject: Test Results Question    on 10/16/17 I faxed my EKG results per April's request in [address] last week.   This was faxed to [phone]- [place] location, as I was advise

## 2017-10-18 NOTE — TELEPHONE ENCOUNTER
The EKG is okay, but if they want me to clear the pt, I will need to see her.   Do they want me to see her or is Dr. Megan Modi doing the pre-op physical?

## 2017-10-18 NOTE — TELEPHONE ENCOUNTER
From: Jessica Reeves  To: Karlene Carnes MD  Sent: 10/17/2017 11:06 PM CDT  Subject: Prescription Question    Earlier this month you prescribed metronidazole to be taken if I am going to be given \"broad spectrum antibiotics\".  Assumedly thi

## 2017-10-18 NOTE — TELEPHONE ENCOUNTER
The patient should take metronidazole for 1 day prior to the November 6 surgery (November 5), and continued till 1 day after the surgical antibiotics are completed

## 2017-10-19 ENCOUNTER — TELEPHONE (OUTPATIENT)
Dept: INTERNAL MEDICINE CLINIC | Facility: CLINIC | Age: 67
End: 2017-10-19

## 2017-10-19 NOTE — TELEPHONE ENCOUNTER
Tammi Chavez is calling in regards of pt being scheduled to have surgery interior,posterior repair, vaginal fault suspension, and sling  on 11-6. The office doesn't need clearance.  They are just calling to relay the information to the Dr that she is having ventura

## 2017-10-19 NOTE — TELEPHONE ENCOUNTER
Pt notified she needs to schedule an appt with Dr Calista Anderson for Pre-Op clearance. Pt states she is aware of this and has an appt on 10/23/17. Assisted pt with scheduling Post-Op appt on 12/7/17. Pt also requesting a doctor's note to work from home.  Pt state

## 2017-10-19 NOTE — TELEPHONE ENCOUNTER
Okay, but the pt has been on systemic steroids for her ulcerative colitis and pyoderma gangrenosum and will need perioperative stress dose steroids.   Please relay that information to the doctor

## 2017-10-20 NOTE — TELEPHONE ENCOUNTER
Dr. Marian Smith office says she doesn't need the pre-op H and P, but Dr. Haile Martinez office says she does. There are 2 doctors doing the surgery. I think she will need it due to the possible need for stress steroids. She is scheduled for Monday.   Surgery is Nov

## 2017-10-21 ENCOUNTER — LAB ENCOUNTER (OUTPATIENT)
Dept: LAB | Facility: HOSPITAL | Age: 67
End: 2017-10-21
Attending: INTERNAL MEDICINE
Payer: COMMERCIAL

## 2017-10-21 DIAGNOSIS — Z01.818 PRE-OP TESTING: ICD-10-CM

## 2017-10-21 PROCEDURE — 85025 COMPLETE CBC W/AUTO DIFF WBC: CPT

## 2017-10-21 PROCEDURE — 82533 TOTAL CORTISOL: CPT

## 2017-10-21 PROCEDURE — 87086 URINE CULTURE/COLONY COUNT: CPT | Performed by: INTERNAL MEDICINE

## 2017-10-21 PROCEDURE — 36415 COLL VENOUS BLD VENIPUNCTURE: CPT

## 2017-10-21 PROCEDURE — 80048 BASIC METABOLIC PNL TOTAL CA: CPT

## 2017-10-21 PROCEDURE — 81001 URINALYSIS AUTO W/SCOPE: CPT | Performed by: INTERNAL MEDICINE

## 2017-10-21 PROCEDURE — 87186 SC STD MICRODIL/AGAR DIL: CPT | Performed by: INTERNAL MEDICINE

## 2017-10-21 PROCEDURE — 87088 URINE BACTERIA CULTURE: CPT | Performed by: INTERNAL MEDICINE

## 2017-10-23 ENCOUNTER — APPOINTMENT (OUTPATIENT)
Dept: WOUND CARE | Facility: HOSPITAL | Age: 67
End: 2017-10-23
Attending: NURSE PRACTITIONER
Payer: COMMERCIAL

## 2017-10-23 ENCOUNTER — OFFICE VISIT (OUTPATIENT)
Dept: INTERNAL MEDICINE CLINIC | Facility: CLINIC | Age: 67
End: 2017-10-23

## 2017-10-23 VITALS
RESPIRATION RATE: 18 BRPM | BODY MASS INDEX: 22.01 KG/M2 | DIASTOLIC BLOOD PRESSURE: 64 MMHG | HEIGHT: 68 IN | WEIGHT: 145.19 LBS | HEART RATE: 65 BPM | SYSTOLIC BLOOD PRESSURE: 109 MMHG

## 2017-10-23 DIAGNOSIS — N30.01 ACUTE CYSTITIS WITH HEMATURIA: ICD-10-CM

## 2017-10-23 DIAGNOSIS — Z01.818 PRE-OP EXAM: Primary | ICD-10-CM

## 2017-10-23 DIAGNOSIS — Z92.241 HISTORY OF RECENT STEROID USE: ICD-10-CM

## 2017-10-23 DIAGNOSIS — N81.3 COMPLETE UTERINE PROLAPSE: ICD-10-CM

## 2017-10-23 DIAGNOSIS — K51.00 ULCERATIVE PANCOLITIS WITHOUT COMPLICATION (HCC): ICD-10-CM

## 2017-10-23 PROBLEM — Z00.00 ROUTINE HEALTH MAINTENANCE: Status: RESOLVED | Noted: 2017-09-21 | Resolved: 2017-10-23

## 2017-10-23 PROBLEM — K51.011 ULCERATIVE CHRONIC PANCOLITIS WITH RECTAL BLEEDING (HCC): Status: RESOLVED | Noted: 2017-06-13 | Resolved: 2017-10-23

## 2017-10-23 PROBLEM — Z78.0 ASYMPTOMATIC MENOPAUSE: Status: RESOLVED | Noted: 2017-09-21 | Resolved: 2017-10-23

## 2017-10-23 PROCEDURE — 99212 OFFICE O/P EST SF 10 MIN: CPT | Performed by: INTERNAL MEDICINE

## 2017-10-23 PROCEDURE — 99243 OFF/OP CNSLTJ NEW/EST LOW 30: CPT | Performed by: INTERNAL MEDICINE

## 2017-10-23 RX ORDER — CEPHALEXIN 500 MG/1
500 CAPSULE ORAL 2 TIMES DAILY
Qty: 14 CAPSULE | Refills: 0 | Status: SHIPPED | OUTPATIENT
Start: 2017-10-23 | End: 2018-02-20

## 2017-10-23 NOTE — TELEPHONE ENCOUNTER
Spoke with Alem at [facility] office advised her patient had pre op exam today. Alem asked that H&P be faxed to [phone]. Advised her once it is completed it will be faxed over.

## 2017-10-23 NOTE — PROGRESS NOTES
HPI:    Patient ID: Reddy Martino is a 78year old female.     Pt is here for pre-op: POLLY BSO possible total vaginal hysterectomy with possible bilateral salpingo- oophorectomy, uterosacral suspension vaginal vault, anterior posterior repair, Nataliia Griffin Allergic/Immunologic: Negative for environmental allergies and food allergies. Neurological: Negative for headaches. Psychiatric/Behavioral: Negative for depressed mood. The patient is not nervous/anxious.         Past Surgical History:  1967: BENIGN ./64 (BP Location: Right arm, Patient Position: Sitting, Cuff Size: adult)   Pulse 65   Resp 18   Ht 5' 8\" (1.727 m)   Wt 145 lb 3.2 oz (65.9 kg)   BMI 22.08 kg/m²   PHYSICAL EXAM:   Physical Exam    Constitutional: She is oriented to person, her cortisol level is 16.2. Per UpToDate she does not need stress steroids for surgery. 5. Ulcerative colitis  This is currently in remission.

## 2017-10-25 ENCOUNTER — TELEPHONE (OUTPATIENT)
Dept: OBGYN CLINIC | Facility: CLINIC | Age: 67
End: 2017-10-25

## 2017-10-25 ENCOUNTER — PATIENT MESSAGE (OUTPATIENT)
Dept: GASTROENTEROLOGY | Facility: CLINIC | Age: 67
End: 2017-10-25

## 2017-10-26 NOTE — TELEPHONE ENCOUNTER
From: Brianna Owusu  To: Tono Issa MD  Sent: 10/25/2017 1:11 PM CDT  Subject: Prescription Question    Dr Temi Mcmanus,  I'm having a lot of hair loss.  I'm aware that my hair thins out & falls out easier when I'm on prednisone but it's become

## 2017-10-30 ENCOUNTER — APPOINTMENT (OUTPATIENT)
Dept: WOUND CARE | Facility: HOSPITAL | Age: 67
End: 2017-10-30
Attending: NURSE PRACTITIONER
Payer: COMMERCIAL

## 2017-10-30 PROBLEM — N81.2 INCOMPLETE UTEROVAGINAL PROLAPSE: Status: ACTIVE | Noted: 2017-10-30

## 2017-10-30 PROBLEM — N39.3 STRESS INCONTINENCE: Status: ACTIVE | Noted: 2017-10-30

## 2017-10-30 NOTE — H&P
[address] Patient Status:  [facility] Outpatient Surgery    1950 MRN [id number]   [place] [address] Attending Jass Miguel MD   Hosp Day # 0 PCP Danitza Joseph MD     Principal Problem: Positive for wound. 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 Allergic/Immunologic: Positive for immunocompromised state. Neurological: Negative. Hematological: Negative. Psychiatric/Behavioral: Negative.         Physical

## 2017-10-30 NOTE — TELEPHONE ENCOUNTER
I do not have any specific suggestions other than what Dr. Eun Morrissey recommended, and a high protein diet. Gavino Torres Unfortunately this is 1 of the side effects of Remicade.

## 2017-10-31 ENCOUNTER — TELEPHONE (OUTPATIENT)
Dept: ADMINISTRATIVE | Age: 67
End: 2017-10-31

## 2017-10-31 NOTE — TELEPHONE ENCOUNTER
Good afternoon Dr. Adams Eng form pending in [facility]. Patient has surgery scheduled 11-6-17 and would like to return to working from home on 11-13-17. Return to full duty to be determined at her 12-7-17 post-op appointment. Do you approve? Please advise.     Thanks  Juliann Gonzalez

## 2017-11-01 NOTE — TELEPHONE ENCOUNTER
Based on work commitments. Pt may still be on narcotics for healing.  Cannot work if taking narcotics

## 2017-11-03 ENCOUNTER — TELEPHONE (OUTPATIENT)
Dept: OBGYN CLINIC | Facility: CLINIC | Age: 67
End: 2017-11-03

## 2017-11-03 NOTE — TELEPHONE ENCOUNTER
SPOKE WITH SARAH IN [place] AND SHE WILL HAVE THE RN'S CALL PT NOW. I CALLED PT TO LET HER KNOW THIS AND SHE SAID SHE WAS JUST CONTACTED AND ADVISED TO ARRIVE AT 5:45AM MON MORNING. ENCOURAGED TO CALL BACK WITH ANY QUESTIONS OR CONCERNS.

## 2017-11-03 NOTE — TELEPHONE ENCOUNTER
Dr. Noreen Stevens    Please sign off on forms:  -Highlight the patient and hit \"Chart\" button. -In Chart Review, w/in the Encounter tab - open the Telephone call encounter for 2 forms  [email or web address]_Dr. [id number] and PPD_Guardian Disability_Dr [id number]   Scroll down.  -Click on Acknowledge button and left-click onto image, signature stamp appears and drag signature to Provider signature line. Stamp will turn blue. Close window.  -Please sign both forms.     Thank you,  Rajinder Yo

## 2017-11-04 ENCOUNTER — TELEPHONE (OUTPATIENT)
Dept: PEDIATRICS CLINIC | Facility: CLINIC | Age: 67
End: 2017-11-04

## 2017-11-06 ENCOUNTER — HOSPITAL ENCOUNTER (OUTPATIENT)
Facility: HOSPITAL | Age: 67
Setting detail: OBSERVATION
Discharge: HOME | End: 2017-11-07
Attending: OBSTETRICS & GYNECOLOGY | Admitting: OBSTETRICS & GYNECOLOGY
Payer: COMMERCIAL

## 2017-11-06 ENCOUNTER — SURGERY (OUTPATIENT)
Age: 67
End: 2017-11-06

## 2017-11-06 ENCOUNTER — ANESTHESIA EVENT (OUTPATIENT)
Dept: SURGERY | Facility: HOSPITAL | Age: 67
End: 2017-11-06
Payer: COMMERCIAL

## 2017-11-06 ENCOUNTER — APPOINTMENT (OUTPATIENT)
Dept: WOUND CARE | Facility: HOSPITAL | Age: 67
End: 2017-11-06
Attending: NURSE PRACTITIONER
Payer: COMMERCIAL

## 2017-11-06 ENCOUNTER — ANESTHESIA (OUTPATIENT)
Dept: SURGERY | Facility: HOSPITAL | Age: 67
End: 2017-11-06
Payer: COMMERCIAL

## 2017-11-06 DIAGNOSIS — N81.89 PELVIC RELAXATION: ICD-10-CM

## 2017-11-06 DIAGNOSIS — N81.3 COMPLETE UTERINE PROLAPSE: ICD-10-CM

## 2017-11-06 DIAGNOSIS — N81.11 CYSTOCELE, MIDLINE: ICD-10-CM

## 2017-11-06 DIAGNOSIS — N39.3 STRESS INCONTINENCE: ICD-10-CM

## 2017-11-06 DIAGNOSIS — N81.4 UTERINE PROLAPSE: ICD-10-CM

## 2017-11-06 DIAGNOSIS — N81.2 INCOMPLETE UTEROVAGINAL PROLAPSE: Primary | ICD-10-CM

## 2017-11-06 PROCEDURE — 0UT2FZZ RESECTION OF BILATERAL OVARIES, VIA NATURAL OR ARTIFICIAL OPENING WITH PERCUTANEOUS ENDOSCOPIC ASSISTANCE: ICD-10-PCS | Performed by: OBSTETRICS & GYNECOLOGY

## 2017-11-06 PROCEDURE — 0TSD4ZZ REPOSITION URETHRA, PERCUTANEOUS ENDOSCOPIC APPROACH: ICD-10-PCS | Performed by: OBSTETRICS & GYNECOLOGY

## 2017-11-06 PROCEDURE — 0USG7ZZ REPOSITION VAGINA, VIA NATURAL OR ARTIFICIAL OPENING: ICD-10-PCS | Performed by: OBSTETRICS & GYNECOLOGY

## 2017-11-06 PROCEDURE — 0TJB8ZZ INSPECTION OF BLADDER, VIA NATURAL OR ARTIFICIAL OPENING ENDOSCOPIC: ICD-10-PCS | Performed by: OBSTETRICS & GYNECOLOGY

## 2017-11-06 PROCEDURE — 0W3J4ZZ CONTROL BLEEDING IN PELVIC CAVITY, PERCUTANEOUS ENDOSCOPIC APPROACH: ICD-10-PCS | Performed by: OBSTETRICS & GYNECOLOGY

## 2017-11-06 PROCEDURE — 0JQC0ZZ REPAIR PELVIC REGION SUBCUTANEOUS TISSUE AND FASCIA, OPEN APPROACH: ICD-10-PCS | Performed by: OBSTETRICS & GYNECOLOGY

## 2017-11-06 PROCEDURE — 58262 VAG HYST INCLUDING T/O: CPT | Performed by: OBSTETRICS & GYNECOLOGY

## 2017-11-06 PROCEDURE — 0UT9FZZ RESECTION OF UTERUS, VIA NATURAL OR ARTIFICIAL OPENING WITH PERCUTANEOUS ENDOSCOPIC ASSISTANCE: ICD-10-PCS | Performed by: OBSTETRICS & GYNECOLOGY

## 2017-11-06 PROCEDURE — 0UT7FZZ RESECTION OF BILATERAL FALLOPIAN TUBES, VIA NATURAL OR ARTIFICIAL OPENING WITH PERCUTANEOUS ENDOSCOPIC ASSISTANCE: ICD-10-PCS | Performed by: OBSTETRICS & GYNECOLOGY

## 2017-11-06 DEVICE — CONTINENCE SYSTEM
Type: IMPLANTABLE DEVICE | Site: VAGINA | Status: FUNCTIONAL
Brand: GYNECARE TVT EXACT

## 2017-11-06 RX ORDER — NALOXONE HYDROCHLORIDE 0.4 MG/ML
80 INJECTION, SOLUTION INTRAMUSCULAR; INTRAVENOUS; SUBCUTANEOUS AS NEEDED
Status: DISCONTINUED | OUTPATIENT
Start: 2017-11-06 | End: 2017-11-06 | Stop reason: HOSPADM

## 2017-11-06 RX ORDER — ONDANSETRON 2 MG/ML
4 INJECTION INTRAMUSCULAR; INTRAVENOUS ONCE AS NEEDED
Status: DISCONTINUED | OUTPATIENT
Start: 2017-11-06 | End: 2017-11-06 | Stop reason: HOSPADM

## 2017-11-06 RX ORDER — KETOROLAC TROMETHAMINE 15 MG/ML
15 INJECTION, SOLUTION INTRAMUSCULAR; INTRAVENOUS EVERY 6 HOURS
Status: DISCONTINUED | OUTPATIENT
Start: 2017-11-06 | End: 2017-11-07

## 2017-11-06 RX ORDER — ONDANSETRON 2 MG/ML
INJECTION INTRAMUSCULAR; INTRAVENOUS AS NEEDED
Status: DISCONTINUED | OUTPATIENT
Start: 2017-11-06 | End: 2017-11-06 | Stop reason: SURG

## 2017-11-06 RX ORDER — HYDROMORPHONE HYDROCHLORIDE 1 MG/ML
0.2 INJECTION, SOLUTION INTRAMUSCULAR; INTRAVENOUS; SUBCUTANEOUS EVERY 5 MIN PRN
Status: DISCONTINUED | OUTPATIENT
Start: 2017-11-06 | End: 2017-11-06 | Stop reason: HOSPADM

## 2017-11-06 RX ORDER — MORPHINE SULFATE 10 MG/ML
6 INJECTION, SOLUTION INTRAMUSCULAR; INTRAVENOUS EVERY 10 MIN PRN
Status: DISCONTINUED | OUTPATIENT
Start: 2017-11-06 | End: 2017-11-06 | Stop reason: HOSPADM

## 2017-11-06 RX ORDER — KETOROLAC TROMETHAMINE 30 MG/ML
INJECTION, SOLUTION INTRAMUSCULAR; INTRAVENOUS AS NEEDED
Status: DISCONTINUED | OUTPATIENT
Start: 2017-11-06 | End: 2017-11-06 | Stop reason: SURG

## 2017-11-06 RX ORDER — EPHEDRINE SULFATE 50 MG/ML
INJECTION, SOLUTION INTRAVENOUS AS NEEDED
Status: DISCONTINUED | OUTPATIENT
Start: 2017-11-06 | End: 2017-11-06 | Stop reason: SURG

## 2017-11-06 RX ORDER — HYDROMORPHONE HYDROCHLORIDE 1 MG/ML
0.4 INJECTION, SOLUTION INTRAMUSCULAR; INTRAVENOUS; SUBCUTANEOUS EVERY 5 MIN PRN
Status: DISCONTINUED | OUTPATIENT
Start: 2017-11-06 | End: 2017-11-06 | Stop reason: HOSPADM

## 2017-11-06 RX ORDER — SODIUM CHLORIDE, SODIUM LACTATE, POTASSIUM CHLORIDE, CALCIUM CHLORIDE 600; 310; 30; 20 MG/100ML; MG/100ML; MG/100ML; MG/100ML
INJECTION, SOLUTION INTRAVENOUS CONTINUOUS
Status: DISCONTINUED | OUTPATIENT
Start: 2017-11-06 | End: 2017-11-07

## 2017-11-06 RX ORDER — GLYCOPYRROLATE 0.2 MG/ML
INJECTION INTRAMUSCULAR; INTRAVENOUS AS NEEDED
Status: DISCONTINUED | OUTPATIENT
Start: 2017-11-06 | End: 2017-11-06 | Stop reason: SURG

## 2017-11-06 RX ORDER — HYDROMORPHONE HYDROCHLORIDE 1 MG/ML
0.6 INJECTION, SOLUTION INTRAMUSCULAR; INTRAVENOUS; SUBCUTANEOUS EVERY 5 MIN PRN
Status: DISCONTINUED | OUTPATIENT
Start: 2017-11-06 | End: 2017-11-06 | Stop reason: HOSPADM

## 2017-11-06 RX ORDER — METRONIDAZOLE 500 MG/1
250 TABLET ORAL EVERY 8 HOURS
Status: DISCONTINUED | OUTPATIENT
Start: 2017-11-06 | End: 2017-11-07

## 2017-11-06 RX ORDER — ONDANSETRON 2 MG/ML
4 INJECTION INTRAMUSCULAR; INTRAVENOUS EVERY 8 HOURS PRN
Status: DISCONTINUED | OUTPATIENT
Start: 2017-11-06 | End: 2017-11-07

## 2017-11-06 RX ORDER — ROCURONIUM BROMIDE 10 MG/ML
INJECTION, SOLUTION INTRAVENOUS AS NEEDED
Status: DISCONTINUED | OUTPATIENT
Start: 2017-11-06 | End: 2017-11-06 | Stop reason: SURG

## 2017-11-06 RX ORDER — SODIUM CHLORIDE, SODIUM LACTATE, POTASSIUM CHLORIDE, CALCIUM CHLORIDE 600; 310; 30; 20 MG/100ML; MG/100ML; MG/100ML; MG/100ML
INJECTION, SOLUTION INTRAVENOUS CONTINUOUS
Status: DISCONTINUED | OUTPATIENT
Start: 2017-11-06 | End: 2017-11-06 | Stop reason: HOSPADM

## 2017-11-06 RX ORDER — BUPIVACAINE HYDROCHLORIDE AND EPINEPHRINE 5; 5 MG/ML; UG/ML
INJECTION, SOLUTION PERINEURAL AS NEEDED
Status: DISCONTINUED | OUTPATIENT
Start: 2017-11-06 | End: 2017-11-06 | Stop reason: HOSPADM

## 2017-11-06 RX ORDER — HYDROCODONE BITARTRATE AND ACETAMINOPHEN 5; 325 MG/1; MG/1
1 TABLET ORAL AS NEEDED
Status: DISCONTINUED | OUTPATIENT
Start: 2017-11-06 | End: 2017-11-06 | Stop reason: HOSPADM

## 2017-11-06 RX ORDER — ACETAMINOPHEN 500 MG
1000 TABLET ORAL ONCE
Status: COMPLETED | OUTPATIENT
Start: 2017-11-06 | End: 2017-11-06

## 2017-11-06 RX ORDER — SODIUM CHLORIDE 0.9 % (FLUSH) 0.9 %
10 SYRINGE (ML) INJECTION AS NEEDED
Status: DISCONTINUED | OUTPATIENT
Start: 2017-11-06 | End: 2017-11-07

## 2017-11-06 RX ORDER — MORPHINE SULFATE 2 MG/ML
2 INJECTION, SOLUTION INTRAMUSCULAR; INTRAVENOUS EVERY 10 MIN PRN
Status: DISCONTINUED | OUTPATIENT
Start: 2017-11-06 | End: 2017-11-06 | Stop reason: HOSPADM

## 2017-11-06 RX ORDER — MORPHINE SULFATE 4 MG/ML
4 INJECTION, SOLUTION INTRAMUSCULAR; INTRAVENOUS EVERY 10 MIN PRN
Status: DISCONTINUED | OUTPATIENT
Start: 2017-11-06 | End: 2017-11-06 | Stop reason: HOSPADM

## 2017-11-06 RX ORDER — HYDROCODONE BITARTRATE AND ACETAMINOPHEN 7.5; 325 MG/1; MG/1
1 TABLET ORAL EVERY 6 HOURS PRN
Status: DISCONTINUED | OUTPATIENT
Start: 2017-11-06 | End: 2017-11-07

## 2017-11-06 RX ORDER — HYDROCODONE BITARTRATE AND ACETAMINOPHEN 5; 325 MG/1; MG/1
2 TABLET ORAL AS NEEDED
Status: DISCONTINUED | OUTPATIENT
Start: 2017-11-06 | End: 2017-11-06 | Stop reason: HOSPADM

## 2017-11-06 RX ORDER — NEOSTIGMINE METHYLSULFATE 0.5 MG/ML
INJECTION INTRAVENOUS AS NEEDED
Status: DISCONTINUED | OUTPATIENT
Start: 2017-11-06 | End: 2017-11-06 | Stop reason: SURG

## 2017-11-06 RX ORDER — SODIUM CHLORIDE 9 MG/ML
INJECTION, SOLUTION INTRAVENOUS CONTINUOUS PRN
Status: DISCONTINUED | OUTPATIENT
Start: 2017-11-06 | End: 2017-11-06 | Stop reason: SURG

## 2017-11-06 RX ORDER — FAMOTIDINE 20 MG/1
20 TABLET ORAL ONCE
Status: DISCONTINUED | OUTPATIENT
Start: 2017-11-06 | End: 2017-11-06 | Stop reason: HOSPADM

## 2017-11-06 RX ORDER — HALOPERIDOL 5 MG/ML
0.25 INJECTION INTRAMUSCULAR ONCE AS NEEDED
Status: DISCONTINUED | OUTPATIENT
Start: 2017-11-06 | End: 2017-11-06 | Stop reason: HOSPADM

## 2017-11-06 RX ORDER — MIDAZOLAM HYDROCHLORIDE 1 MG/ML
INJECTION INTRAMUSCULAR; INTRAVENOUS AS NEEDED
Status: DISCONTINUED | OUTPATIENT
Start: 2017-11-06 | End: 2017-11-06 | Stop reason: SURG

## 2017-11-06 RX ORDER — MESALAMINE 400 MG/1
800 CAPSULE, DELAYED RELEASE ORAL
Status: DISCONTINUED | OUTPATIENT
Start: 2017-11-06 | End: 2017-11-07

## 2017-11-06 RX ORDER — METOCLOPRAMIDE 10 MG/1
10 TABLET ORAL ONCE
Status: DISCONTINUED | OUTPATIENT
Start: 2017-11-06 | End: 2017-11-06 | Stop reason: HOSPADM

## 2017-11-06 RX ORDER — LIDOCAINE HYDROCHLORIDE 10 MG/ML
INJECTION, SOLUTION EPIDURAL; INFILTRATION; INTRACAUDAL; PERINEURAL AS NEEDED
Status: DISCONTINUED | OUTPATIENT
Start: 2017-11-06 | End: 2017-11-06 | Stop reason: SURG

## 2017-11-06 RX ORDER — DEXTROSE AND SODIUM CHLORIDE 5; .45 G/100ML; G/100ML
INJECTION, SOLUTION INTRAVENOUS CONTINUOUS
Status: DISCONTINUED | OUTPATIENT
Start: 2017-11-06 | End: 2017-11-07

## 2017-11-06 RX ADMIN — ROCURONIUM BROMIDE 5 MG: 10 INJECTION, SOLUTION INTRAVENOUS at 08:59:00

## 2017-11-06 RX ADMIN — MIDAZOLAM HYDROCHLORIDE 2 MG: 1 INJECTION INTRAMUSCULAR; INTRAVENOUS at 08:09:00

## 2017-11-06 RX ADMIN — KETOROLAC TROMETHAMINE 15 MG: 30 INJECTION, SOLUTION INTRAMUSCULAR; INTRAVENOUS at 10:06:00

## 2017-11-06 RX ADMIN — ROCURONIUM BROMIDE 5 MG: 10 INJECTION, SOLUTION INTRAVENOUS at 09:47:00

## 2017-11-06 RX ADMIN — NEOSTIGMINE METHYLSULFATE 3.3 MG: 0.5 INJECTION INTRAVENOUS at 10:04:00

## 2017-11-06 RX ADMIN — LIDOCAINE HYDROCHLORIDE 50 MG: 10 INJECTION, SOLUTION EPIDURAL; INFILTRATION; INTRACAUDAL; PERINEURAL at 08:15:00

## 2017-11-06 RX ADMIN — GLYCOPYRROLATE 0.6 MG: 0.2 INJECTION INTRAMUSCULAR; INTRAVENOUS at 10:04:00

## 2017-11-06 RX ADMIN — ROCURONIUM BROMIDE 35 MG: 10 INJECTION, SOLUTION INTRAVENOUS at 08:16:00

## 2017-11-06 RX ADMIN — SODIUM CHLORIDE: 9 INJECTION, SOLUTION INTRAVENOUS at 10:08:00

## 2017-11-06 RX ADMIN — SODIUM CHLORIDE: 9 INJECTION, SOLUTION INTRAVENOUS at 08:52:00

## 2017-11-06 RX ADMIN — ONDANSETRON 4 MG: 2 INJECTION INTRAMUSCULAR; INTRAVENOUS at 10:06:00

## 2017-11-06 RX ADMIN — ROCURONIUM BROMIDE 5 MG: 10 INJECTION, SOLUTION INTRAVENOUS at 09:40:00

## 2017-11-06 RX ADMIN — SODIUM CHLORIDE, SODIUM LACTATE, POTASSIUM CHLORIDE, CALCIUM CHLORIDE: 600; 310; 30; 20 INJECTION, SOLUTION INTRAVENOUS at 08:50:00

## 2017-11-06 RX ADMIN — ROCURONIUM BROMIDE 5 MG: 10 INJECTION, SOLUTION INTRAVENOUS at 09:20:00

## 2017-11-06 RX ADMIN — EPHEDRINE SULFATE 5 MG: 50 INJECTION, SOLUTION INTRAVENOUS at 09:03:00

## 2017-11-06 RX ADMIN — SODIUM CHLORIDE, SODIUM LACTATE, POTASSIUM CHLORIDE, CALCIUM CHLORIDE: 600; 310; 30; 20 INJECTION, SOLUTION INTRAVENOUS at 08:09:00

## 2017-11-06 RX ADMIN — GLYCOPYRROLATE 0.1 MG: 0.2 INJECTION INTRAMUSCULAR; INTRAVENOUS at 08:09:00

## 2017-11-06 NOTE — TELEPHONE ENCOUNTER
Disability form faxed to [employer] claims. FMLA form faxed to [facility]. Originals mailed to patient. Complete.

## 2017-11-06 NOTE — ANESTHESIA POSTPROCEDURE EVALUATION
Patient: Victor Hugo Portillo    Procedure Summary     Date:  11/06/17 Room / Location:  [address] [place] [address] MAIN OR 08 / [address] [place] [address] OR    Anesthesia Start:  0809 Anesthesia Stop:  9719    Procedures:       TOTAL LAPAROSCOPIC HYSTERECTOMY (N/A Perineum)      PUBO VAGINAL SLING

## 2017-11-06 NOTE — OPERATIVE REPORT
Surgeon(s) and Role:  Panel 1:     * Sujit Rosa MD - Assisting Surgeon     * Tl Weiss MD - Primary    Panel 2:     Corazon Torres MD - Primary    Assistants: Dr Kelly Odonnell    Anesthesia: General endotracheal anesthesia    ASA Class: per anesthesia from the urethral meatus extending approximately 2 cm proximally in the midline. The edges of the incision were held with Louie Breeding' and metzenbaum scissors were used to create periurethral tunnels bilaterally in the vaginal submucosa.  Sling trocars were then

## 2017-11-06 NOTE — PLAN OF CARE
DISCHARGE PLANNING    • Discharge to home or other facility with appropriate resources Progressing        GENITOURINARY - ADULT    • Absence of urinary retention Progressing        Patient Centered Care    • Patient preferences are identified and integrate

## 2017-11-06 NOTE — H&P
[address] [facility] Patient Status:  Hospital Outpatient Surgery    1950 MRN [id number]   Location [facility] PRE OP RECOVERY Attending Noe Figueora MD   Hosp Day # 0 PCP Becky Garland, 3 Term     M NORMAL SPONT   JORGE A   2 Term     M NORMAL SPONT   JORGE A   1 AB                    Past GYN History:   postmenopausal    Social History:     Smoking status: Former Smoker     Types: Cigarettes    Quit date: 7/21/2004    Smokeless tobacco: Former results found. Assessment/Plan:     Complete uterovaginal prolapse  For TLH/BSO from my portion -- see Dr. Sobia Akhtar note re: urine incontinence surgery. Stress incontinence          All of the findings and plan were discussed with the patient.   Bright Martino

## 2017-11-06 NOTE — OPERATIVE REPORT
[facility]    PATIENT'S NAME: Shabnam Welch   ATTENDING PHYSICIAN: Gudelia Bello MD   OPERATING PHYSICIAN: Gudelia Bello MD   PATIENT ACCOUNT#:   [id number]    LOCATION:  [facility] PACU [facility] 10  MEDICAL RECORD #:   [id number]       DATE Marirahulus Wilmer we were able to put the weighted speculum into the posterior cul-de-sac. This allowed us to get bites of the uterine artery incorporating the peritoneum without difficulty. Once we reached the fundus, we had already entered the anterior cul-de-sac.   Then using 4-0 Vicryl in a subcuticular stitch. Dermabond was placed on top of all the skin sites. This was the end of my portion of the surgery. All instrument, sponge, lap, and needle counts were correct x2.       Dictated By Omid Andrew MD  d: 11/06/20

## 2017-11-06 NOTE — ANESTHESIA PREPROCEDURE EVALUATION
Anesthesia PreOp Note    HPI:     Bernestine Moritz is a 78year old female who presents for preoperative consultation requested by: Shamika Cramer MD    Date of Surgery: 11/6/2017    Procedure(s):  TOTAL LAPAROSCOPIC HYSTERECTOMY  PUBO VAGINAL SLING  ANTE time   DELZICOL 400 MG Oral Capsule Delayed Release TAKE 2 CAPSULES BY MOUTH THREE TIMES DAILY BEFORE MEALS Disp: 180 capsule Rfl: 11 11/5/2017 at 2200       Current Facility-Administered Medications Ordered in Epic:  lactated ringers infusion  Intravenous HCT 39.6 10/21/2017   MCV 92.8 10/21/2017   MCH 31.2 10/21/2017   MCHC 33.7 10/21/2017   RDW 14.5 10/21/2017    10/21/2017   MPV 8.8 10/21/2017       Lab Results  Component Value Date    10/21/2017   K 4.2 10/21/2017    10/21/2017   CO management as planned.   LUDWIN WALTON  11/6/2017 6:54 AM

## 2017-11-06 NOTE — BRIEF OP NOTE
Brief Operative Note    Patient Name: Bernestine Moritz    Preoperative Diagnosis: Pelvic relaxation, uterine prolapse, stress incontinence, cystocele - midline, rectocele     Postoperative Diagnosis: same    Primary Surgeon: Omid Andrew / Dr. Madelaine Saucedo

## 2017-11-07 VITALS
WEIGHT: 144.81 LBS | DIASTOLIC BLOOD PRESSURE: 45 MMHG | BODY MASS INDEX: 21.95 KG/M2 | HEART RATE: 54 BPM | TEMPERATURE: 98 F | OXYGEN SATURATION: 95 % | SYSTOLIC BLOOD PRESSURE: 87 MMHG | RESPIRATION RATE: 18 BRPM | HEIGHT: 68 IN

## 2017-11-07 RX ORDER — HYDROCODONE BITARTRATE AND ACETAMINOPHEN 5; 325 MG/1; MG/1
1 TABLET ORAL EVERY 6 HOURS PRN
Qty: 30 TABLET | Refills: 0 | Status: SHIPPED | OUTPATIENT
Start: 2017-11-07 | End: 2017-12-08

## 2017-11-07 NOTE — PROGRESS NOTES
[facility] HOSP - [facility]    OB/GYNE Post-OP Progress Note      Iris Fletcher Patient Status:  Observation    1950 MRN [id number]   Location [facility] 4W/SW/SE Attending Tarah Polanco MD   Hosp Day # 0 PCP Carey Pratt MD     Date:

## 2017-11-07 NOTE — PLAN OF CARE
DISCHARGE PLANNING    • Discharge to home or other facility with appropriate resources Completed        GENITOURINARY - ADULT    • Absence of urinary retention Completed        Patient Centered Care    • Patient preferences are identified and integrated in

## 2017-11-08 ENCOUNTER — TELEPHONE (OUTPATIENT)
Dept: INTERNAL MEDICINE UNIT | Facility: HOSPITAL | Age: 67
End: 2017-11-08

## 2017-11-08 ENCOUNTER — PATIENT OUTREACH (OUTPATIENT)
Dept: FAMILY MEDICINE CLINIC | Facility: CLINIC | Age: 67
End: 2017-11-08

## 2017-11-08 ENCOUNTER — TELEPHONE (OUTPATIENT)
Dept: GASTROENTEROLOGY | Facility: CLINIC | Age: 67
End: 2017-11-08

## 2017-11-08 ENCOUNTER — TELEPHONE (OUTPATIENT)
Dept: OBGYN CLINIC | Facility: CLINIC | Age: 67
End: 2017-11-08

## 2017-11-08 DIAGNOSIS — K51.011 ULCERATIVE CHRONIC PANCOLITIS WITH RECTAL BLEEDING (HCC): Primary | ICD-10-CM

## 2017-11-08 RX ORDER — MAGNESIUM OXIDE 400 MG (241.3 MG MAGNESIUM) TABLET
800 TABLET DAILY
COMMUNITY
End: 2017-11-15 | Stop reason: DRUGHIGH

## 2017-11-08 NOTE — PAYOR COMM NOTE
--------------  ADMISSION REVIEW     Payor: Janny Lozano 150 PPO  Subscriber #:  [id number]  Authorization Number: [id number]    Admit date: N/A  Admit time: N/A         Gyne History & Physical  Date of Admission:  11/6/2017    HPI:   Reason for Admission:  Complete Past GYN History:   postmenopausal      Physical Exam:     Temp:  [97.6 °F (36.4 °C)] 97.6 °F (36.4 °C)  Pulse:  [69] 69  Resp:  [16] 16  BP: (106)/(55) 106/55  No intake or output data in the 24 hours ending 11/06/17 0715      Constitutional Total vaginal hysterectomy, bilateral salpingo-oophorectomy, laparoscopic cauterization of left infundibulopelvic ligament, vaginal vault suspension, posterior repair, mid urethral sling, and cystoscopy.

## 2017-11-08 NOTE — TELEPHONE ENCOUNTER
Dr Zaragoza Numbers:    Pt had her hysterectomy on Monday. You prescribed her Flagyl for while she was taking a broad spectrum antibiotic. Since she is off the broad spectrum antibiotic she is wondering if she can stop the Flagyl.     Also she sent a email, \"pt advise

## 2017-11-08 NOTE — TELEPHONE ENCOUNTER
Confirm w/ Dr. Sofi Daly that okay to stop flagyl. Okay by me to stop but make sure w/ Dr. Sofi Daly. She needs to go to Dr. Jneelle Romero office for management of light.

## 2017-11-08 NOTE — TELEPHONE ENCOUNTER
If not on narcotic, may drive.  Dr. Bella Marley does have [address] office but I do not know when she is there --

## 2017-11-08 NOTE — PROGRESS NOTES
Initial Post Discharge Follow Up   Discharge Date: 11/7/17  Contact Date: 11/8/2017    Consent Verification:  Assessment Completed With: Patient  HIPAA Verified? Yes    1.  Tell me why you were in the hospital?   \" I had a complete hysterectomy and cherri 8. Which issues keep you from taking your medicine as prescribed? No issues keep me from taking my medicine    9. Does the doctor who prescribed the medicine know about the side effects you are having?  Yes I left a message and I will call again to D her abd from where the one ovary had to be removed and said it is glued, but it is intact and there is no redness, no swelling, minimal pain ( using Motrin), ne fever, no chills. She said she hasn't had a BM since Sunday night when she did bowel prep.  She permission. She stated understanding. Transferred to Dr. Vann Meth office. Referral/Contact:  n/a    n/a    [x]  Discharge Summary, medications and orders reviewed.

## 2017-11-08 NOTE — TELEPHONE ENCOUNTER
Pt had surgery with [facility] on Monday and has several questions. Pt was sent home with a light and was told she has 3 options to get it removed. She can either go to [facility] on Friday, [place] on Monday, or [place] on Tuesday.  Pt would really like to hav

## 2017-11-08 NOTE — TELEPHONE ENCOUNTER
Pt informed of [facility] recs below. Pt upset that she has to drive farther then [place] to have her light catheter removed.  Pt stated \"I don't understand why I had my light taken out at [place] yesterday and now I cant come back to [place] to have it remov

## 2017-11-08 NOTE — TELEPHONE ENCOUNTER
Pt would like to speak to a nurse, has questions regarding the after care of the hysterectomy. Please advise.

## 2017-11-09 ENCOUNTER — TELEPHONE (OUTPATIENT)
Dept: OBGYN CLINIC | Facility: CLINIC | Age: 67
End: 2017-11-09

## 2017-11-09 NOTE — TELEPHONE ENCOUNTER
Kian Davidson from [facility] was calling to verify that pt had surgery done on 11/6 and what exactly did he have done. Please advise.

## 2017-11-09 NOTE — TELEPHONE ENCOUNTER
KIRSTEN NOTIFIED [facility] DID TLH-BSO ON 11-6-17 AND ADVISED TO CALL DR. WILKINSON'S OFFICE FOR HER NOTES.

## 2017-11-09 NOTE — TELEPHONE ENCOUNTER
Dr Amanda Weaver,    I just spoke to Estuardo over the phone. She wanted me to let you know she is experiencing vision problems as well as the hair loss.  She does have an appt with an ophthalmologist. She is concerned because she believes it's the Remicade that is

## 2017-11-09 NOTE — TELEPHONE ENCOUNTER
Yes she can stop the Flagyl as long as he has taken it one day longer than the broad-spectrum antibiotic.

## 2017-11-10 RX ORDER — CYANOCOBALAMIN (VITAMIN B-12) 2500 MCG
1 TABLET, SUBLINGUAL SUBLINGUAL DAILY
Qty: 90 TABLET | Refills: 4 | Status: SHIPPED | OUTPATIENT
Start: 2017-11-10

## 2017-11-10 RX ORDER — FOLIC ACID 1 MG/1
1 TABLET ORAL DAILY
Qty: 90 TABLET | Refills: 4 | Status: SHIPPED | OUTPATIENT
Start: 2017-11-10

## 2017-11-10 NOTE — TELEPHONE ENCOUNTER
I faxed all the pertinent clinicals to Dr Zoraida Villalba office [phone]. I spoke with Kit Narayan to let her know the clinicals were faxed over to [facility]. She has the phone number to Dr Lily Collins already.

## 2017-11-10 NOTE — TELEPHONE ENCOUNTER
I have discussed with her patient major concerns. She has no abdominal complaints currently. No diarrhea or abdominal pain. Mainly she is worried about hair loss as clumps of hair falling out.   I have advised that she continue the Remicade as it is work

## 2017-11-13 ENCOUNTER — APPOINTMENT (OUTPATIENT)
Dept: WOUND CARE | Facility: HOSPITAL | Age: 67
End: 2017-11-13
Attending: NURSE PRACTITIONER
Payer: COMMERCIAL

## 2017-11-14 ENCOUNTER — PATIENT MESSAGE (OUTPATIENT)
Dept: GASTROENTEROLOGY | Facility: CLINIC | Age: 67
End: 2017-11-14

## 2017-11-15 ENCOUNTER — OFFICE VISIT (OUTPATIENT)
Dept: INTERNAL MEDICINE CLINIC | Facility: CLINIC | Age: 67
End: 2017-11-15

## 2017-11-15 VITALS
DIASTOLIC BLOOD PRESSURE: 61 MMHG | WEIGHT: 145 LBS | BODY MASS INDEX: 21.98 KG/M2 | SYSTOLIC BLOOD PRESSURE: 100 MMHG | RESPIRATION RATE: 18 BRPM | HEART RATE: 72 BPM | HEIGHT: 68 IN

## 2017-11-15 DIAGNOSIS — G58.9 COMPRESSION NEUROPATHY: ICD-10-CM

## 2017-11-15 DIAGNOSIS — K51.00 ULCERATIVE PANCOLITIS WITHOUT COMPLICATION (HCC): ICD-10-CM

## 2017-11-15 DIAGNOSIS — Z23 NEED FOR VACCINATION: ICD-10-CM

## 2017-11-15 DIAGNOSIS — M21.612 BILATERAL BUNIONS: ICD-10-CM

## 2017-11-15 DIAGNOSIS — M21.611 BILATERAL BUNIONS: ICD-10-CM

## 2017-11-15 DIAGNOSIS — Z90.710 HISTORY OF TOTAL VAGINAL HYSTERECTOMY (TVH): Primary | ICD-10-CM

## 2017-11-15 PROBLEM — D50.0 IRON DEFICIENCY ANEMIA DUE TO CHRONIC BLOOD LOSS: Status: RESOLVED | Noted: 2017-09-21 | Resolved: 2017-11-15

## 2017-11-15 PROBLEM — N81.2 INCOMPLETE UTEROVAGINAL PROLAPSE: Status: RESOLVED | Noted: 2017-10-30 | Resolved: 2017-11-15

## 2017-11-15 PROBLEM — S81.802A LEG WOUND, LEFT: Status: RESOLVED | Noted: 2017-06-22 | Resolved: 2017-11-15

## 2017-11-15 PROBLEM — N81.3 COMPLETE UTERINE PROLAPSE: Status: RESOLVED | Noted: 2017-10-23 | Resolved: 2017-11-15

## 2017-11-15 PROCEDURE — 90670 PCV13 VACCINE IM: CPT | Performed by: INTERNAL MEDICINE

## 2017-11-15 PROCEDURE — 90471 IMMUNIZATION ADMIN: CPT | Performed by: INTERNAL MEDICINE

## 2017-11-15 PROCEDURE — 99495 TRANSJ CARE MGMT MOD F2F 14D: CPT | Performed by: INTERNAL MEDICINE

## 2017-11-15 NOTE — ASSESSMENT & PLAN NOTE
Toes 1-4 have numbness and tingling which improves with straightening of the bunion. Will refer to podiatry for further evaluation and treatment. Pt is hoping to avoid surgery.

## 2017-11-15 NOTE — PROGRESS NOTES
HPI:    Ijeoma Dahl is a 78year old female here today for hospital follow up.    She was discharged from Inpatient hospital, [facility]  to Home   Admission Date: 11/6/17   Discharge Date: 11/7/17  Hospital Discharge Diagnosis:s/p TLH,BSO,sli This is a chronic problem. The current episode started more than 1 month ago. The problem occurs constantly. The problem has been waxing and waning. Associated symptoms include arthralgias and numbness.  Pertinent negatives include no abdominal pain or coug She  reports that she quit smoking about 13 years ago. Her smoking use included Cigarettes. She has quit using smokeless tobacco. She reports that she drinks alcohol. She reports that she does not use drugs.      ROS:   Review of Systems   Respiratory: King Skin Integrity: Negative for ulcer, blister, skin breakdown or erythema. Neurological: She is alert and oriented to person, place, and time.      ASSESSMENT/ PLAN:     Problem List Items Addressed This Visit     History of total vaginal hysterectomy (TVH)

## 2017-11-15 NOTE — ASSESSMENT & PLAN NOTE
Reviewed hospital notes. Pt is doing well. Wounds are healing. She will f/u with Dr. Arnel Felix and Dr. Adelaide Mijares.

## 2017-11-17 NOTE — TELEPHONE ENCOUNTER
From: Telma Alas  To: Raymond Shoemaker MD  Sent: 11/14/2017 6:34 PM CST  Subject: Other    I scheduled an appt with Dr Rafia Gant at his very earliest availability on Dec 19th - but this is still after my next remicade treatment of Dec

## 2017-11-18 NOTE — TELEPHONE ENCOUNTER
Please explain to patient that I have referred her to Dr. Lily Collins for his expertise in treatment for IBD, and possible discuss alternatives to current treatment with Remicaid. She should proceed with her next Remicaid treatment.

## 2017-11-21 ENCOUNTER — TELEPHONE (OUTPATIENT)
Dept: OBGYN CLINIC | Facility: CLINIC | Age: 67
End: 2017-11-21

## 2017-11-21 NOTE — TELEPHONE ENCOUNTER
BCBS denial letter dated 11/9/17 received. Placed on [facility] desk for review.

## 2017-11-22 NOTE — TELEPHONE ENCOUNTER
Please call patient and read again what I stated previously on the telephone communication 11/18 ( in notes):     \"Please explain to patient that I have referred her to Dr. Bushra Avitia for his expertise in treatment for IBD, and possible discuss alternatives to

## 2017-11-27 ENCOUNTER — TELEPHONE (OUTPATIENT)
Dept: OBGYN CLINIC | Facility: CLINIC | Age: 67
End: 2017-11-27

## 2017-11-27 NOTE — TELEPHONE ENCOUNTER
Per pt she needs the office to contact her [address] [facility] at [phone] to discuss the case that pt was release from the hospital on 11/07/2017, because the ins records indicate she was discharge on 11/09/2017, request id # [phone]

## 2017-12-08 ENCOUNTER — TELEPHONE (OUTPATIENT)
Dept: OBGYN CLINIC | Facility: CLINIC | Age: 67
End: 2017-12-08

## 2017-12-08 ENCOUNTER — OFFICE VISIT (OUTPATIENT)
Dept: OBGYN CLINIC | Facility: CLINIC | Age: 67
End: 2017-12-08

## 2017-12-08 VITALS
DIASTOLIC BLOOD PRESSURE: 57 MMHG | WEIGHT: 144 LBS | SYSTOLIC BLOOD PRESSURE: 95 MMHG | BODY MASS INDEX: 22 KG/M2 | HEART RATE: 63 BPM

## 2017-12-08 DIAGNOSIS — Z09 POSTOP CHECK: Primary | ICD-10-CM

## 2017-12-08 PROCEDURE — 99024 POSTOP FOLLOW-UP VISIT: CPT | Performed by: OBSTETRICS & GYNECOLOGY

## 2017-12-08 NOTE — TELEPHONE ENCOUNTER
[facility] requested that I complete the appeal forms for pt's 11/6/17 surgery. Forms completed, op report printed, and everything faxed to [facility].

## 2017-12-08 NOTE — PROGRESS NOTES
Clorinda Parent is a 78year old female  No LMP recorded.  Patient is postmenopausal. Patient presents with:  Gyn Problem: POST OP  .     OBSTETRICS HISTORY:  Obstetric History     T2    L2    SAB0  TAB0  Ectopic0  Multiple0  Live Births Disp: , Rfl:   •  Calcium Carbonate-Vitamin D (CALCIUM + D OR), Take by mouth daily. , Disp: , Rfl:   •  DELZICOL 400 MG Oral Capsule Delayed Release, TAKE 2 CAPSULES BY MOUTH THREE TIMES DAILY BEFORE MEALS, Disp: 180 capsule, Rfl: 11    ALLERGIES:    Adhes all orders for this visit:    Postop check          No prescriptions requested or ordered in this encounter    Reviewed surgerical path. Release for work signed.  F/u March annual  No exercise, heavy lifting x 2 months

## 2017-12-11 ENCOUNTER — APPOINTMENT (OUTPATIENT)
Dept: WOUND CARE | Facility: HOSPITAL | Age: 67
End: 2017-12-11
Attending: NURSE PRACTITIONER
Payer: COMMERCIAL

## 2017-12-11 ENCOUNTER — APPOINTMENT (OUTPATIENT)
Dept: HEMATOLOGY/ONCOLOGY | Facility: HOSPITAL | Age: 67
End: 2017-12-11
Attending: INTERNAL MEDICINE
Payer: COMMERCIAL

## 2017-12-12 ENCOUNTER — APPOINTMENT (OUTPATIENT)
Dept: HEMATOLOGY/ONCOLOGY | Facility: HOSPITAL | Age: 67
End: 2017-12-12
Attending: INTERNAL MEDICINE
Payer: COMMERCIAL

## 2017-12-13 ENCOUNTER — OFFICE VISIT (OUTPATIENT)
Dept: HEMATOLOGY/ONCOLOGY | Facility: HOSPITAL | Age: 67
End: 2017-12-13
Attending: INTERNAL MEDICINE
Payer: COMMERCIAL

## 2017-12-13 VITALS
SYSTOLIC BLOOD PRESSURE: 104 MMHG | WEIGHT: 148 LBS | DIASTOLIC BLOOD PRESSURE: 55 MMHG | TEMPERATURE: 98 F | RESPIRATION RATE: 16 BRPM | BODY MASS INDEX: 23 KG/M2 | HEART RATE: 62 BPM

## 2017-12-13 DIAGNOSIS — K51.011 ULCERATIVE CHRONIC PANCOLITIS WITH RECTAL BLEEDING (HCC): Primary | ICD-10-CM

## 2017-12-13 PROCEDURE — 96415 CHEMO IV INFUSION ADDL HR: CPT

## 2017-12-13 PROCEDURE — 96413 CHEMO IV INFUSION 1 HR: CPT

## 2017-12-13 RX ORDER — ACETAMINOPHEN 325 MG/1
650 TABLET ORAL ONCE
Status: COMPLETED | OUTPATIENT
Start: 2017-12-13 | End: 2017-12-13

## 2017-12-13 RX ORDER — ACETAMINOPHEN 325 MG/1
650 TABLET ORAL ONCE
Status: CANCELLED | OUTPATIENT
Start: 2017-12-13

## 2017-12-13 RX ORDER — SODIUM CHLORIDE 9 MG/ML
INJECTION, SOLUTION INTRAVENOUS
Status: DISCONTINUED
Start: 2017-12-13 | End: 2017-12-13

## 2017-12-13 RX ORDER — ACETAMINOPHEN 160 MG/5ML
SOLUTION ORAL
Status: DISCONTINUED
Start: 2017-12-13 | End: 2017-12-13

## 2017-12-13 RX ADMIN — ACETAMINOPHEN 650 MG: 325 TABLET ORAL at 15:34:00

## 2017-12-13 NOTE — PROGRESS NOTES
Madelyn to infusion today for week 8 Remicade. She arrived ambulatory and states her ulcerative colitis is controlled , no flare ups.  Her bowels are normal, denies any abdominal pain she ahd Hysterectomy and bladder surgery in November and is healing well

## 2017-12-29 ENCOUNTER — HOSPITAL ENCOUNTER (OUTPATIENT)
Facility: HOSPITAL | Age: 67
Setting detail: HOSPITAL OUTPATIENT SURGERY
Discharge: HOME | End: 2017-12-29
Attending: INTERNAL MEDICINE | Admitting: INTERNAL MEDICINE
Payer: COMMERCIAL

## 2017-12-29 ENCOUNTER — SURGERY (OUTPATIENT)
Age: 67
End: 2017-12-29

## 2017-12-29 DIAGNOSIS — K51.919 ULCERATIVE COLITIS WITH COMPLICATION, UNSPECIFIED LOCATION (HCC): ICD-10-CM

## 2017-12-29 PROBLEM — K51.019: Status: ACTIVE | Noted: 2017-06-13

## 2017-12-29 PROBLEM — K64.8 INTERNAL HEMORRHOIDS: Status: ACTIVE | Noted: 2017-12-29

## 2017-12-29 PROCEDURE — 0DBK8ZX EXCISION OF ASCENDING COLON, VIA NATURAL OR ARTIFICIAL OPENING ENDOSCOPIC, DIAGNOSTIC: ICD-10-PCS | Performed by: INTERNAL MEDICINE

## 2017-12-29 PROCEDURE — 0DBH8ZX EXCISION OF CECUM, VIA NATURAL OR ARTIFICIAL OPENING ENDOSCOPIC, DIAGNOSTIC: ICD-10-PCS | Performed by: INTERNAL MEDICINE

## 2017-12-29 PROCEDURE — 0DBN8ZX EXCISION OF SIGMOID COLON, VIA NATURAL OR ARTIFICIAL OPENING ENDOSCOPIC, DIAGNOSTIC: ICD-10-PCS | Performed by: INTERNAL MEDICINE

## 2017-12-29 PROCEDURE — 0DBL8ZX EXCISION OF TRANSVERSE COLON, VIA NATURAL OR ARTIFICIAL OPENING ENDOSCOPIC, DIAGNOSTIC: ICD-10-PCS | Performed by: INTERNAL MEDICINE

## 2017-12-29 PROCEDURE — 0DBM8ZX EXCISION OF DESCENDING COLON, VIA NATURAL OR ARTIFICIAL OPENING ENDOSCOPIC, DIAGNOSTIC: ICD-10-PCS | Performed by: INTERNAL MEDICINE

## 2017-12-29 PROCEDURE — 45380 COLONOSCOPY AND BIOPSY: CPT | Performed by: INTERNAL MEDICINE

## 2017-12-29 PROCEDURE — 0DBP8ZX EXCISION OF RECTUM, VIA NATURAL OR ARTIFICIAL OPENING ENDOSCOPIC, DIAGNOSTIC: ICD-10-PCS | Performed by: INTERNAL MEDICINE

## 2017-12-29 PROCEDURE — G0500 MOD SEDAT ENDO SERVICE >5YRS: HCPCS | Performed by: INTERNAL MEDICINE

## 2017-12-29 RX ORDER — SODIUM CHLORIDE, SODIUM LACTATE, POTASSIUM CHLORIDE, CALCIUM CHLORIDE 600; 310; 30; 20 MG/100ML; MG/100ML; MG/100ML; MG/100ML
INJECTION, SOLUTION INTRAVENOUS CONTINUOUS
Status: DISCONTINUED | OUTPATIENT
Start: 2017-12-29 | End: 2017-12-29

## 2017-12-29 RX ORDER — MIDAZOLAM HYDROCHLORIDE 1 MG/ML
1 INJECTION INTRAMUSCULAR; INTRAVENOUS EVERY 5 MIN PRN
Status: DISCONTINUED | OUTPATIENT
Start: 2017-12-29 | End: 2017-12-29

## 2017-12-29 RX ORDER — SODIUM CHLORIDE 0.9 % (FLUSH) 0.9 %
10 SYRINGE (ML) INJECTION AS NEEDED
Status: DISCONTINUED | OUTPATIENT
Start: 2017-12-29 | End: 2017-12-29

## 2017-12-29 RX ORDER — MIDAZOLAM HYDROCHLORIDE 1 MG/ML
INJECTION INTRAMUSCULAR; INTRAVENOUS
Status: DISCONTINUED | OUTPATIENT
Start: 2017-12-29 | End: 2017-12-29

## 2017-12-29 RX ORDER — CURCUMIN 100 %
5 POWDER (GRAM) MISCELLANEOUS DAILY
COMMUNITY
End: 2018-03-19

## 2017-12-29 NOTE — OPERATIVE REPORT
[facility]    PATIENT'S NAME: Medina Magui   ATTENDING PHYSICIAN: Jeronimo Raman MD   OPERATING PHYSICIAN: Jeronimo Raman MD   PATIENT ACCOUNT#:   [id number]    LOCATION:  [facility] ROOM 8 [address] patchy areas of erythema, hyperemia and increased capillary formation, more predominant in the left hemicolon than the right hemicolon, but present at least from the proximal ascending colon to the rectum.   There were numerous pseudopolyps throughout the c

## 2017-12-29 NOTE — BRIEF OP NOTE
Pre-Operative Diagnosis: Ulcerative colitis with complication, unspecified location [facility])     Post-Operative Diagnosis: Ulcerative colitis with pseudopolyp formation, status  post random colon biopsies; internal hemorrhoids     Procedure Performed:   Michelle

## 2017-12-29 NOTE — H&P
History & Physical Examination    Patient Name: Iris Fletcher  MRN: [id number]  [facility]: [id number]  YOB: 1950    Diagnosis: Ulcerative colitis      Prescriptions Prior to Admission:  Turmeric, Curcuma Longa, (CURCUMIN) Does not apply Powder 5 Pacemaker Father    • Heart Disease Father    • Melanoma Father    • Diabetes Mother      DM   • Cancer Mother      leukemia   • Cancer Maternal Grandmother      lung   • Cancer Paternal Grandmother      stomach   • Cancer Sister      breast   • Breast Can

## 2018-01-02 VITALS
RESPIRATION RATE: 14 BRPM | OXYGEN SATURATION: 100 % | BODY MASS INDEX: 21.98 KG/M2 | DIASTOLIC BLOOD PRESSURE: 66 MMHG | WEIGHT: 145 LBS | SYSTOLIC BLOOD PRESSURE: 103 MMHG | HEIGHT: 68 IN | HEART RATE: 65 BPM

## 2018-01-08 ENCOUNTER — TELEPHONE (OUTPATIENT)
Dept: GASTROENTEROLOGY | Facility: CLINIC | Age: 68
End: 2018-01-08

## 2018-01-08 NOTE — TELEPHONE ENCOUNTER
Please send letter and recall colonoscopy for 2 years. Please send a copy of the pathology report and the colonoscopy report to Dr. Anamaria Mitchell at the [facility]. Then you may close the encounter.

## 2018-01-11 ENCOUNTER — OFFICE VISIT (OUTPATIENT)
Dept: PODIATRY CLINIC | Facility: CLINIC | Age: 68
End: 2018-01-11

## 2018-01-11 DIAGNOSIS — M20.41 HAMMER TOE OF RIGHT FOOT: ICD-10-CM

## 2018-01-11 DIAGNOSIS — M21.612 BILATERAL BUNIONS: ICD-10-CM

## 2018-01-11 DIAGNOSIS — M79.672 PAIN IN BOTH FEET: Primary | ICD-10-CM

## 2018-01-11 DIAGNOSIS — M21.611 BILATERAL BUNIONS: ICD-10-CM

## 2018-01-11 DIAGNOSIS — M79.671 PAIN IN BOTH FEET: Primary | ICD-10-CM

## 2018-01-11 PROCEDURE — 99203 OFFICE O/P NEW LOW 30 MIN: CPT | Performed by: PODIATRIST

## 2018-01-11 PROCEDURE — 99212 OFFICE O/P EST SF 10 MIN: CPT | Performed by: PODIATRIST

## 2018-01-17 ENCOUNTER — TELEPHONE (OUTPATIENT)
Dept: PODIATRY CLINIC | Facility: CLINIC | Age: 68
End: 2018-01-17

## 2018-01-17 NOTE — TELEPHONE ENCOUNTER
Dr. Nicole Valdez, I am preparing to call pt to schedule surgery.  Which foot do you anticipate doing surgery on first?

## 2018-01-19 NOTE — TELEPHONE ENCOUNTER
-- If pt calls back please inform her that I will call her back Wednesday of next week when I have office hours to do so. Thank you!

## 2018-01-24 NOTE — TELEPHONE ENCOUNTER
Dr Prabhu Rasmussen, pt is scheduled for surgery with you on 03/14/18.  She has the following concerns:  - Do you recommend a pre op physical by her PCP, Dr. Pita Polanco?  - Pt works mostly sedentary and is able to elevate leg a little bit (not at heart level) while at w

## 2018-01-24 NOTE — TELEPHONE ENCOUNTER
Call back to [facility]. Notified of 2 weeks off of work. And obtaining H &P.  Pt verbalizes understanding

## 2018-01-24 NOTE — TELEPHONE ENCOUNTER
Spoke to pt and scheduled surgery with SCR for 03/14/18 at [facility].  Informed pt that [facility] will call 1-2 days prior to surgery to discuss the following:  - Time of surgery and when to be there  - Medications and allergies  - Medical History and Surgical Histor

## 2018-01-29 ENCOUNTER — PATIENT MESSAGE (OUTPATIENT)
Dept: GASTROENTEROLOGY | Facility: CLINIC | Age: 68
End: 2018-01-29

## 2018-01-30 NOTE — TELEPHONE ENCOUNTER
From: Arianna Barros  To: Kim Lei MD  Sent: 1/29/2018 5:28 PM CST  Subject: Prescription Question    Hi Dr. Zoe Colon, I received a notice from [facility] that the Rx for Mesalamine had been submitted to them.  However, I just called to find

## 2018-01-30 NOTE — TELEPHONE ENCOUNTER
Your allergy list includes allergy to sulfa, so it is contraindicated to prescribe. Can you go with the first medication that we tried, Delzicol? GI RN, please transmit this to the patient.

## 2018-02-01 NOTE — TELEPHONE ENCOUNTER
Report from [facility], Dr. Argenis Kolb inflammatory bowel disease department reviewed.   Dr. Terrell Haywood recommended that \"while she is reluctant to continue the Remicade, we stressed the importance of not only continuing therapy but optimizing the

## 2018-02-06 ENCOUNTER — PATIENT MESSAGE (OUTPATIENT)
Dept: GASTROENTEROLOGY | Facility: CLINIC | Age: 68
End: 2018-02-06

## 2018-02-06 DIAGNOSIS — K51.019 CHRONIC ULCERATIVE ENTEROCOLITIS WITH COMPLICATION (HCC): Primary | ICD-10-CM

## 2018-02-06 NOTE — TELEPHONE ENCOUNTER
Please advise order for Solumedrol.  I will put it on a prescription pad and send it to the [facility] for tomorrow's Remicade Infusion

## 2018-02-06 NOTE — TELEPHONE ENCOUNTER
See 2/6 email encounter where pt calling back to check status of below. This message below just picked up. Dr Daria Conner paged. Pt states is due for remicade tomorrow . None of below have been addressed.  Pt states Dr Argenis Olson had recommended the remicade

## 2018-02-06 NOTE — TELEPHONE ENCOUNTER
Sent call to Rn - Pt checking status on message - requesting to speak with RN. Pls call [phone]. Thank you.

## 2018-02-06 NOTE — TELEPHONE ENCOUNTER
Last dexa scan 09/29/17    Last eye exam with Dr Dayday Ponce in [facility] a few months ago. Pt will get a copy of the OV    Last Derm appt 10/17    Pt declined influenza vaccine this year because she states she was supposed to avoid \"live\" virus.     Has not had a

## 2018-02-06 NOTE — TELEPHONE ENCOUNTER
DR Indira Whitney,    Please sign the Infliximab Activity and Antibody order and please advise if you want the pt to have IV  steroid with her Remicade infusion tomorrow

## 2018-02-06 NOTE — TELEPHONE ENCOUNTER
These have patient get the Remicade level today. Get her Remicade infusion tomorrow regardless whether the results are back. Address the maintenance issues after her infusion. Him at [facility].  I cannot sign the infusion sheet today.   Please have

## 2018-02-07 ENCOUNTER — LAB ENCOUNTER (OUTPATIENT)
Dept: LAB | Facility: HOSPITAL | Age: 68
End: 2018-02-07
Attending: INTERNAL MEDICINE
Payer: COMMERCIAL

## 2018-02-07 ENCOUNTER — OFFICE VISIT (OUTPATIENT)
Dept: HEMATOLOGY/ONCOLOGY | Facility: HOSPITAL | Age: 68
End: 2018-02-07
Attending: INTERNAL MEDICINE
Payer: COMMERCIAL

## 2018-02-07 VITALS
TEMPERATURE: 98 F | BODY MASS INDEX: 23 KG/M2 | DIASTOLIC BLOOD PRESSURE: 51 MMHG | WEIGHT: 151 LBS | RESPIRATION RATE: 16 BRPM | SYSTOLIC BLOOD PRESSURE: 95 MMHG | HEART RATE: 63 BPM

## 2018-02-07 DIAGNOSIS — K51.011 ULCERATIVE CHRONIC PANCOLITIS WITH RECTAL BLEEDING (HCC): Primary | ICD-10-CM

## 2018-02-07 DIAGNOSIS — K51.011 ULCERATIVE PANCOLITIS WITH RECTAL BLEEDING (HCC): ICD-10-CM

## 2018-02-07 DIAGNOSIS — K51.019 ULCERATIVE COLITIS, UNIVERSAL, UNSPECIFIED COMPLICATION (HCC): Primary | ICD-10-CM

## 2018-02-07 PROCEDURE — 96365 THER/PROPH/DIAG IV INF INIT: CPT

## 2018-02-07 PROCEDURE — 96366 THER/PROPH/DIAG IV INF ADDON: CPT

## 2018-02-07 PROCEDURE — 96413 CHEMO IV INFUSION 1 HR: CPT

## 2018-02-07 PROCEDURE — A4216 STERILE WATER/SALINE, 10 ML: HCPCS

## 2018-02-07 PROCEDURE — 96375 TX/PRO/DX INJ NEW DRUG ADDON: CPT

## 2018-02-07 PROCEDURE — 96415 CHEMO IV INFUSION ADDL HR: CPT

## 2018-02-07 PROCEDURE — 86352 CELL FUNCTION ASSAY W/STIM: CPT

## 2018-02-07 PROCEDURE — 36415 COLL VENOUS BLD VENIPUNCTURE: CPT

## 2018-02-07 RX ORDER — METHYLPREDNISOLONE SODIUM SUCCINATE 40 MG/ML
20 INJECTION, POWDER, LYOPHILIZED, FOR SOLUTION INTRAMUSCULAR; INTRAVENOUS ONCE
Status: CANCELLED
Start: 2018-02-07 | End: 2018-02-07

## 2018-02-07 RX ORDER — METHYLPREDNISOLONE SODIUM SUCCINATE 40 MG/ML
20 INJECTION, POWDER, LYOPHILIZED, FOR SOLUTION INTRAMUSCULAR; INTRAVENOUS ONCE
Status: COMPLETED | OUTPATIENT
Start: 2018-02-07 | End: 2018-02-07

## 2018-02-07 RX ORDER — ACETAMINOPHEN 325 MG/1
650 TABLET ORAL ONCE
Status: COMPLETED | OUTPATIENT
Start: 2018-02-07 | End: 2018-02-07

## 2018-02-07 RX ORDER — 0.9 % SODIUM CHLORIDE 0.9 %
VIAL (ML) INJECTION
Status: DISCONTINUED
Start: 2018-02-07 | End: 2018-02-07

## 2018-02-07 RX ORDER — METHYLPREDNISOLONE SODIUM SUCCINATE 40 MG/ML
INJECTION, POWDER, LYOPHILIZED, FOR SOLUTION INTRAMUSCULAR; INTRAVENOUS
Status: COMPLETED
Start: 2018-02-07 | End: 2018-02-07

## 2018-02-07 RX ORDER — ACETAMINOPHEN 325 MG/1
TABLET ORAL
Status: COMPLETED
Start: 2018-02-07 | End: 2018-02-07

## 2018-02-07 RX ORDER — ACETAMINOPHEN 325 MG/1
650 TABLET ORAL ONCE
Status: CANCELLED | OUTPATIENT
Start: 2018-02-07

## 2018-02-07 RX ORDER — SODIUM CHLORIDE 9 MG/ML
INJECTION, SOLUTION INTRAVENOUS
Status: DISCONTINUED
Start: 2018-02-07 | End: 2018-02-07

## 2018-02-07 RX ADMIN — ACETAMINOPHEN 650 MG: 325 TABLET ORAL at 15:55:00

## 2018-02-07 RX ADMIN — METHYLPREDNISOLONE SODIUM SUCCINATE 20 MG: 40 INJECTION, POWDER, LYOPHILIZED, FOR SOLUTION INTRAMUSCULAR; INTRAVENOUS at 15:45:00

## 2018-02-07 NOTE — PROGRESS NOTES
Madelyn to infusion today for Q8 week Remicade. She arrives alert and independent. No complaints, states her stools have \"normalized\". She denies abdominal pain. No changes in medications or allergies.  She states she just had labs done today and that th

## 2018-02-07 NOTE — TELEPHONE ENCOUNTER
I faxed the Solumedrol order to [facility]. I spoke to Henry Sharma and she did receive the order. She will remind the pt she needs her Remicade level drawn before her infusion.      I also received OV notes from Dr Sorto Grand her ophthalmologist and placed on Dr Donnal Claude

## 2018-02-08 ENCOUNTER — MED REC SCAN ONLY (OUTPATIENT)
Dept: INTERNAL MEDICINE CLINIC | Facility: CLINIC | Age: 68
End: 2018-02-08

## 2018-02-08 NOTE — TELEPHONE ENCOUNTER
Orders for infliximab antibody signed. Records from eye doctor office reviewed. Dr. Teresa Chow, [facility] ophthalmology. Diagnosed with cataracts, referred to Dr. Kaylah Traore.  Report sent for scanning.

## 2018-02-10 LAB
INFLIXIMAB ACTIVITY: 11.16 UG/ML
INFLIXIMAB NEUTRALIZING AB TTR: NOT DETECTED

## 2018-02-19 ENCOUNTER — OFFICE VISIT (OUTPATIENT)
Dept: INTERNAL MEDICINE CLINIC | Facility: CLINIC | Age: 68
End: 2018-02-19

## 2018-02-19 ENCOUNTER — PATIENT MESSAGE (OUTPATIENT)
Dept: GASTROENTEROLOGY | Facility: CLINIC | Age: 68
End: 2018-02-19

## 2018-02-19 ENCOUNTER — LAB ENCOUNTER (OUTPATIENT)
Dept: LAB | Age: 68
End: 2018-02-19
Attending: INTERNAL MEDICINE
Payer: COMMERCIAL

## 2018-02-19 ENCOUNTER — APPOINTMENT (OUTPATIENT)
Dept: LAB | Age: 68
End: 2018-02-19
Attending: INTERNAL MEDICINE
Payer: COMMERCIAL

## 2018-02-19 ENCOUNTER — TELEPHONE (OUTPATIENT)
Dept: GASTROENTEROLOGY | Facility: CLINIC | Age: 68
End: 2018-02-19

## 2018-02-19 VITALS
DIASTOLIC BLOOD PRESSURE: 68 MMHG | SYSTOLIC BLOOD PRESSURE: 133 MMHG | WEIGHT: 152 LBS | BODY MASS INDEX: 23 KG/M2 | HEART RATE: 76 BPM

## 2018-02-19 DIAGNOSIS — D84.9 IMMUNOSUPPRESSED STATUS (HCC): ICD-10-CM

## 2018-02-19 DIAGNOSIS — Z01.818 PRE-OP EXAM: ICD-10-CM

## 2018-02-19 DIAGNOSIS — Z01.818 PRE-OP EXAM: Primary | ICD-10-CM

## 2018-02-19 DIAGNOSIS — K51.00 ULCERATIVE PANCOLITIS WITHOUT COMPLICATION (HCC): ICD-10-CM

## 2018-02-19 DIAGNOSIS — Z91.040 LATEX SENSITIVITY: ICD-10-CM

## 2018-02-19 DIAGNOSIS — Z01.818 PRE-OPERATIVE CLEARANCE: ICD-10-CM

## 2018-02-19 DIAGNOSIS — M21.611 BUNION OF RIGHT FOOT: ICD-10-CM

## 2018-02-19 LAB
ALBUMIN SERPL BCP-MCNC: 4 G/DL (ref 3.5–4.8)
ALBUMIN/GLOB SERPL: 1.2 {RATIO} (ref 1–2)
ALP SERPL-CCNC: 54 U/L (ref 32–100)
ALT SERPL-CCNC: 24 U/L (ref 14–54)
ANION GAP SERPL CALC-SCNC: 8 MMOL/L (ref 0–18)
AST SERPL-CCNC: 29 U/L (ref 15–41)
BASOPHILS # BLD: 0 K/UL (ref 0–0.2)
BASOPHILS NFR BLD: 1 %
BILIRUB SERPL-MCNC: 0.5 MG/DL (ref 0.3–1.2)
BILIRUB UR QL: NEGATIVE
BUN SERPL-MCNC: 11 MG/DL (ref 8–20)
BUN/CREAT SERPL: 14.5 (ref 10–20)
CALCIUM SERPL-MCNC: 9.2 MG/DL (ref 8.5–10.5)
CHLORIDE SERPL-SCNC: 102 MMOL/L (ref 95–110)
CLARITY UR: CLEAR
CO2 SERPL-SCNC: 28 MMOL/L (ref 22–32)
COLOR UR: YELLOW
CREAT SERPL-MCNC: 0.76 MG/DL (ref 0.5–1.5)
EOSINOPHIL # BLD: 0.1 K/UL (ref 0–0.7)
EOSINOPHIL NFR BLD: 3 %
ERYTHROCYTE [DISTWIDTH] IN BLOOD BY AUTOMATED COUNT: 13.1 % (ref 11–15)
GLOBULIN PLAS-MCNC: 3.3 G/DL (ref 2.5–3.7)
GLUCOSE SERPL-MCNC: 98 MG/DL (ref 70–99)
GLUCOSE UR-MCNC: NEGATIVE MG/DL
HCT VFR BLD AUTO: 37.4 % (ref 35–48)
HGB BLD-MCNC: 12.7 G/DL (ref 12–16)
HGB UR QL STRIP.AUTO: NEGATIVE
KETONES UR-MCNC: NEGATIVE MG/DL
LYMPHOCYTES # BLD: 1.5 K/UL (ref 1–4)
LYMPHOCYTES NFR BLD: 34 %
MCH RBC QN AUTO: 31.7 PG (ref 27–32)
MCHC RBC AUTO-ENTMCNC: 33.9 G/DL (ref 32–37)
MCV RBC AUTO: 93.3 FL (ref 80–100)
MONOCYTES # BLD: 0.4 K/UL (ref 0–1)
MONOCYTES NFR BLD: 10 %
NEUTROPHILS # BLD AUTO: 2.3 K/UL (ref 1.8–7.7)
NEUTROPHILS NFR BLD: 52 %
NITRITE UR QL STRIP.AUTO: POSITIVE
OSMOLALITY UR CALC.SUM OF ELEC: 285 MOSM/KG (ref 275–295)
PATIENT FASTING: NO
PH UR: 6 [PH] (ref 5–8)
PLATELET # BLD AUTO: 221 K/UL (ref 140–400)
PMV BLD AUTO: 9 FL (ref 7.4–10.3)
POTASSIUM SERPL-SCNC: 3.7 MMOL/L (ref 3.3–5.1)
PROT SERPL-MCNC: 7.3 G/DL (ref 5.9–8.4)
PROT UR-MCNC: NEGATIVE MG/DL
RBC # BLD AUTO: 4.01 M/UL (ref 3.7–5.4)
RBC #/AREA URNS AUTO: <1 /HPF
SODIUM SERPL-SCNC: 138 MMOL/L (ref 136–144)
SP GR UR STRIP: 1.01 (ref 1–1.03)
UROBILINOGEN UR STRIP-ACNC: <2
VIT C UR-MCNC: NEGATIVE MG/DL
WBC # BLD AUTO: 4.4 K/UL (ref 4–11)
WBC #/AREA URNS AUTO: 1 /HPF

## 2018-02-19 PROCEDURE — 99212 OFFICE O/P EST SF 10 MIN: CPT | Performed by: INTERNAL MEDICINE

## 2018-02-19 PROCEDURE — 87186 SC STD MICRODIL/AGAR DIL: CPT

## 2018-02-19 PROCEDURE — 93010 ELECTROCARDIOGRAM REPORT: CPT | Performed by: INTERNAL MEDICINE

## 2018-02-19 PROCEDURE — 99214 OFFICE O/P EST MOD 30 MIN: CPT | Performed by: INTERNAL MEDICINE

## 2018-02-19 PROCEDURE — 80053 COMPREHEN METABOLIC PANEL: CPT

## 2018-02-19 PROCEDURE — 81001 URINALYSIS AUTO W/SCOPE: CPT

## 2018-02-19 PROCEDURE — 36415 COLL VENOUS BLD VENIPUNCTURE: CPT

## 2018-02-19 PROCEDURE — 85025 COMPLETE CBC W/AUTO DIFF WBC: CPT

## 2018-02-19 PROCEDURE — 93005 ELECTROCARDIOGRAM TRACING: CPT

## 2018-02-19 PROCEDURE — 87077 CULTURE AEROBIC IDENTIFY: CPT

## 2018-02-19 PROCEDURE — 87086 URINE CULTURE/COLONY COUNT: CPT

## 2018-02-19 NOTE — PROGRESS NOTES
HPI:    Patient ID: Shae Lerner is a 78year old female. Pt is here for pre-op for right bunion surgery and 2nd toe hammertoe surgery by Dr. Amna Hoskins on Wednesday March 14th. She has chronic foot pain and her toes are going numb.     She had an inj Current Outpatient Prescriptions:  Calcium 250 MG Oral Cap Take 2 tablets by mouth 2 (two) times daily. Disp: 360 capsule Rfl: 4   Biotin 5000 MCG Sublingual SL Tab Place 1 capsule under the tongue daily.  Disp: 90 tablet Rfl: 4   folic acid 1 MG Oral T time. She appears well-developed and well-nourished. No distress. HENT:   Head: Normocephalic and atraumatic.    Right Ear: Tympanic membrane and ear canal normal.   Left Ear: Tympanic membrane and ear canal normal.   Nose: Nose normal.   Mouth/Throat: Or

## 2018-02-21 ENCOUNTER — TELEPHONE (OUTPATIENT)
Dept: GASTROENTEROLOGY | Facility: CLINIC | Age: 68
End: 2018-02-21

## 2018-02-21 RX ORDER — MESALAMINE 400 MG/1
800 CAPSULE, DELAYED RELEASE ORAL
Qty: 180 CAPSULE | Refills: 11 | Status: SHIPPED | OUTPATIENT
Start: 2018-02-21 | End: 2018-06-08

## 2018-02-21 NOTE — TELEPHONE ENCOUNTER
Reordered Delzicol to the new pharmacy.      Dr Eileen Rebolledo please sign pended order    See note below

## 2018-02-21 NOTE — TELEPHONE ENCOUNTER
Pt is not sure if she is to receive solumedrol prior to each Remicade infusion or if it was a one time dose with the last infusion    Also wondering how often we should draw the infliximab activity.  Last done 02/07/18    I put a call into [facility] Dr Nicole kay

## 2018-02-21 NOTE — TELEPHONE ENCOUNTER
Pt calling in for precrib to [facility] PH# [phone], for rx:Delzicol, pls call at:[phone],thanks. *Indicates cheaper at [facility] and they have been given all her ins info.     Current Outpatient Prescriptions:   •  DELZICOL 400 MG Oral Capsule Roselyn

## 2018-02-21 NOTE — TELEPHONE ENCOUNTER
From: Caitlyn Gonzalez  To: Malachi Mora MD  Sent: 2/19/2018 12:28 PM CST  Subject: Test Results Question    Dr Anthony Soto,   I received a letter from your office regarding my infliximib (Remicade) & wondering if that has any bearing on also kimberlyn

## 2018-02-23 NOTE — TELEPHONE ENCOUNTER
I have reviewed the excellent consultation from Dr. Sarath Boss at [facility]. (Sent for scanning). Recommendations include 1. Check Remicade level (last level on 2/7/2018 was 11.6). No infliximab antibody detected    2.   Recommend a dose

## 2018-02-23 NOTE — TELEPHONE ENCOUNTER
Dr Beltran Rape,    I placed notes on your desk with Dr Tae Holder and Recommendations. He addresses the solumedrol question and states when he thinks Remicade levels should be drawn. Please refer to page 3 and 4.       Telephone Information:   Home Phone

## 2018-02-28 NOTE — TELEPHONE ENCOUNTER
I faxed an order to [facility] in the [facility] to add hydrocortisone 200 mg IV prior to each Remicade infusion    Last Dexa Scan was 09/29/17    She is talking to her PCP regarding her vaccines    Dr Rey White,    Pt asking if you can prescribe her VWVUK-K 01

## 2018-02-28 NOTE — TELEPHONE ENCOUNTER
Please see orders below and talk to the patient regarding advice already given.   I can mail the prescription, please fill it out and placed on my desk

## 2018-03-08 ENCOUNTER — TELEPHONE (OUTPATIENT)
Dept: PODIATRY CLINIC | Facility: CLINIC | Age: 68
End: 2018-03-08

## 2018-03-08 PROCEDURE — 87077 CULTURE AEROBIC IDENTIFY: CPT | Performed by: INTERNAL MEDICINE

## 2018-03-08 PROCEDURE — 87086 URINE CULTURE/COLONY COUNT: CPT | Performed by: INTERNAL MEDICINE

## 2018-03-08 PROCEDURE — 87186 SC STD MICRODIL/AGAR DIL: CPT | Performed by: INTERNAL MEDICINE

## 2018-03-08 NOTE — TELEPHONE ENCOUNTER
Pls see pt email from 2/20, pt was on vacation and was unable to make an appt, pt has sx on 3/14 and would like her questions addressed. Pt requesting to speak to RN. Pls advise thank you.

## 2018-03-09 NOTE — TELEPHONE ENCOUNTER
Spoke to pt and discussed message from [facility] in [address] message from 02/20/18. Pt decided to reschedule surgery and an appt prior to discuss the surgery in greater detail with SCR. Surgery rescheduled to 02/21/18 at [facility] with SCR.    Rescheduled PO appts wi

## 2018-03-09 NOTE — TELEPHONE ENCOUNTER
Called [facility] and spoke to Coby and informed her of surgery rescheduled to 03/21/18. Faxed surgical scheduling form to [facility] notifying of rescheduled surgery.

## 2018-03-11 ENCOUNTER — TELEPHONE (OUTPATIENT)
Dept: INTERNAL MEDICINE CLINIC | Facility: CLINIC | Age: 68
End: 2018-03-11

## 2018-03-11 DIAGNOSIS — N30.01 ACUTE CYSTITIS WITH HEMATURIA: ICD-10-CM

## 2018-03-11 DIAGNOSIS — R82.71 ASYMPTOMATIC BACTERIURIA: Primary | ICD-10-CM

## 2018-03-11 RX ORDER — CEPHALEXIN 500 MG/1
500 CAPSULE ORAL 2 TIMES DAILY
Qty: 14 CAPSULE | Refills: 0 | Status: SHIPPED | OUTPATIENT
Start: 2018-03-11 | End: 2018-03-18

## 2018-03-11 NOTE — TELEPHONE ENCOUNTER
Called pt: She has asymptomatic bacteruria. This is a second time we will be treating it prior to her surgery. I will prescribe Keflex again because it is sensitive to the Keflex. We will repeat the UA in 1 week.

## 2018-03-14 NOTE — TELEPHONE ENCOUNTER
Spoke to pt and informed her of SCR response to her concern. Pt asking for second PO on 04/04/18 to be rescheduled to later time that same day. Rescheduled PO to 3:10 PM on 04/04/18 at [facility] with SCR. Pt verbalized understanding.

## 2018-03-19 ENCOUNTER — TELEPHONE (OUTPATIENT)
Dept: GASTROENTEROLOGY | Facility: CLINIC | Age: 68
End: 2018-03-19

## 2018-03-19 ENCOUNTER — OFFICE VISIT (OUTPATIENT)
Dept: PODIATRY CLINIC | Facility: CLINIC | Age: 68
End: 2018-03-19

## 2018-03-19 ENCOUNTER — APPOINTMENT (OUTPATIENT)
Dept: LAB | Facility: HOSPITAL | Age: 68
End: 2018-03-19
Attending: INTERNAL MEDICINE
Payer: COMMERCIAL

## 2018-03-19 ENCOUNTER — PATIENT MESSAGE (OUTPATIENT)
Dept: PODIATRY CLINIC | Facility: CLINIC | Age: 68
End: 2018-03-19

## 2018-03-19 DIAGNOSIS — M79.671 PAIN IN BOTH FEET: Primary | ICD-10-CM

## 2018-03-19 DIAGNOSIS — M21.612 BILATERAL BUNIONS: ICD-10-CM

## 2018-03-19 DIAGNOSIS — M20.41 HAMMER TOE OF RIGHT FOOT: ICD-10-CM

## 2018-03-19 DIAGNOSIS — M21.611 BILATERAL BUNIONS: ICD-10-CM

## 2018-03-19 DIAGNOSIS — M79.672 PAIN IN BOTH FEET: Primary | ICD-10-CM

## 2018-03-19 LAB
BILIRUB UR QL: NEGATIVE
CLARITY UR: CLEAR
COLOR UR: YELLOW
GLUCOSE UR-MCNC: NEGATIVE MG/DL
HGB UR QL STRIP.AUTO: NEGATIVE
KETONES UR-MCNC: NEGATIVE MG/DL
LEUKOCYTE ESTERASE UR QL STRIP.AUTO: NEGATIVE
NITRITE UR QL STRIP.AUTO: NEGATIVE
PH UR: 6 [PH] (ref 5–8)
PROT UR-MCNC: NEGATIVE MG/DL
SP GR UR STRIP: 1.02 (ref 1–1.03)
UROBILINOGEN UR STRIP-ACNC: <2
VIT C UR-MCNC: NEGATIVE MG/DL

## 2018-03-19 PROCEDURE — 81003 URINALYSIS AUTO W/O SCOPE: CPT | Performed by: INTERNAL MEDICINE

## 2018-03-19 PROCEDURE — 99212 OFFICE O/P EST SF 10 MIN: CPT | Performed by: PODIATRIST

## 2018-03-19 PROCEDURE — 99213 OFFICE O/P EST LOW 20 MIN: CPT | Performed by: PODIATRIST

## 2018-03-19 NOTE — TELEPHONE ENCOUNTER
Pt calling to kassi spring RN, Lois España regarding order for lab that she thought was not required, pls call at:[phone],thanks.   *If not required, asking to withdrawal order

## 2018-03-19 NOTE — TELEPHONE ENCOUNTER
Pt advised she does not need to repeat the infliximab level at this time.     This was a duplicate order    Pt verbalizes understanding

## 2018-03-19 NOTE — PROGRESS NOTES
HPI:    Patient ID: Prashant Parent is a 78year old female. HPI  80-year-old female presents and wishes to discuss surgery scheduled on her right foot.   Patient is a progressive and increasing bunion deformity with contracted and deformed second digit detailed procedure, postoperative course, potential concerns and complications. I described fixation, dressing changes suture removal and a 3 month recovery. I have answered all of her questions and is my sense this patient is well-informed.   Plan follow

## 2018-03-20 ENCOUNTER — OFFICE VISIT (OUTPATIENT)
Dept: OBGYN CLINIC | Facility: CLINIC | Age: 68
End: 2018-03-20

## 2018-03-20 ENCOUNTER — TELEPHONE (OUTPATIENT)
Dept: PODIATRY CLINIC | Facility: CLINIC | Age: 68
End: 2018-03-20

## 2018-03-20 VITALS — BODY MASS INDEX: 23 KG/M2 | WEIGHT: 154 LBS

## 2018-03-20 DIAGNOSIS — Z12.31 VISIT FOR SCREENING MAMMOGRAM: ICD-10-CM

## 2018-03-20 DIAGNOSIS — Z01.419 ENCOUNTER FOR GYNECOLOGICAL EXAMINATION WITHOUT ABNORMAL FINDING: Primary | ICD-10-CM

## 2018-03-20 DIAGNOSIS — Z90.710 HISTORY OF HYSTERECTOMY: ICD-10-CM

## 2018-03-20 PROCEDURE — 99397 PER PM REEVAL EST PAT 65+ YR: CPT | Performed by: OBSTETRICS & GYNECOLOGY

## 2018-03-20 NOTE — TELEPHONE ENCOUNTER
From: Jorje Montgomery  To: Mark Robertson DPM  Sent: 3/19/2018 11:50 PM CDT  Subject: Visit Follow-up Question    I met with Dr Bita Lou this morning for my upcoming surgery on Wednesday, 3/21/18.   Please confirm that surgery has been scheduled & I

## 2018-03-20 NOTE — TELEPHONE ENCOUNTER
Spoke to pt and informed her that she is scheduled for surgery tomorrow with SCR at [facility] and that she will be getting a call from [facility] later on this afternoon. Pt verbalized understanding.

## 2018-03-20 NOTE — PROGRESS NOTES
Jc Robb is a 78year old female  No LMP recorded. Patient is postmenopausal. Patient presents with:  Gyn Exam: doing well. hx TL   .     OBSTETRICS HISTORY:  OB History    Para Term  AB Living   3 2 2   1 2   SAB TAB Ecto Drug use: No    Sexual activity: Not on file     Other Topics Concern    Caffeine Concern Yes    Comment: coffee, 2 cups daily    Pt has a pacemaker No    Pt has a defibrillator No    Reaction to local anesthetic No     Social History Narrative   None on f itching  Musculoskeletal:   denies back pain. Skin/Breast:   denies any breast pain, lumps, or discharge. Neurological:    denies headaches, extremity weakness or numbness. Psychiatric:   denies depression or anxiety.   Endocrine:     denies excessive t

## 2018-03-27 ENCOUNTER — TELEPHONE (OUTPATIENT)
Dept: PODIATRY CLINIC | Facility: CLINIC | Age: 68
End: 2018-03-27

## 2018-03-27 ENCOUNTER — OFFICE VISIT (OUTPATIENT)
Dept: PODIATRY CLINIC | Facility: CLINIC | Age: 68
End: 2018-03-27

## 2018-03-27 DIAGNOSIS — M21.611 BILATERAL BUNIONS: Primary | ICD-10-CM

## 2018-03-27 DIAGNOSIS — M20.41 HAMMER TOE OF RIGHT FOOT: ICD-10-CM

## 2018-03-27 DIAGNOSIS — M21.612 BILATERAL BUNIONS: Primary | ICD-10-CM

## 2018-03-27 PROCEDURE — 99024 POSTOP FOLLOW-UP VISIT: CPT | Performed by: PODIATRIST

## 2018-03-27 PROCEDURE — 99212 OFFICE O/P EST SF 10 MIN: CPT | Performed by: PODIATRIST

## 2018-03-27 NOTE — TELEPHONE ENCOUNTER
Yes she may return tomorrow with sitting the majority of the day.  She must wear the surgical shoe. scr

## 2018-03-27 NOTE — PROGRESS NOTES
HPI:    Patient ID: Joselito Castro is a 78year old female. HPI  This 69-year-old female presents 1 week post right forefoot surgery. She states that she is taking no pain medications and thinks that she is doing very well.   Review of Systems  I did

## 2018-03-27 NOTE — TELEPHONE ENCOUNTER
Spoke to pt and informed her that work note will be written. Discussed note with pt and she was okay with note. Pt will obtain note in [employer].

## 2018-03-27 NOTE — TELEPHONE ENCOUNTER
Dr Lilli Hollingsworth, for how long should I put her restriction to sit the majority of the day and wear the surgical shoe?

## 2018-03-27 NOTE — TELEPHONE ENCOUNTER
Pt saw SCR today, pt was informed it was ok to return to work, pt needs note to return to work, asking for note to be put on [employer]. Pls advise thank you.

## 2018-04-03 ENCOUNTER — TELEPHONE (OUTPATIENT)
Dept: GASTROENTEROLOGY | Facility: CLINIC | Age: 68
End: 2018-04-03

## 2018-04-03 NOTE — TELEPHONE ENCOUNTER
Pt states that she is scheduled for remicade infusion tomorrow and was told per infusion center that it would include dose of hydrocortisone but she was expecting solumedrol. Please call.

## 2018-04-04 ENCOUNTER — OFFICE VISIT (OUTPATIENT)
Dept: PODIATRY CLINIC | Facility: CLINIC | Age: 68
End: 2018-04-04

## 2018-04-04 ENCOUNTER — HOSPITAL ENCOUNTER (OUTPATIENT)
Dept: GENERAL RADIOLOGY | Facility: HOSPITAL | Age: 68
Discharge: HOME | End: 2018-04-04
Attending: PODIATRIST
Payer: COMMERCIAL

## 2018-04-04 ENCOUNTER — OFFICE VISIT (OUTPATIENT)
Dept: HEMATOLOGY/ONCOLOGY | Facility: HOSPITAL | Age: 68
End: 2018-04-04
Attending: INTERNAL MEDICINE
Payer: COMMERCIAL

## 2018-04-04 VITALS
DIASTOLIC BLOOD PRESSURE: 53 MMHG | SYSTOLIC BLOOD PRESSURE: 102 MMHG | RESPIRATION RATE: 16 BRPM | TEMPERATURE: 98 F | HEART RATE: 65 BPM

## 2018-04-04 DIAGNOSIS — Z47.89 ORTHOPEDIC AFTERCARE: ICD-10-CM

## 2018-04-04 DIAGNOSIS — M21.611 BILATERAL BUNIONS: ICD-10-CM

## 2018-04-04 DIAGNOSIS — M20.41 HAMMER TOE OF RIGHT FOOT: ICD-10-CM

## 2018-04-04 DIAGNOSIS — Z47.89 ORTHOPEDIC AFTERCARE: Primary | ICD-10-CM

## 2018-04-04 DIAGNOSIS — M21.612 BILATERAL BUNIONS: ICD-10-CM

## 2018-04-04 DIAGNOSIS — K51.011 ULCERATIVE PANCOLITIS WITH RECTAL BLEEDING (HCC): ICD-10-CM

## 2018-04-04 DIAGNOSIS — K51.011 ULCERATIVE CHRONIC PANCOLITIS WITH RECTAL BLEEDING (HCC): Primary | ICD-10-CM

## 2018-04-04 PROCEDURE — 99212 OFFICE O/P EST SF 10 MIN: CPT | Performed by: PODIATRIST

## 2018-04-04 PROCEDURE — 73630 X-RAY EXAM OF FOOT: CPT | Performed by: PODIATRIST

## 2018-04-04 PROCEDURE — 96413 CHEMO IV INFUSION 1 HR: CPT

## 2018-04-04 PROCEDURE — 99024 POSTOP FOLLOW-UP VISIT: CPT | Performed by: PODIATRIST

## 2018-04-04 PROCEDURE — 96415 CHEMO IV INFUSION ADDL HR: CPT

## 2018-04-04 PROCEDURE — 96375 TX/PRO/DX INJ NEW DRUG ADDON: CPT

## 2018-04-04 RX ORDER — ACETAMINOPHEN 325 MG/1
TABLET ORAL
Status: DISCONTINUED
Start: 2018-04-04 | End: 2018-04-04

## 2018-04-04 RX ORDER — SODIUM CHLORIDE 9 MG/ML
INJECTION, SOLUTION INTRAVENOUS
Status: DISCONTINUED
Start: 2018-04-04 | End: 2018-04-04

## 2018-04-04 RX ORDER — ACETAMINOPHEN AND CODEINE PHOSPHATE 300; 30 MG/1; MG/1
TABLET ORAL
Refills: 0 | COMMUNITY
Start: 2018-03-22 | End: 2018-04-12 | Stop reason: ALTCHOICE

## 2018-04-04 RX ORDER — ACETAMINOPHEN 325 MG/1
650 TABLET ORAL ONCE
Status: COMPLETED | OUTPATIENT
Start: 2018-04-04 | End: 2018-04-04

## 2018-04-04 RX ORDER — ACETAMINOPHEN 325 MG/1
650 TABLET ORAL ONCE
Status: CANCELLED | OUTPATIENT
Start: 2018-04-04

## 2018-04-04 RX ADMIN — ACETAMINOPHEN 650 MG: 325 TABLET ORAL at 16:20:00

## 2018-04-04 NOTE — TELEPHONE ENCOUNTER
I explained to the pt that solumedrol and hydrocortisone are basically the same. Dr Nita Guo suggested the hydrocortisone 200 mg prior to the Remicade and this is what she will be getting. She verbalizes understanding.  See e mail encounter from 02/19/18

## 2018-04-04 NOTE — PROGRESS NOTES
HPI:    Patient ID: Joselito Castro is a 78year old female. HPI  This 60-year-old female presents postsurgical at 2 weeks on right foot surgery.   She has no specific complaints  Review of Systems         Current Outpatient Prescriptions:  mesalamine (

## 2018-04-04 NOTE — PROGRESS NOTES
Madelyn to infusion today for week 8 Remicade. She arrived ambulatory and states her ulcerative colitis is controlled , no flare ups.  Her bowels are normal, denies any abdominal pain  denies any recent illness or fevers  Patient given pre-med of Tylenol 6

## 2018-04-05 ENCOUNTER — TELEPHONE (OUTPATIENT)
Dept: PODIATRY CLINIC | Facility: CLINIC | Age: 68
End: 2018-04-05

## 2018-04-05 NOTE — TELEPHONE ENCOUNTER
Spoke with pt to schedule 3 week post-op follow up appointment. Appointment made for 4/12/18 at 1330 at [facility] office. Pt verbalized understanding.

## 2018-04-05 NOTE — TELEPHONE ENCOUNTER
pt called. LOV 4/4/18 with SCR. Was advised to make a one week follow up appt. No available openings. She would like an appt in [address] [place] [address]. Please advise. Thank you.

## 2018-04-12 ENCOUNTER — OFFICE VISIT (OUTPATIENT)
Dept: PODIATRY CLINIC | Facility: CLINIC | Age: 68
End: 2018-04-12

## 2018-04-12 DIAGNOSIS — M21.611 BILATERAL BUNIONS: Primary | ICD-10-CM

## 2018-04-12 DIAGNOSIS — M20.41 HAMMER TOE OF RIGHT FOOT: ICD-10-CM

## 2018-04-12 DIAGNOSIS — M21.612 BILATERAL BUNIONS: Primary | ICD-10-CM

## 2018-04-12 PROCEDURE — 99212 OFFICE O/P EST SF 10 MIN: CPT | Performed by: PODIATRIST

## 2018-04-12 PROCEDURE — 99024 POSTOP FOLLOW-UP VISIT: CPT | Performed by: PODIATRIST

## 2018-04-12 NOTE — PROGRESS NOTES
HPI:    Patient ID: Jose Rafael Mitchell is a 78year old female. HPI  13-year-old female presents 3 weeks post right forefoot surgery with no specific noted complaints or concerns. Dressing was dry and clean.   Review of Systems         Current Outpatient

## 2018-04-16 NOTE — TELEPHONE ENCOUNTER
Pt is aware she will be receiving the hydrocortisone prior to each infusion.      Dr Lucas Pablo,    Please advise how often you will be checking a Remicade Level

## 2018-04-19 ENCOUNTER — OFFICE VISIT (OUTPATIENT)
Dept: PODIATRY CLINIC | Facility: CLINIC | Age: 68
End: 2018-04-19

## 2018-04-19 DIAGNOSIS — M20.41 HAMMER TOE OF RIGHT FOOT: ICD-10-CM

## 2018-04-19 DIAGNOSIS — M21.611 BILATERAL BUNIONS: Primary | ICD-10-CM

## 2018-04-19 DIAGNOSIS — M21.612 BILATERAL BUNIONS: Primary | ICD-10-CM

## 2018-04-19 PROCEDURE — 99024 POSTOP FOLLOW-UP VISIT: CPT | Performed by: PODIATRIST

## 2018-04-19 PROCEDURE — 99212 OFFICE O/P EST SF 10 MIN: CPT | Performed by: PODIATRIST

## 2018-04-19 NOTE — PROGRESS NOTES
HPI:    Patient ID: Cassia Navas is a 78year old female. HPI  This 61-year-old female presents postsurgical at approximately 1 month. She has no specific noted complaints.   The pin is loose in the second toe and easily removed today without incide

## 2018-04-20 NOTE — TELEPHONE ENCOUNTER
Dr Mila Frye,    Pt will have been on Remicade for a year this May. She is wondering if she is supposed to continue on the Remicade after the one year liana. Also she is having a mild UC flare    She believes it it was triggered by stress.      The amount and

## 2018-04-23 NOTE — TELEPHONE ENCOUNTER
Please see below. I recommend continued infusions of hydrocortisone at each remicaide infusion. Remicaide infusions should continue , unless otherwise recommended by Dr. Yohana Calhoun at [facility].  Please inform patient

## 2018-05-17 ENCOUNTER — OFFICE VISIT (OUTPATIENT)
Dept: PODIATRY CLINIC | Facility: CLINIC | Age: 68
End: 2018-05-17

## 2018-05-17 DIAGNOSIS — M21.611 BILATERAL BUNIONS: Primary | ICD-10-CM

## 2018-05-17 DIAGNOSIS — M21.612 BILATERAL BUNIONS: Primary | ICD-10-CM

## 2018-05-17 DIAGNOSIS — M20.41 HAMMER TOE OF RIGHT FOOT: ICD-10-CM

## 2018-05-17 PROCEDURE — 99212 OFFICE O/P EST SF 10 MIN: CPT | Performed by: PODIATRIST

## 2018-05-17 PROCEDURE — 99024 POSTOP FOLLOW-UP VISIT: CPT | Performed by: PODIATRIST

## 2018-05-17 NOTE — PROGRESS NOTES
HPI:    Patient ID: Letty Olsen is a 78year old female. HPI  72-year-old female presents 2 months post right forefoot surgery. She has no specific complaints and is very happy.   Review of Systems         Current Outpatient Prescriptions:  milad

## 2018-05-30 ENCOUNTER — OFFICE VISIT (OUTPATIENT)
Dept: HEMATOLOGY/ONCOLOGY | Facility: HOSPITAL | Age: 68
End: 2018-05-30
Attending: INTERNAL MEDICINE
Payer: COMMERCIAL

## 2018-05-30 VITALS
HEART RATE: 65 BPM | TEMPERATURE: 99 F | SYSTOLIC BLOOD PRESSURE: 95 MMHG | WEIGHT: 155 LBS | BODY MASS INDEX: 24 KG/M2 | RESPIRATION RATE: 16 BRPM | DIASTOLIC BLOOD PRESSURE: 61 MMHG

## 2018-05-30 DIAGNOSIS — K51.011 ULCERATIVE CHRONIC PANCOLITIS WITH RECTAL BLEEDING (HCC): Primary | ICD-10-CM

## 2018-05-30 DIAGNOSIS — K51.011 ULCERATIVE PANCOLITIS WITH RECTAL BLEEDING (HCC): ICD-10-CM

## 2018-05-30 PROCEDURE — A4216 STERILE WATER/SALINE, 10 ML: HCPCS

## 2018-05-30 PROCEDURE — 96413 CHEMO IV INFUSION 1 HR: CPT

## 2018-05-30 PROCEDURE — 96375 TX/PRO/DX INJ NEW DRUG ADDON: CPT

## 2018-05-30 PROCEDURE — 96415 CHEMO IV INFUSION ADDL HR: CPT

## 2018-05-30 RX ORDER — SODIUM CHLORIDE 9 MG/ML
INJECTION, SOLUTION INTRAVENOUS
Status: DISCONTINUED
Start: 2018-05-30 | End: 2018-05-30

## 2018-05-30 RX ORDER — 0.9 % SODIUM CHLORIDE 0.9 %
VIAL (ML) INJECTION
Status: DISCONTINUED
Start: 2018-05-30 | End: 2018-05-30

## 2018-05-30 RX ORDER — ACETAMINOPHEN 325 MG/1
650 TABLET ORAL ONCE
Status: CANCELLED | OUTPATIENT
Start: 2018-05-30

## 2018-05-30 RX ORDER — ACETAMINOPHEN 325 MG/1
TABLET ORAL
Status: COMPLETED
Start: 2018-05-30 | End: 2018-05-30

## 2018-05-30 RX ORDER — ACETAMINOPHEN 325 MG/1
650 TABLET ORAL ONCE
Status: COMPLETED | OUTPATIENT
Start: 2018-05-30 | End: 2018-05-30

## 2018-05-30 RX ADMIN — ACETAMINOPHEN 650 MG: 325 TABLET ORAL at 16:11:00

## 2018-05-30 NOTE — PROGRESS NOTES
Madelyn to infusion today for Q8 week Remicade. She arrives alert and independent. She denies abdominal pain. Does state she had some blood with her stools for 5 weeks and Dr. Kirstie Ramírez is aware. She currently denies blood in her stool.   No changes in medicati

## 2018-05-31 ENCOUNTER — TELEPHONE (OUTPATIENT)
Dept: GASTROENTEROLOGY | Facility: CLINIC | Age: 68
End: 2018-05-31

## 2018-05-31 NOTE — TELEPHONE ENCOUNTER
Refer to 5/21 email. Dr Eladio Alvarenga had signed off on Remicade 5 mg/kg every 8 weeks order sheet (renewal). Per infusion center, pt is also receiving Hydrocortisone 200 mg IV prior to each Remicade infusion.  I was able to find scanned hydrocortisone order from 2/

## 2018-06-04 RX ORDER — MESALAMINE 400 MG/1
CAPSULE, DELAYED RELEASE ORAL
Qty: 180 CAPSULE | Refills: 0 | OUTPATIENT
Start: 2018-06-04

## 2018-06-07 RX ORDER — MESALAMINE 400 MG/1
CAPSULE, DELAYED RELEASE ORAL
Qty: 180 CAPSULE | Refills: 0 | OUTPATIENT
Start: 2018-06-07

## 2018-06-08 RX ORDER — MESALAMINE 400 MG/1
800 CAPSULE, DELAYED RELEASE ORAL
Qty: 180 CAPSULE | Refills: 11 | Status: SHIPPED | OUTPATIENT
Start: 2018-06-08 | End: 2019-01-25

## 2018-06-08 NOTE — TELEPHONE ENCOUNTER
[facility]-requesting refill for Rx mesalamine (DELZICOL) .  Thank you Fax:[phone]          Current Outpatient Prescriptions:   •  mesalamine (DELZICOL) 400 MG Oral Capsule Delayed Release, Take 2 capsules (800 mg total) by mouth 3 (thr

## 2018-06-12 NOTE — TELEPHONE ENCOUNTER
Per [facility], predetermination needs to be done remicade infusion. P[facility]y will fax order/predetermination request to this office (she already filled out request) but she needs clinical notes and request faxed to [facility].     Infusion can't be scheduled

## 2018-06-13 NOTE — TELEPHONE ENCOUNTER
Dr Steve Mckeon,    I spoke to [facility] at the [facility] and she states she needs a recent office visit to send in with the prior auth from the ordering physician. I can call the pt and have her come in for an OV but you have no current openings.      Please advis

## 2018-06-13 NOTE — TELEPHONE ENCOUNTER
I received the predetermination from [facility] at the [facility] via fax. I  sent the predetermination for the Remicade along with Dr Gilberto cheng from the [facility] to [employer] at [phone] as an URGENT request. Will await answer from [employer].  Patsy wheeler

## 2018-06-13 NOTE — TELEPHONE ENCOUNTER
Please send them a copy of the office visit from the [facility] doctor, Dr. Saeed Guzman. She should make a return appointment to see me within the next 2-3 months.

## 2018-06-14 ENCOUNTER — OFFICE VISIT (OUTPATIENT)
Dept: PODIATRY CLINIC | Facility: CLINIC | Age: 68
End: 2018-06-14

## 2018-06-14 DIAGNOSIS — M20.41 HAMMER TOE OF RIGHT FOOT: ICD-10-CM

## 2018-06-14 DIAGNOSIS — M21.611 BILATERAL BUNIONS: Primary | ICD-10-CM

## 2018-06-14 DIAGNOSIS — M21.612 BILATERAL BUNIONS: Primary | ICD-10-CM

## 2018-06-14 PROCEDURE — 99024 POSTOP FOLLOW-UP VISIT: CPT | Performed by: PODIATRIST

## 2018-06-14 PROCEDURE — 99212 OFFICE O/P EST SF 10 MIN: CPT | Performed by: PODIATRIST

## 2018-06-14 RX ORDER — PENICILLIN V POTASSIUM 500 MG/1
TABLET ORAL
Refills: 0 | Status: ON HOLD | COMMUNITY
Start: 2018-06-06 | End: 2019-11-07

## 2018-06-14 NOTE — PROGRESS NOTES
HPI:    Patient ID: Sanchez Smith is a 78year old female. HPI  This 58-year-old female presents postsurgical with no specific noted complaints or concerns.   Patient states that she is doing all that she wants to do when wearing closed shoes comforta not have any known back related concerns. I am not certain of the cause and at this point I added vitamin B6 to her list of medications over the next 6-8 weeks. If unresolved at that time I encouraged patient to have an EMG study.   She is informed and un

## 2018-06-18 NOTE — TELEPHONE ENCOUNTER
SIMONE RN: please call in Rx s as per pt request:    Your message of 6/8 indicates  \"The [address] [phone], [phone] [[phone]]:   - laurel

## 2018-06-19 RX ORDER — MESALAMINE 400 MG/1
CAPSULE, DELAYED RELEASE ORAL
Qty: 180 CAPSULE | Refills: 3 | Status: SHIPPED | OUTPATIENT
Start: 2018-06-19 | End: 2018-10-29

## 2018-06-20 NOTE — TELEPHONE ENCOUNTER
I spoke to [facility] at the [facility].  She is going to f/u today on the predetermination and call me back with an update

## 2018-06-27 ENCOUNTER — TELEPHONE (OUTPATIENT)
Dept: GASTROENTEROLOGY | Facility: CLINIC | Age: 68
End: 2018-06-27

## 2018-06-27 NOTE — TELEPHONE ENCOUNTER
Patsy from [facility] called to speak clementine santillan attempt to transfer and was told kandy Burden was out of office .  Patsy would like a call back [phone] thank you

## 2018-06-27 NOTE — TELEPHONE ENCOUNTER
Annemarie states prior authorization  still pending on Remicade. They will call her when they have an update.

## 2018-07-03 NOTE — TELEPHONE ENCOUNTER
I spoke to [facility] at the [facility]. She will call me when she receives the approval for the Remicade.  She was told the Remicade was approved and she is waiting for a fax    Future Appointments  Date Time Provider Nadine Serrano   7/25/2018 4:00 PM EM

## 2018-07-03 NOTE — TELEPHONE ENCOUNTER
[facility] has questions re: remicade infusion and offices notes. She also states she is returning a call from [facility]. Please call.

## 2018-07-12 NOTE — TELEPHONE ENCOUNTER
I spoke to the patient. 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 Dr. Indira Whitney is advising Remicade. The patient is having pain and requests starting the Remicade sooner. We discussed that this is not possible.   We d Mohs Method Verbiage: An incision at a 90 degree angle following the standard Mohs approach was done and the specimen was harvested as a microscopic controlled layer.

## 2018-07-20 NOTE — TELEPHONE ENCOUNTER
Pt's Remicade is approved. Pt has next appt scheduled.     Future Appointments  Date Time Provider Nadine Serrano   7/25/2018 4:00 PM EM CC INFRN 2 [facility] CHEMO EMO   7/30/2018 12:00 PM [facility] DIGITAL MAMMO Doloris Blight [phone]

## 2018-07-24 ENCOUNTER — PATIENT MESSAGE (OUTPATIENT)
Dept: GASTROENTEROLOGY | Facility: CLINIC | Age: 68
End: 2018-07-24

## 2018-07-24 ENCOUNTER — APPOINTMENT (OUTPATIENT)
Dept: LAB | Facility: HOSPITAL | Age: 68
End: 2018-07-24
Attending: INTERNAL MEDICINE
Payer: COMMERCIAL

## 2018-07-24 DIAGNOSIS — K50.111 CROHN'S DISEASE OF COLON WITH RECTAL BLEEDING (HCC): ICD-10-CM

## 2018-07-24 DIAGNOSIS — K50.111 CROHN'S DISEASE OF COLON WITH RECTAL BLEEDING (HCC): Primary | ICD-10-CM

## 2018-07-24 LAB
BASOPHILS # BLD: 0 K/UL (ref 0–0.2)
BASOPHILS NFR BLD: 1 %
CRP SERPL-MCNC: 0.6 MG/DL (ref 0–0.9)
EOSINOPHIL # BLD: 0.2 K/UL (ref 0–0.7)
EOSINOPHIL NFR BLD: 4 %
ERYTHROCYTE [DISTWIDTH] IN BLOOD BY AUTOMATED COUNT: 13.8 % (ref 11–15)
ERYTHROCYTE [SEDIMENTATION RATE] IN BLOOD: 46 MM/HR (ref 0–30)
HCT VFR BLD AUTO: 35.9 % (ref 35–48)
HGB BLD-MCNC: 12.1 G/DL (ref 12–16)
LYMPHOCYTES # BLD: 1.5 K/UL (ref 1–4)
LYMPHOCYTES NFR BLD: 32 %
MCH RBC QN AUTO: 31.2 PG (ref 27–32)
MCHC RBC AUTO-ENTMCNC: 33.7 G/DL (ref 32–37)
MCV RBC AUTO: 92.7 FL (ref 80–100)
MONOCYTES # BLD: 0.6 K/UL (ref 0–1)
MONOCYTES NFR BLD: 13 %
NEUTROPHILS # BLD AUTO: 2.4 K/UL (ref 1.8–7.7)
NEUTROPHILS NFR BLD: 51 %
PLATELET # BLD AUTO: 247 K/UL (ref 140–400)
PMV BLD AUTO: 9 FL (ref 7.4–10.3)
RBC # BLD AUTO: 3.87 M/UL (ref 3.7–5.4)
WBC # BLD AUTO: 4.6 K/UL (ref 4–11)

## 2018-07-24 PROCEDURE — 87493 C DIFF AMPLIFIED PROBE: CPT

## 2018-07-24 PROCEDURE — 85025 COMPLETE CBC W/AUTO DIFF WBC: CPT

## 2018-07-24 PROCEDURE — 85652 RBC SED RATE AUTOMATED: CPT

## 2018-07-24 PROCEDURE — 86140 C-REACTIVE PROTEIN: CPT

## 2018-07-24 PROCEDURE — 36415 COLL VENOUS BLD VENIPUNCTURE: CPT

## 2018-07-24 NOTE — TELEPHONE ENCOUNTER
I spoke to the pt and advised her that Dr Tamia Hoyt ordered blood work to check for infection and a c diff stool culture.  The pt verbalizes understanding and will go to the lab now

## 2018-07-24 NOTE — TELEPHONE ENCOUNTER
Dr Vangie Perera,    I spoke to Ardyce Dance over the phone    She does not have a fever    She is eating    He stools are more frequent    She is experiencing a little bit of abdominal pain. She wants to know if it's ok to proceed with the Remicade tomorrow.     Ple

## 2018-07-24 NOTE — TELEPHONE ENCOUNTER
From: Shae Lerner  To: Dulce High MD  Sent: 7/24/2018 10:12 AM CDT  Subject: Other    I’m scheduled for my bimonthly remicade treatment tomorrow afternoon.   Blease Fire been feeling a little sick for the last 2 days – bordering nausea & have

## 2018-07-25 ENCOUNTER — OFFICE VISIT (OUTPATIENT)
Dept: HEMATOLOGY/ONCOLOGY | Facility: HOSPITAL | Age: 68
End: 2018-07-25
Attending: INTERNAL MEDICINE
Payer: COMMERCIAL

## 2018-07-25 ENCOUNTER — PATIENT MESSAGE (OUTPATIENT)
Dept: GASTROENTEROLOGY | Facility: CLINIC | Age: 68
End: 2018-07-25

## 2018-07-25 VITALS
BODY MASS INDEX: 23.04 KG/M2 | HEIGHT: 68 IN | HEART RATE: 74 BPM | WEIGHT: 152 LBS | DIASTOLIC BLOOD PRESSURE: 87 MMHG | SYSTOLIC BLOOD PRESSURE: 114 MMHG | RESPIRATION RATE: 16 BRPM | TEMPERATURE: 99 F

## 2018-07-25 DIAGNOSIS — K51.011 ULCERATIVE PANCOLITIS WITH RECTAL BLEEDING (HCC): Primary | ICD-10-CM

## 2018-07-25 PROCEDURE — 96413 CHEMO IV INFUSION 1 HR: CPT

## 2018-07-25 PROCEDURE — 96375 TX/PRO/DX INJ NEW DRUG ADDON: CPT

## 2018-07-25 PROCEDURE — A4216 STERILE WATER/SALINE, 10 ML: HCPCS

## 2018-07-25 PROCEDURE — 96415 CHEMO IV INFUSION ADDL HR: CPT

## 2018-07-25 RX ORDER — DIPHENHYDRAMINE HYDROCHLORIDE 50 MG/ML
25 INJECTION INTRAMUSCULAR; INTRAVENOUS ONCE
Status: CANCELLED | OUTPATIENT
Start: 2018-07-25

## 2018-07-25 RX ORDER — DIPHENHYDRAMINE HCL 25 MG
25 CAPSULE ORAL ONCE
Status: CANCELLED | OUTPATIENT
Start: 2018-07-25

## 2018-07-25 RX ORDER — ACETAMINOPHEN 325 MG/1
650 TABLET ORAL ONCE
Status: CANCELLED | OUTPATIENT
Start: 2018-07-25

## 2018-07-25 RX ORDER — DIPHENHYDRAMINE HCL 25 MG
25 CAPSULE ORAL ONCE
Status: DISCONTINUED | OUTPATIENT
Start: 2018-07-25 | End: 2018-07-25

## 2018-07-25 RX ORDER — ACETAMINOPHEN 325 MG/1
650 TABLET ORAL ONCE
Status: COMPLETED | OUTPATIENT
Start: 2018-07-25 | End: 2018-07-25

## 2018-07-25 RX ORDER — 0.9 % SODIUM CHLORIDE 0.9 %
VIAL (ML) INJECTION
Status: DISCONTINUED
Start: 2018-07-25 | End: 2018-07-25

## 2018-07-25 RX ORDER — ACETAMINOPHEN 325 MG/1
TABLET ORAL
Status: COMPLETED
Start: 2018-07-25 | End: 2018-07-25

## 2018-07-25 RX ORDER — SODIUM CHLORIDE 9 MG/ML
INJECTION, SOLUTION INTRAVENOUS
Status: DISCONTINUED
Start: 2018-07-25 | End: 2018-07-25

## 2018-07-25 RX ADMIN — ACETAMINOPHEN 650 MG: 325 TABLET ORAL at 16:24:00

## 2018-07-25 NOTE — PROGRESS NOTES
Madelyn to infusion today for Q8 week Remicade. Pt states she has not felt good in the past two days, nausea, 5-8 stools a day, blood present. Dr. Lien Ruiz is aware and there is a telephone encounter in epic, ok to proceed with Infusion.  Stool for C Diff is p

## 2018-07-25 NOTE — TELEPHONE ENCOUNTER
Await stool sample. Sed rate elevated but much less so than previous. CRP normal.  Please tell patient to proceed with infliximab infusion today.

## 2018-07-25 NOTE — TELEPHONE ENCOUNTER
Pt states she is experiencing symptoms cramping and nausea and also  has concerns about Remicade treatment that's scheduled for today .  Please call

## 2018-07-25 NOTE — TELEPHONE ENCOUNTER
Dr Lucas Pablo,    I just spoke to [facility] over the phone    She states the symptoms she had this morning have now improved. Please check her my chart message as well from this morning    Sed rate and CRP are back.     Please advise if pt can proceed with Remicad

## 2018-07-26 LAB — C DIFF TOX B STL QL: POSITIVE

## 2018-07-26 RX ORDER — VANCOMYCIN HYDROCHLORIDE 125 MG/1
125 CAPSULE ORAL 4 TIMES DAILY
Qty: 56 CAPSULE | Refills: 0 | Status: SHIPPED | OUTPATIENT
Start: 2018-07-26 | End: 2018-09-19

## 2018-07-26 NOTE — TELEPHONE ENCOUNTER
Pt contacted and reviewed Dr. Tenzin Martinez message below, she verbalized understanding of all and will  the vanco from pharmacy today. States she has been on multiple ax recently for UTI, dental work, and surgery.  I reviewed she can also take OTC probiotic d

## 2018-07-26 NOTE — TELEPHONE ENCOUNTER
Result for C. difficile noted. Please call patient and tell her to start vancomycin 125 4 times daily. Order sent. Please also tell her to proceed with infliximab treatment.

## 2018-07-26 NOTE — TELEPHONE ENCOUNTER
She should take the vancomycin and the cramping should get better over the next 1-2 days. She can take OTC Tylenol for the cramping if necessary but I cannot give her antispasmodics during the acute phase.

## 2018-07-27 NOTE — TELEPHONE ENCOUNTER
Sent the following to pt via [employer]:    \"Good morning Melany Reeder,     After speaking with Dr. Mila Frye regarding your cramping, she states the following: \"She should take the vancomycin and the cramping should get better over the next 1-2 days.  She can take

## 2018-08-30 ENCOUNTER — TELEPHONE (OUTPATIENT)
Dept: NEUROLOGY | Facility: CLINIC | Age: 68
End: 2018-08-30

## 2018-08-31 ENCOUNTER — APPOINTMENT (OUTPATIENT)
Dept: LAB | Facility: HOSPITAL | Age: 68
End: 2018-08-31
Attending: Other
Payer: COMMERCIAL

## 2018-08-31 ENCOUNTER — OFFICE VISIT (OUTPATIENT)
Dept: NEUROLOGY | Facility: CLINIC | Age: 68
End: 2018-08-31
Payer: COMMERCIAL

## 2018-08-31 VITALS
SYSTOLIC BLOOD PRESSURE: 116 MMHG | WEIGHT: 152 LBS | DIASTOLIC BLOOD PRESSURE: 72 MMHG | BODY MASS INDEX: 23.04 KG/M2 | HEIGHT: 68 IN

## 2018-08-31 DIAGNOSIS — H53.2 DIPLOPIA: Primary | ICD-10-CM

## 2018-08-31 DIAGNOSIS — R20.0 NUMBNESS: ICD-10-CM

## 2018-08-31 DIAGNOSIS — H53.2 DIPLOPIA: ICD-10-CM

## 2018-08-31 LAB
ALBUMIN SERPL BCP-MCNC: 3.8 G/DL (ref 3.5–4.8)
ALP SERPL-CCNC: 53 U/L (ref 32–100)
ALT SERPL-CCNC: 25 U/L (ref 14–54)
AST SERPL-CCNC: 28 U/L (ref 15–41)
BILIRUB DIRECT SERPL-MCNC: 0.1 MG/DL (ref 0–0.2)
BILIRUB SERPL-MCNC: 0.5 MG/DL (ref 0.3–1.2)
HCYS SERPL-SCNC: 7.1 UMOL/L
HIV1+2 AB SERPL QL IA: NONREACTIVE
PROT SERPL-MCNC: 7.7 G/DL (ref 5.9–8.4)
VIT B12 SERPL-MCNC: 685 PG/ML (ref 181–914)

## 2018-08-31 PROCEDURE — 99204 OFFICE O/P NEW MOD 45 MIN: CPT | Performed by: OTHER

## 2018-08-31 PROCEDURE — 80076 HEPATIC FUNCTION PANEL: CPT | Performed by: OTHER

## 2018-08-31 PROCEDURE — 36415 COLL VENOUS BLD VENIPUNCTURE: CPT

## 2018-08-31 PROCEDURE — 82607 VITAMIN B-12: CPT | Performed by: OTHER

## 2018-08-31 PROCEDURE — 83519 RIA NONANTIBODY: CPT

## 2018-08-31 PROCEDURE — 86334 IMMUNOFIX E-PHORESIS SERUM: CPT

## 2018-08-31 PROCEDURE — 86255 FLUORESCENT ANTIBODY SCREEN: CPT

## 2018-08-31 PROCEDURE — 87389 HIV-1 AG W/HIV-1&-2 AB AG IA: CPT

## 2018-08-31 PROCEDURE — 83516 IMMUNOASSAY NONANTIBODY: CPT

## 2018-08-31 PROCEDURE — 83090 ASSAY OF HOMOCYSTEINE: CPT

## 2018-08-31 PROCEDURE — 84238 ASSAY NONENDOCRINE RECEPTOR: CPT

## 2018-08-31 NOTE — PROGRESS NOTES
Neurology Initial Visit     Referred By: Dr. Pugh ref.  provider found    Chief Complaint: Patient presents with:  Neurologic Problem: Patient presents for a neuro evaluation recommended by her ophthalmologist. She states he thought it was possible for the • Diabetes Father    • Pacemaker Father    • Heart Disease Father    • Melanoma Father    • Diabetes Mother      DM   • Cancer Mother      leukemia   • Cancer Maternal Grandmother      lung   • Cancer Paternal Grandmother      stomach   • Cancer Sister atraumatic  Eyes- No redness or swelling  ENT- Hearing intake, smell preserved, normal glutition  Neck- No masses or adenopathy  Cv: pulses were palpable and normal, no cyanosis or edema     Neurological:     Mental Status- Alert and oriented x3.   Normal a ALKPHOS 64 09/13/2014   ALB 4.0 02/19/2018    02/19/2018   K 3.7 02/19/2018    02/19/2018   CO2 28 02/19/2018      I have reviewed labs. Reviewed notes from ophthalmology      Assessment   1.  Diplopia  It is reasonable to consider myasthenia

## 2018-09-04 ENCOUNTER — TELEPHONE (OUTPATIENT)
Dept: OTHER | Age: 68
End: 2018-09-04

## 2018-09-04 LAB
ACETYLCHOLINE BINDING AB: 0 NMOL/L
ACETYLCHOLINE BLOCKING AB: 13 %
TITIN ANTIBODY: 0.29 IV

## 2018-09-04 NOTE — TELEPHONE ENCOUNTER
Dr. Parish Cardoza office stated they faxed over the office visit notes from 8/28/18. Please call them if you did not receive.     Maximilian Perez from Dr. Shaun Dorsey office, an ophthalmologist, called to inform Dr. Becky Fernández that the patient was seen on 8/28/18 and diagn

## 2018-09-05 ENCOUNTER — TELEPHONE (OUTPATIENT)
Dept: NEUROLOGY | Facility: CLINIC | Age: 68
End: 2018-09-05

## 2018-09-05 NOTE — TELEPHONE ENCOUNTER
I appreciate the call. I did not see the fax yet, but it is possible that it went right to scanning and has not been scanned yet. If possible, could his office refax today? We can keep a look out for it before it goes to scanning.   Fax#  [phone]

## 2018-09-05 NOTE — TELEPHONE ENCOUNTER
Called Dr. Amelie Lawton office and spoke to Shadi Patricia, asked if OV notes could be re-faxed,  Shadi Patricia verbalized understanding and stated that she will be re-faxing OV notes

## 2018-09-05 NOTE — TELEPHONE ENCOUNTER
----- Message from Augusto Ackerman MD sent at 9/5/2018  1:43 PM CDT -----  Please let the patient know that results of these particular lab tests so far were normal.    Thank you

## 2018-09-05 NOTE — TELEPHONE ENCOUNTER
Spoke to patient and notified her of below. She was understanding. She scheduled a follow-up on 9/21/18, but would like to know if follow-up is needed since the lab results were normal. Please advise.

## 2018-09-06 ENCOUNTER — MED REC SCAN ONLY (OUTPATIENT)
Dept: NEUROLOGY | Facility: CLINIC | Age: 68
End: 2018-09-06

## 2018-09-06 NOTE — TELEPHONE ENCOUNTER
I am not sure what to tumor that he is referring to, therefore I would suggest to ask him what imaging Acosta Booth wants for that.

## 2018-09-06 NOTE — TELEPHONE ENCOUNTER
Spoke to patient and notified her of below. She was understanding. She states that Dr. Deric Aviles had mentioned that it could be some type of tumor. She is wondering if she should get MRI of brain done before she follows up with Dr. Deric Aviles. Please advise.

## 2018-09-06 NOTE — TELEPHONE ENCOUNTER
I will suggest for her to follow with an ophthalmologist first, if they still strongly feel that it could be a neurological problem they can always send her back to us. Otherwise we can cancel that appointment.

## 2018-09-19 ENCOUNTER — OFFICE VISIT (OUTPATIENT)
Dept: HEMATOLOGY/ONCOLOGY | Facility: HOSPITAL | Age: 68
End: 2018-09-19
Attending: INTERNAL MEDICINE
Payer: COMMERCIAL

## 2018-09-19 VITALS
BODY MASS INDEX: 23 KG/M2 | TEMPERATURE: 98 F | WEIGHT: 150 LBS | DIASTOLIC BLOOD PRESSURE: 48 MMHG | RESPIRATION RATE: 16 BRPM | SYSTOLIC BLOOD PRESSURE: 95 MMHG | HEART RATE: 66 BPM

## 2018-09-19 DIAGNOSIS — K51.011 ULCERATIVE PANCOLITIS WITH RECTAL BLEEDING (HCC): Primary | ICD-10-CM

## 2018-09-19 PROCEDURE — A4216 STERILE WATER/SALINE, 10 ML: HCPCS

## 2018-09-19 PROCEDURE — 96413 CHEMO IV INFUSION 1 HR: CPT

## 2018-09-19 PROCEDURE — 96415 CHEMO IV INFUSION ADDL HR: CPT

## 2018-09-19 PROCEDURE — 96375 TX/PRO/DX INJ NEW DRUG ADDON: CPT

## 2018-09-19 RX ORDER — ACETAMINOPHEN 325 MG/1
650 TABLET ORAL ONCE
Status: CANCELLED | OUTPATIENT
Start: 2018-09-19

## 2018-09-19 RX ORDER — 0.9 % SODIUM CHLORIDE 0.9 %
VIAL (ML) INJECTION
Status: DISCONTINUED
Start: 2018-09-19 | End: 2018-09-19

## 2018-09-19 RX ORDER — DIPHENHYDRAMINE HCL 25 MG
25 CAPSULE ORAL ONCE
Status: DISCONTINUED | OUTPATIENT
Start: 2018-09-19 | End: 2018-09-19

## 2018-09-19 RX ORDER — DIPHENHYDRAMINE HYDROCHLORIDE 50 MG/ML
25 INJECTION INTRAMUSCULAR; INTRAVENOUS ONCE
Status: CANCELLED | OUTPATIENT
Start: 2018-09-19

## 2018-09-19 RX ORDER — ACETAMINOPHEN 325 MG/1
TABLET ORAL
Status: COMPLETED
Start: 2018-09-19 | End: 2018-09-19

## 2018-09-19 RX ORDER — DIPHENHYDRAMINE HCL 25 MG
25 CAPSULE ORAL ONCE
Status: CANCELLED | OUTPATIENT
Start: 2018-09-19

## 2018-09-19 RX ORDER — DIPHENHYDRAMINE HCL 25 MG
CAPSULE ORAL
Status: DISCONTINUED
Start: 2018-09-19 | End: 2018-09-19

## 2018-09-19 RX ORDER — ACETAMINOPHEN 325 MG/1
650 TABLET ORAL ONCE
Status: COMPLETED | OUTPATIENT
Start: 2018-09-19 | End: 2018-09-19

## 2018-09-19 RX ORDER — SODIUM CHLORIDE 9 MG/ML
INJECTION, SOLUTION INTRAVENOUS
Status: DISCONTINUED
Start: 2018-09-19 | End: 2018-09-19

## 2018-09-19 RX ADMIN — ACETAMINOPHEN 650 MG: 325 TABLET ORAL at 16:19:00

## 2018-09-19 NOTE — PROGRESS NOTES
Madelyn to infusion today for Q8 week Remicade. Pt states her bowel issues are stable now, no complaints of bloody stools. Pt is having some issues with her eye that is being evaluated by ophthalmologist.  No changes in medications or allergies.      Waylon

## 2018-09-26 ENCOUNTER — TELEPHONE (OUTPATIENT)
Dept: GASTROENTEROLOGY | Facility: CLINIC | Age: 68
End: 2018-09-26

## 2018-10-30 RX ORDER — MESALAMINE 400 MG/1
800 CAPSULE, DELAYED RELEASE ORAL
Qty: 180 CAPSULE | Refills: 11 | Status: SHIPPED | OUTPATIENT
Start: 2018-10-30 | End: 2019-01-24

## 2018-11-14 ENCOUNTER — OFFICE VISIT (OUTPATIENT)
Dept: HEMATOLOGY/ONCOLOGY | Facility: HOSPITAL | Age: 68
End: 2018-11-14
Attending: INTERNAL MEDICINE
Payer: COMMERCIAL

## 2018-11-14 VITALS
BODY MASS INDEX: 24 KG/M2 | SYSTOLIC BLOOD PRESSURE: 111 MMHG | TEMPERATURE: 98 F | DIASTOLIC BLOOD PRESSURE: 60 MMHG | HEART RATE: 69 BPM | RESPIRATION RATE: 16 BRPM | WEIGHT: 156 LBS

## 2018-11-14 DIAGNOSIS — K51.011 ULCERATIVE PANCOLITIS WITH RECTAL BLEEDING (HCC): Primary | ICD-10-CM

## 2018-11-14 PROCEDURE — 96413 CHEMO IV INFUSION 1 HR: CPT

## 2018-11-14 PROCEDURE — 96415 CHEMO IV INFUSION ADDL HR: CPT

## 2018-11-14 PROCEDURE — A4216 STERILE WATER/SALINE, 10 ML: HCPCS

## 2018-11-14 PROCEDURE — 96375 TX/PRO/DX INJ NEW DRUG ADDON: CPT

## 2018-11-14 RX ORDER — DIPHENHYDRAMINE HCL 25 MG
CAPSULE ORAL
Status: DISCONTINUED
Start: 2018-11-14 | End: 2018-11-14

## 2018-11-14 RX ORDER — DIPHENHYDRAMINE HCL 25 MG
25 CAPSULE ORAL ONCE
Status: CANCELLED | OUTPATIENT
Start: 2018-11-14

## 2018-11-14 RX ORDER — ACETAMINOPHEN 325 MG/1
650 TABLET ORAL ONCE
Status: COMPLETED | OUTPATIENT
Start: 2018-11-14 | End: 2018-11-14

## 2018-11-14 RX ORDER — 0.9 % SODIUM CHLORIDE 0.9 %
VIAL (ML) INJECTION
Status: DISCONTINUED
Start: 2018-11-14 | End: 2018-11-14

## 2018-11-14 RX ORDER — DIPHENHYDRAMINE HCL 25 MG
25 CAPSULE ORAL ONCE
Status: DISCONTINUED | OUTPATIENT
Start: 2018-11-14 | End: 2018-11-14

## 2018-11-14 RX ORDER — DIPHENHYDRAMINE HYDROCHLORIDE 50 MG/ML
25 INJECTION INTRAMUSCULAR; INTRAVENOUS ONCE
Status: CANCELLED | OUTPATIENT
Start: 2018-11-14

## 2018-11-14 RX ORDER — SODIUM CHLORIDE 9 MG/ML
INJECTION, SOLUTION INTRAVENOUS
Status: DISCONTINUED
Start: 2018-11-14 | End: 2018-11-14

## 2018-11-14 RX ORDER — ACETAMINOPHEN 325 MG/1
TABLET ORAL
Status: COMPLETED
Start: 2018-11-14 | End: 2018-11-14

## 2018-11-14 RX ORDER — ACETAMINOPHEN 325 MG/1
650 TABLET ORAL ONCE
Status: CANCELLED | OUTPATIENT
Start: 2018-11-14

## 2018-11-14 RX ADMIN — ACETAMINOPHEN 650 MG: 325 TABLET ORAL at 16:20:00

## 2018-11-14 NOTE — PROGRESS NOTES
Madelyn to infusion today for Q8 week Remicade. She arrives alert and independent. She denies abdominal pain and states her stools \"are as normal as normal can be. \"  No changes in medications or allergies. PIV started to right forearm x1 attempt.  PI

## 2018-12-31 ENCOUNTER — PATIENT MESSAGE (OUTPATIENT)
Dept: GASTROENTEROLOGY | Facility: CLINIC | Age: 68
End: 2018-12-31

## 2019-01-02 RX ORDER — BALSALAZIDE DISODIUM 750 MG/1
2250 CAPSULE ORAL 3 TIMES DAILY
Qty: 270 CAPSULE | Refills: 0 | Status: ON HOLD | OUTPATIENT
Start: 2019-01-02 | End: 2019-11-07

## 2019-01-02 NOTE — TELEPHONE ENCOUNTER
Sulfasalazine   Is a fair drug except for those allergic to sulfa which you are. Balsalazide may work as well as Leilani Carlos. It is worth a try. Stop Delzicol when you start balsalazide. I have prescribed it for you. GI RN, please inform the patient.

## 2019-01-02 NOTE — TELEPHONE ENCOUNTER
From: Brianna Owusu  To: Tono Issa MD  Sent: 12/31/2018 12:56 PM CST  Subject: Prescription Question    Insurance is going to affect my prescription for the upcoming year & I'd like to know how much longer I need to be taking Delzicol.

## 2019-01-07 NOTE — TELEPHONE ENCOUNTER
DR Mike Marr,    See Madelyn's note. She is requesting Sulfasalazine as an alternative.  She does not think she is allergic to Sulfa

## 2019-01-07 NOTE — TELEPHONE ENCOUNTER
I recommend that she start with balsalazide. Or keep on the Delzicol. Chronic sulfasalazine can also cause other problems such as folate deficiency and other reactions to the sulfa such as Ramirez-Wero syndrome.     Or if she could see Dr. Marcela Mcnally of all

## 2019-01-08 ENCOUNTER — TELEPHONE (OUTPATIENT)
Dept: HEMATOLOGY/ONCOLOGY | Facility: HOSPITAL | Age: 69
End: 2019-01-08

## 2019-01-08 NOTE — TELEPHONE ENCOUNTER
Cornel Lainez was scheduled for 1/9/19 for her Remicade, but due to financial reasons she wanted to make sure the cost before she proceeds, She contacted our billing and they gave her the cost. Which was costly she then contacted the  who offers

## 2019-01-09 ENCOUNTER — PATIENT MESSAGE (OUTPATIENT)
Dept: INTERNAL MEDICINE CLINIC | Facility: CLINIC | Age: 69
End: 2019-01-09

## 2019-01-09 ENCOUNTER — PATIENT MESSAGE (OUTPATIENT)
Dept: GASTROENTEROLOGY | Facility: CLINIC | Age: 69
End: 2019-01-09

## 2019-01-09 ENCOUNTER — TELEPHONE (OUTPATIENT)
Dept: GASTROENTEROLOGY | Facility: CLINIC | Age: 69
End: 2019-01-09

## 2019-01-09 ENCOUNTER — APPOINTMENT (OUTPATIENT)
Dept: HEMATOLOGY/ONCOLOGY | Facility: HOSPITAL | Age: 69
End: 2019-01-09
Attending: INTERNAL MEDICINE
Payer: COMMERCIAL

## 2019-01-09 ENCOUNTER — NURSE TRIAGE (OUTPATIENT)
Dept: INTERNAL MEDICINE CLINIC | Facility: CLINIC | Age: 69
End: 2019-01-09

## 2019-01-09 DIAGNOSIS — R19.7 DIARRHEA, UNSPECIFIED TYPE: Primary | ICD-10-CM

## 2019-01-09 RX ORDER — VANCOMYCIN HYDROCHLORIDE 125 MG/1
125 CAPSULE ORAL 4 TIMES DAILY
Qty: 84 CAPSULE | Refills: 0 | Status: SHIPPED | OUTPATIENT
Start: 2019-01-09 | End: 2019-05-08

## 2019-01-09 NOTE — TELEPHONE ENCOUNTER
I have explained to patient in the past not to take broad-spectrum antibiotics without vancomycin prophylaxis. Please tell her I sent a prescription for vancomycin for 2 weeks then tapering over the next 2 weeks.     Please call pharmacy to get long Si

## 2019-01-09 NOTE — TELEPHONE ENCOUNTER
I called the [facility] and spoke to [place] the registered pharmacist. I called in the Vancomycin as ordered by Dr Vivian Peterson. LM for the pt to call back    Vancomycin HCl 125 MG Oral Cap 84 capsule 0 1/9/2019     Sig - Route:  Take 1 capsule (125 mg total)

## 2019-01-09 NOTE — TELEPHONE ENCOUNTER
Meka Landa   to Nicole Rodriguez MD        1/9/19 9:14 AM   Dr Roseann Pérez thought I was having symptoms of the beginning of a UC flare but last night I had very watery stools & some stomach cramping - which I don't usually have with my flares. Aaliyah Garcia

## 2019-01-09 NOTE — TELEPHONE ENCOUNTER
I spoke to the pt. She is aware the Vancomycin has been called to the [facility]. She is going to ask the [facility] to transfer the prescription since she has moved to [place].  I took out the [employer] and replaced it with the appropriate W

## 2019-01-09 NOTE — TELEPHONE ENCOUNTER
Action Requested: Summary for Provider     []  Critical Lab, Recommendations Needed  [] Need Additional Advice  []   FYI    []   Need Orders  [] Need Medications Sent to Pharmacy  []  Other     SUMMARY: Patient following up to her mychart below, concerning

## 2019-01-09 NOTE — TELEPHONE ENCOUNTER
Regarding: Other  ----- Message from Zina Baldwin RN sent at 1/9/2019 12:21 PM CST -----       ----- Message from Elana Cabrera to Hipolito Stallworth MD sent at 1/9/2019  9:45 AM -----   Dr Elva Shoemaker,  I thought I was having symptoms of the

## 2019-01-09 NOTE — TELEPHONE ENCOUNTER
From: Victor Hugo Portillo  To: Xi Aguilar MD  Sent: 1/9/2019 9:45 AM CST  Subject: Other    Dr Devin Santiago, I thought I was having symptoms of the beginning of a UC flare but last night I had very watery stools & some stomach cramping - which I don'

## 2019-01-09 NOTE — TELEPHONE ENCOUNTER
Pt called to find out if she can get orders to test for C Dif. Pt is having soft watery stools and some stomach cramping. See also 1/9/19 pt email. Please call.

## 2019-01-10 ENCOUNTER — PATIENT MESSAGE (OUTPATIENT)
Dept: GASTROENTEROLOGY | Facility: CLINIC | Age: 69
End: 2019-01-10

## 2019-01-10 ENCOUNTER — TELEPHONE (OUTPATIENT)
Dept: OTHER | Age: 69
End: 2019-01-10

## 2019-01-10 NOTE — TELEPHONE ENCOUNTER
From: Alice Schroeder  To: Lloyd Donato MD  Sent: 1/9/2019 6:32 PM CST  Subject: Prescription Question    I'm requesting for your office to provide me with any /all upcoming prescription information so that I can call for costs.  The connie

## 2019-01-10 NOTE — TELEPHONE ENCOUNTER
Priti Singer requesting to speak with RN re: alternative meds, the generic, Azulfidine. PLs call. Thank you.

## 2019-01-10 NOTE — TELEPHONE ENCOUNTER
Please notify the pt that they cannot run the test on formed stool. If she has more liquid diarrhea, she can try to do the test again.

## 2019-01-10 NOTE — TELEPHONE ENCOUNTER
Manuel Chawla:    Unfortunately the cost of medications has gone up exponentially in the last several years and is a problem that is largely out of our hands. Compounding this are the many many insurance companies that we deal with.   I also have switched to Co

## 2019-01-10 NOTE — TELEPHONE ENCOUNTER
Received a call from [address] [place] [address] from the [facility] who reports the patient dropped off a stool sample for the C. Diff lab yesterday 1/9/19, but the stool is formed and the lab could not be processed. Routed to provider for review.      Please respond to pool

## 2019-01-11 ENCOUNTER — TELEPHONE (OUTPATIENT)
Dept: GASTROENTEROLOGY | Facility: CLINIC | Age: 69
End: 2019-01-11

## 2019-01-11 NOTE — TELEPHONE ENCOUNTER
I spoke to the pt over the phone. The c diff culture was unable to be run due to stool being formed. She is concerned about not knowing what is going on with her. He stools are still soft and she is cramping but not as much.  She is still pretty sure

## 2019-01-11 NOTE — TELEPHONE ENCOUNTER
Rosa Avitia RN   to [place] [facility]           1/10/19 10:35 PM   Jade Hawkins wanted you to know that the lab was not able to run the stool test because your stool was formed.  If you have more liquid diarrhea, you can try to redo the test.

## 2019-01-11 NOTE — TELEPHONE ENCOUNTER
Current Outpatient Medications:  Balsalazide Disodium 750 MG Oral Cap Take 3 capsules (2,250 mg total) by mouth 3 (three) times daily.  Disp: 270 capsule Rfl: 0     Pt requesting generic Azulfidine less expensive pls advise

## 2019-01-14 ENCOUNTER — PATIENT MESSAGE (OUTPATIENT)
Dept: GASTROENTEROLOGY | Facility: CLINIC | Age: 69
End: 2019-01-14

## 2019-01-15 ENCOUNTER — TELEPHONE (OUTPATIENT)
Dept: GASTROENTEROLOGY | Facility: CLINIC | Age: 69
End: 2019-01-15

## 2019-01-15 RX ORDER — DIPHENHYDRAMINE HCL 25 MG
25 CAPSULE ORAL ONCE
Status: CANCELLED | OUTPATIENT
Start: 2019-01-15

## 2019-01-15 RX ORDER — DIPHENHYDRAMINE HYDROCHLORIDE 50 MG/ML
25 INJECTION INTRAMUSCULAR; INTRAVENOUS ONCE
Status: CANCELLED | OUTPATIENT
Start: 2019-01-15

## 2019-01-15 RX ORDER — ACETAMINOPHEN 325 MG/1
650 TABLET ORAL ONCE
Status: CANCELLED | OUTPATIENT
Start: 2019-01-15

## 2019-01-15 NOTE — TELEPHONE ENCOUNTER
Pt has medication questions please call thank you       Current Outpatient Medications:     •  Balsalazide Disodium 750 MG Oral Cap, Take 3 capsules (2,250 mg total) by mouth 3 (three) times daily. , Disp: 270 capsule, Rfl: 0

## 2019-01-16 ENCOUNTER — OFFICE VISIT (OUTPATIENT)
Dept: HEMATOLOGY/ONCOLOGY | Facility: HOSPITAL | Age: 69
End: 2019-01-16
Attending: INTERNAL MEDICINE
Payer: COMMERCIAL

## 2019-01-16 VITALS
RESPIRATION RATE: 16 BRPM | BODY MASS INDEX: 24 KG/M2 | WEIGHT: 155 LBS | SYSTOLIC BLOOD PRESSURE: 110 MMHG | OXYGEN SATURATION: 96 % | TEMPERATURE: 98 F | HEART RATE: 71 BPM | DIASTOLIC BLOOD PRESSURE: 50 MMHG

## 2019-01-16 DIAGNOSIS — K51.011 ULCERATIVE PANCOLITIS WITH RECTAL BLEEDING (HCC): Primary | ICD-10-CM

## 2019-01-16 PROCEDURE — 96415 CHEMO IV INFUSION ADDL HR: CPT

## 2019-01-16 PROCEDURE — 96413 CHEMO IV INFUSION 1 HR: CPT

## 2019-01-16 PROCEDURE — 96375 TX/PRO/DX INJ NEW DRUG ADDON: CPT

## 2019-01-16 RX ORDER — SODIUM CHLORIDE 9 MG/ML
INJECTION, SOLUTION INTRAVENOUS
Status: DISCONTINUED
Start: 2019-01-16 | End: 2019-01-16

## 2019-01-16 RX ORDER — ACETAMINOPHEN 325 MG/1
TABLET ORAL
Status: COMPLETED
Start: 2019-01-16 | End: 2019-01-16

## 2019-01-16 RX ORDER — DIPHENHYDRAMINE HCL 25 MG
25 CAPSULE ORAL ONCE
Status: CANCELLED | OUTPATIENT
Start: 2019-01-16

## 2019-01-16 RX ORDER — ACETAMINOPHEN 325 MG/1
650 TABLET ORAL ONCE
Status: CANCELLED | OUTPATIENT
Start: 2019-01-16

## 2019-01-16 RX ORDER — DIPHENHYDRAMINE HYDROCHLORIDE 50 MG/ML
25 INJECTION INTRAMUSCULAR; INTRAVENOUS ONCE
Status: CANCELLED | OUTPATIENT
Start: 2019-01-16

## 2019-01-16 RX ORDER — ACETAMINOPHEN 325 MG/1
650 TABLET ORAL ONCE
Status: COMPLETED | OUTPATIENT
Start: 2019-01-16 | End: 2019-01-16

## 2019-01-16 RX ADMIN — ACETAMINOPHEN 650 MG: 325 TABLET ORAL at 17:07:00

## 2019-01-16 NOTE — PROGRESS NOTES
Madelyn to infusion today for week 8 Remicade. she is 1 week late due to insurance issues   She arrived ambulatory and states her ulcerative colitis is controlled , no flare ups.  she does report that she is currently taking Vancomycin for 1 week for suspec

## 2019-01-16 NOTE — TELEPHONE ENCOUNTER
I spoke to Dr Rogelio De La Torre. Per Dr Rogelio De La Torre: Primo Liu for the pt to receive Remicade, vancomycin and solumedrol at the same time.  I notified Vikki Vázquez at the [facility]

## 2019-01-16 NOTE — TELEPHONE ENCOUNTER
Marya from infusion center calling for update. Dr Lien Ruiz has not picked up message yet. I paged her. Pt is at infusion center and they don't want to have pharmacist mix remicade or premedicate pt if Remicade is to be held . Manny Courtney is at ext [phone].  Also atte

## 2019-01-16 NOTE — TELEPHONE ENCOUNTER
Pt states she has a remicade appointment today and has some medication questions and wants to make sure there is no interaction .  Pt is scheduled at 4pm

## 2019-01-16 NOTE — TELEPHONE ENCOUNTER
Dr Ca Black,    1) Neeru Whiteside wants to know if it's ok to be on the Vancomycin, Remicade and the Solumedrol at the same time. She has a Remicade appt today at 4 PM.    2) pt has another tooth extraction scheduled for next Tuesday the 22nd.  Can she take a broad s

## 2019-01-17 NOTE — TELEPHONE ENCOUNTER
I am answering non-urgent messages only, ( out of town); ok to take vancomycin, solumedrol and remicaide at the same time

## 2019-01-17 NOTE — TELEPHONE ENCOUNTER
Chart reviewed,  pt with a history of c dif. Is the pt on vanco orally now ? Generally I would advise that the pt take vanco during the broad spectrum antibiotic to decrease the risk of C dif.   I would overlap by 3 days  ( ie continue beyond the broad

## 2019-01-21 ENCOUNTER — PATIENT MESSAGE (OUTPATIENT)
Dept: GASTROENTEROLOGY | Facility: CLINIC | Age: 69
End: 2019-01-21

## 2019-01-21 NOTE — TELEPHONE ENCOUNTER
From: Donte Nunez  To: Akhil Erickson MD  Sent: 1/14/2019 5:17 PM CST  Subject: Yulia Paige been taking the vancomycin prescribed last week for suspected C-diff but since I never had full symptoms of this, should I continue to take?    I

## 2019-01-21 NOTE — TELEPHONE ENCOUNTER
Sana Benton:    Unfortunately the cost of medications has gone up exponentially in the last several years and is a problem that is largely out of our hands. Compounding this are the many many insurance companies that we deal with.   I also have switched to Co

## 2019-01-23 NOTE — TELEPHONE ENCOUNTER
Please tell the patient that she needs to take the entire 4-week prescription for vancomycin as prescribed or she is at high risk for getting recurrent C. difficile. She should continue taking her Remicade as prescribed.

## 2019-01-24 RX ORDER — MESALAMINE 400 MG/1
800 CAPSULE, DELAYED RELEASE ORAL
Qty: 180 CAPSULE | Refills: 11 | Status: SHIPPED | OUTPATIENT
Start: 2019-01-24 | End: 2020-04-15

## 2019-01-24 NOTE — TELEPHONE ENCOUNTER
Pt requesting refills on Mesalamine ( Delzicol ) 400 Mg oral capsules delayed released  Take 2 capsule ( 800 Mg ) by mouth 3 times daily before meals  LR: 10/30/17       Qty:180               Refills:11  LOV:  5/30/17  Last Colon 12/19/17   No Appt has bee

## 2019-01-25 RX ORDER — MESALAMINE 400 MG/1
800 CAPSULE, DELAYED RELEASE ORAL
Qty: 180 CAPSULE | Refills: 11 | Status: ON HOLD | OUTPATIENT
Start: 2019-01-25 | End: 2019-11-07

## 2019-01-25 NOTE — TELEPHONE ENCOUNTER
Dr Vini Anthony,    Can you please sign off this Delzicol order for the pt. The last prescription was Printed and was not mailed to the pt.     She would like this prescription to go to [facility] which I put in the computer

## 2019-01-25 NOTE — TELEPHONE ENCOUNTER
Telephone Information:   Work Phone [phone]   [employer] [phone]   Work Phone [phone]

## 2019-01-26 NOTE — TELEPHONE ENCOUNTER
From: Elisabet Harrell  To: Betty Alexander MD  Sent: 1/21/2019 1:15 PM CST  Subject: Prescription Question    Can you please call my Delzicol Rx in to [address] in [facility] at [phone] & I'll check to see if they will provide a reas

## 2019-03-06 ENCOUNTER — APPOINTMENT (OUTPATIENT)
Dept: HEMATOLOGY/ONCOLOGY | Facility: HOSPITAL | Age: 69
End: 2019-03-06
Attending: INTERNAL MEDICINE
Payer: COMMERCIAL

## 2019-03-08 NOTE — PROGRESS NOTES
Madelyn to infusion today for week 8 Remicade. She arrived ambulatory and states her ulcerative colitis is controlled , no flare ups.  Her bowels are normal, denies any abdominal pain  She reports having a lot of stress due to moving her father into a nurs (3) no apparent problem

## 2019-03-13 ENCOUNTER — OFFICE VISIT (OUTPATIENT)
Dept: HEMATOLOGY/ONCOLOGY | Facility: HOSPITAL | Age: 69
End: 2019-03-13
Attending: INTERNAL MEDICINE
Payer: COMMERCIAL

## 2019-03-13 VITALS
OXYGEN SATURATION: 96 % | HEART RATE: 66 BPM | DIASTOLIC BLOOD PRESSURE: 48 MMHG | TEMPERATURE: 98 F | WEIGHT: 154 LBS | SYSTOLIC BLOOD PRESSURE: 107 MMHG | BODY MASS INDEX: 23 KG/M2 | RESPIRATION RATE: 16 BRPM

## 2019-03-13 DIAGNOSIS — K51.011 ULCERATIVE PANCOLITIS WITH RECTAL BLEEDING (HCC): Primary | ICD-10-CM

## 2019-03-13 PROCEDURE — 96415 CHEMO IV INFUSION ADDL HR: CPT

## 2019-03-13 PROCEDURE — 96413 CHEMO IV INFUSION 1 HR: CPT

## 2019-03-13 PROCEDURE — 96375 TX/PRO/DX INJ NEW DRUG ADDON: CPT

## 2019-03-13 RX ORDER — ACETAMINOPHEN 325 MG/1
650 TABLET ORAL ONCE
Status: COMPLETED | OUTPATIENT
Start: 2019-03-13 | End: 2019-03-13

## 2019-03-13 RX ORDER — DIPHENHYDRAMINE HYDROCHLORIDE 50 MG/ML
25 INJECTION INTRAMUSCULAR; INTRAVENOUS ONCE
Status: CANCELLED | OUTPATIENT
Start: 2019-03-13

## 2019-03-13 RX ORDER — ACETAMINOPHEN 325 MG/1
TABLET ORAL
Status: COMPLETED
Start: 2019-03-13 | End: 2019-03-13

## 2019-03-13 RX ORDER — SODIUM CHLORIDE 9 MG/ML
INJECTION, SOLUTION INTRAVENOUS
Status: DISCONTINUED
Start: 2019-03-13 | End: 2019-03-13

## 2019-03-13 RX ORDER — 0.9 % SODIUM CHLORIDE 0.9 %
VIAL (ML) INJECTION
Status: DISCONTINUED
Start: 2019-03-13 | End: 2019-03-13

## 2019-03-13 RX ORDER — DIPHENHYDRAMINE HCL 25 MG
25 CAPSULE ORAL ONCE
Status: CANCELLED | OUTPATIENT
Start: 2019-03-13

## 2019-03-13 RX ORDER — ACETAMINOPHEN 325 MG/1
650 TABLET ORAL ONCE
Status: CANCELLED | OUTPATIENT
Start: 2019-03-13

## 2019-03-13 RX ADMIN — ACETAMINOPHEN 650 MG: 325 TABLET ORAL at 16:11:00

## 2019-03-13 NOTE — PROGRESS NOTES
Madelyn to infusion today for Q8 week Remicade. Pt states her bowel issues are stable now, no complaints of bloody stools. No changes in medications or allergies. Patient given pre-med of Tylenol 650mg po, as well as Solucortef 200mg IVP.  PIV started

## 2019-05-08 ENCOUNTER — OFFICE VISIT (OUTPATIENT)
Dept: HEMATOLOGY/ONCOLOGY | Facility: HOSPITAL | Age: 69
End: 2019-05-08
Attending: INTERNAL MEDICINE
Payer: COMMERCIAL

## 2019-05-08 VITALS
OXYGEN SATURATION: 97 % | WEIGHT: 154 LBS | TEMPERATURE: 98 F | DIASTOLIC BLOOD PRESSURE: 53 MMHG | HEART RATE: 58 BPM | SYSTOLIC BLOOD PRESSURE: 98 MMHG | RESPIRATION RATE: 16 BRPM | BODY MASS INDEX: 23.34 KG/M2 | HEIGHT: 68 IN

## 2019-05-08 DIAGNOSIS — K51.011 ULCERATIVE PANCOLITIS WITH RECTAL BLEEDING (HCC): Primary | ICD-10-CM

## 2019-05-08 PROCEDURE — 96375 TX/PRO/DX INJ NEW DRUG ADDON: CPT

## 2019-05-08 PROCEDURE — 96415 CHEMO IV INFUSION ADDL HR: CPT

## 2019-05-08 PROCEDURE — 96413 CHEMO IV INFUSION 1 HR: CPT

## 2019-05-08 RX ORDER — 0.9 % SODIUM CHLORIDE 0.9 %
VIAL (ML) INJECTION
Status: DISCONTINUED
Start: 2019-05-08 | End: 2019-05-08

## 2019-05-08 RX ORDER — DIPHENHYDRAMINE HYDROCHLORIDE 50 MG/ML
25 INJECTION INTRAMUSCULAR; INTRAVENOUS ONCE
Status: CANCELLED | OUTPATIENT
Start: 2019-05-08

## 2019-05-08 RX ORDER — DIPHENHYDRAMINE HCL 25 MG
25 CAPSULE ORAL ONCE
Status: CANCELLED | OUTPATIENT
Start: 2019-05-08

## 2019-05-08 RX ORDER — ACETAMINOPHEN 325 MG/1
TABLET ORAL
Status: COMPLETED
Start: 2019-05-08 | End: 2019-05-08

## 2019-05-08 RX ORDER — ACETAMINOPHEN 325 MG/1
650 TABLET ORAL ONCE
Status: CANCELLED | OUTPATIENT
Start: 2019-05-08

## 2019-05-08 RX ORDER — DIPHENHYDRAMINE HCL 25 MG
25 CAPSULE ORAL ONCE
Status: DISCONTINUED | OUTPATIENT
Start: 2019-05-08 | End: 2019-05-08

## 2019-05-08 RX ORDER — ACETAMINOPHEN 325 MG/1
650 TABLET ORAL ONCE
Status: COMPLETED | OUTPATIENT
Start: 2019-05-08 | End: 2019-05-08

## 2019-05-08 RX ORDER — SODIUM CHLORIDE 9 MG/ML
INJECTION, SOLUTION INTRAVENOUS
Status: DISCONTINUED
Start: 2019-05-08 | End: 2019-05-08

## 2019-05-08 RX ADMIN — ACETAMINOPHEN 650 MG: 325 TABLET ORAL at 16:37:00

## 2019-06-03 ENCOUNTER — TELEPHONE (OUTPATIENT)
Dept: GASTROENTEROLOGY | Facility: CLINIC | Age: 69
End: 2019-06-03

## 2019-06-03 ENCOUNTER — OFFICE VISIT (OUTPATIENT)
Dept: GASTROENTEROLOGY | Facility: CLINIC | Age: 69
End: 2019-06-03
Payer: COMMERCIAL

## 2019-06-03 ENCOUNTER — LAB ENCOUNTER (OUTPATIENT)
Dept: LAB | Facility: HOSPITAL | Age: 69
End: 2019-06-03
Attending: INTERNAL MEDICINE
Payer: COMMERCIAL

## 2019-06-03 VITALS
HEIGHT: 68 IN | SYSTOLIC BLOOD PRESSURE: 105 MMHG | HEART RATE: 61 BPM | DIASTOLIC BLOOD PRESSURE: 70 MMHG | WEIGHT: 154.38 LBS | BODY MASS INDEX: 23.4 KG/M2

## 2019-06-03 DIAGNOSIS — Z86.19 HISTORY OF CLOSTRIDIUM DIFFICILE COLITIS: Primary | ICD-10-CM

## 2019-06-03 DIAGNOSIS — R20.0 NUMBNESS OF TOES: ICD-10-CM

## 2019-06-03 DIAGNOSIS — R19.7 DIARRHEA OF PRESUMED INFECTIOUS ORIGIN: ICD-10-CM

## 2019-06-03 PROCEDURE — 99214 OFFICE O/P EST MOD 30 MIN: CPT | Performed by: INTERNAL MEDICINE

## 2019-06-03 PROCEDURE — 84443 ASSAY THYROID STIM HORMONE: CPT

## 2019-06-03 PROCEDURE — 85025 COMPLETE CBC W/AUTO DIFF WBC: CPT

## 2019-06-03 PROCEDURE — 36415 COLL VENOUS BLD VENIPUNCTURE: CPT

## 2019-06-03 PROCEDURE — 82746 ASSAY OF FOLIC ACID SERUM: CPT

## 2019-06-03 PROCEDURE — 83036 HEMOGLOBIN GLYCOSYLATED A1C: CPT

## 2019-06-03 PROCEDURE — 82607 VITAMIN B-12: CPT

## 2019-06-03 PROCEDURE — 99212 OFFICE O/P EST SF 10 MIN: CPT | Performed by: INTERNAL MEDICINE

## 2019-06-03 RX ORDER — POLYETHYLENE GLYCOL 3350, SODIUM CHLORIDE, SODIUM BICARBONATE, POTASSIUM CHLORIDE 420; 11.2; 5.72; 1.48 G/4L; G/4L; G/4L; G/4L
POWDER, FOR SOLUTION ORAL
Qty: 1 BOTTLE | Refills: 0 | Status: ON HOLD | OUTPATIENT
Start: 2019-06-03 | End: 2019-11-07

## 2019-06-03 NOTE — PATIENT INSTRUCTIONS
- Renewed medication  2. Ordered labs and c diff  3.  Plan colonoscopy in fall:    - Schedule colonoscopy with MAC at [facility] [Diagnosis: Ivonne Clayton up bowel prep from pharmacy (split prep)   - Continue all medications for procedure   - Read all

## 2019-06-03 NOTE — H&P
[facility], [facility] - Gastroenterology  Clinic History and Physical     Patient presents with:  Consult: to get med refill      HPI:   Saad Hickman is a 80year old year-old female with history of UC diagnosed in the 80s.   Patient was initially started winter and saw she has bone spurs. Early this year cdiff last year and early this year. Has some anal leakage. Goes daily and soft/solid. Denies any blood in the stool currently denies any abdominal pain or any pyoderma gangrenosum.         Prior endosc Sister         breast   • Breast Cancer Sister 62        age 62   • Heart Disorder Neg         Premature CAD, family h/o   • Colon Cancer Neg         Family h/o      Social History: Social History    Tobacco Use      Smoking status: Former Smoker        Ty RASH  Latex                       Comment:Per pt.  She has a sensitivity  Sulfa Antibiotics       PAIN    ROS:   CONSTITUTIONAL:  negative for fevers, rigors  EYES:  negative for diplopia   RESPIRATORY:  negative for severe shortness of breath  CAR GFRAA >60 02/19/2018    CA 9.2 02/19/2018    OSMOCALC 285 02/19/2018    ALKPHO 53 08/31/2018    AST 28 08/31/2018    ALT 25 08/31/2018    ALKPHOS 64 09/13/2014    BILT 0.5 08/31/2018    TP 7.7 08/31/2018    ALB 3.8 08/31/2018    GLOBULIN 3.3 02/19/2018 Imaging & Referrals:  None         Farzana Rachel MD  6/3/2019

## 2019-06-03 NOTE — TELEPHONE ENCOUNTER
Please make sure the infliximab is renewed and if not please let me know what dose and frequency she is on so that I can order. I do not want her to miss any doses.

## 2019-06-04 ENCOUNTER — TELEPHONE (OUTPATIENT)
Dept: GASTROENTEROLOGY | Facility: CLINIC | Age: 69
End: 2019-06-04

## 2019-06-04 DIAGNOSIS — K51.919 ULCERATIVE COLITIS WITH COMPLICATION, UNSPECIFIED LOCATION (HCC): Primary | ICD-10-CM

## 2019-06-04 NOTE — TELEPHONE ENCOUNTER
Scheduled for:  Colonoscopy 38810  Provider Name: Stevie Levy  Date:  11/7/19  Location:  [facility]  Sedation:  MAC  Time:  730am pt told to arrive at [address] [place] [address]  Meds/Allergies Reconciled?: physician reviewed   Diagnosis with codes:  Ulcerative coilitis K51. 9

## 2019-06-14 ENCOUNTER — TELEPHONE (OUTPATIENT)
Dept: GASTROENTEROLOGY | Facility: CLINIC | Age: 69
End: 2019-06-14

## 2019-06-14 RX ORDER — MESALAMINE 400 MG/1
800 CAPSULE, DELAYED RELEASE ORAL
Qty: 180 CAPSULE | Refills: 3 | Status: ON HOLD | OUTPATIENT
Start: 2019-06-14 | End: 2019-11-07

## 2019-06-14 NOTE — TELEPHONE ENCOUNTER
Mirela Alvarez:    I sent Venkata Head for the pt to her preferred pharmacy    OV 06/03/19      Medication Detail     Medication Quantity Refills Start End   mesalamine (DELZICOL) 400 MG Oral Capsule Delayed Release 180 capsule 3 6/14/2019    Sig:   Take 2 c

## 2019-06-14 NOTE — TELEPHONE ENCOUNTER
Pt states she has been waiting over a week for rx   Pharm told her they sent in request multiple times and have not heard response- pt is almost out of med     Current Outpatient Medications:              mesalamine (DELZICOL) 400 MG Oral Capsule Delayed R

## 2019-06-27 ENCOUNTER — TELEPHONE (OUTPATIENT)
Dept: GASTROENTEROLOGY | Facility: CLINIC | Age: 69
End: 2019-06-27

## 2019-06-27 NOTE — TELEPHONE ENCOUNTER
[facility] requesting OV notes, demographics and script for remicade including drug dose frequency and refills.  Please fax [phone]

## 2019-06-27 NOTE — TELEPHONE ENCOUNTER
Spoke to Rose Chino at Infusion center ([id number]) and states the now biologic infusions are sent to the pharmacist at the infusion center Jaison Pinzon.  She states we should fax the requested information to the pharmacy so that our infusion center may receive the med

## 2019-07-03 ENCOUNTER — APPOINTMENT (OUTPATIENT)
Dept: HEMATOLOGY/ONCOLOGY | Facility: HOSPITAL | Age: 69
End: 2019-07-03
Attending: INTERNAL MEDICINE
Payer: COMMERCIAL

## 2019-07-10 ENCOUNTER — OFFICE VISIT (OUTPATIENT)
Dept: HEMATOLOGY/ONCOLOGY | Facility: HOSPITAL | Age: 69
End: 2019-07-10
Attending: INTERNAL MEDICINE
Payer: COMMERCIAL

## 2019-07-10 ENCOUNTER — PATIENT MESSAGE (OUTPATIENT)
Dept: PODIATRY CLINIC | Facility: CLINIC | Age: 69
End: 2019-07-10

## 2019-07-10 VITALS
WEIGHT: 153 LBS | TEMPERATURE: 98 F | OXYGEN SATURATION: 96 % | SYSTOLIC BLOOD PRESSURE: 109 MMHG | HEART RATE: 60 BPM | DIASTOLIC BLOOD PRESSURE: 53 MMHG | BODY MASS INDEX: 23 KG/M2 | RESPIRATION RATE: 16 BRPM

## 2019-07-10 DIAGNOSIS — K51.011 ULCERATIVE PANCOLITIS WITH RECTAL BLEEDING (HCC): ICD-10-CM

## 2019-07-10 DIAGNOSIS — K51.019 CHRONIC ULCERATIVE PANCOLITIS WITH COMPLICATION (HCC): Primary | ICD-10-CM

## 2019-07-10 PROCEDURE — 96415 CHEMO IV INFUSION ADDL HR: CPT

## 2019-07-10 PROCEDURE — 96375 TX/PRO/DX INJ NEW DRUG ADDON: CPT

## 2019-07-10 PROCEDURE — 96413 CHEMO IV INFUSION 1 HR: CPT

## 2019-07-10 RX ORDER — DIPHENHYDRAMINE HYDROCHLORIDE 50 MG/ML
25 INJECTION INTRAMUSCULAR; INTRAVENOUS ONCE
Status: CANCELLED | OUTPATIENT
Start: 2019-09-04

## 2019-07-10 RX ORDER — DIPHENHYDRAMINE HYDROCHLORIDE 50 MG/ML
25 INJECTION INTRAMUSCULAR; INTRAVENOUS ONCE
Status: DISCONTINUED | OUTPATIENT
Start: 2019-07-10 | End: 2019-07-10

## 2019-07-10 RX ORDER — DIPHENHYDRAMINE HCL 25 MG
25 CAPSULE ORAL ONCE
Status: CANCELLED | OUTPATIENT
Start: 2019-09-04

## 2019-07-10 RX ORDER — ACETAMINOPHEN 325 MG/1
650 TABLET ORAL ONCE
Status: CANCELLED | OUTPATIENT
Start: 2019-09-04

## 2019-07-10 RX ORDER — ACETAMINOPHEN 325 MG/1
TABLET ORAL
Status: DISCONTINUED
Start: 2019-07-10 | End: 2019-07-10

## 2019-07-10 RX ORDER — ACETAMINOPHEN 325 MG/1
650 TABLET ORAL ONCE
Status: COMPLETED | OUTPATIENT
Start: 2019-07-10 | End: 2019-07-10

## 2019-07-10 RX ORDER — ACETAMINOPHEN 325 MG/1
650 TABLET ORAL AS NEEDED
Status: CANCELLED | OUTPATIENT
Start: 2019-09-04

## 2019-07-10 RX ORDER — DIPHENHYDRAMINE HCL 25 MG
25 CAPSULE ORAL ONCE
Status: DISCONTINUED | OUTPATIENT
Start: 2019-07-10 | End: 2019-07-10

## 2019-07-10 RX ADMIN — ACETAMINOPHEN 650 MG: 325 TABLET ORAL at 16:33:00

## 2019-07-10 NOTE — PROGRESS NOTES
Pt to infusion for q8 week Remicade to treat ulcerative colitis. Arrived ambulating independently. Feeling well, offers no complaints. Denies any fevers, chills or s/s of infection. Pre-medicated with Tylenol and solu-cortef as ordered.  Pt declined [employer]

## 2019-07-11 ENCOUNTER — PATIENT MESSAGE (OUTPATIENT)
Dept: GASTROENTEROLOGY | Facility: CLINIC | Age: 69
End: 2019-07-11

## 2019-07-11 NOTE — TELEPHONE ENCOUNTER
From: Dorita Frank  To: Yulissa Olson DPM  Sent: 7/10/2019 5:35 PM CDT  Subject: Visit Follow-up Question    I would like to schedule visit to discuss surgery for my left foot bunion.

## 2019-07-11 NOTE — TELEPHONE ENCOUNTER
From: Caitlyn Gonzalez  To: Kendall Matthews MD  Sent: 7/11/2019 12:43 PM CDT  Subject: Visit Follow-up Question    I had my remicade treatment yesterday (7/10) & was automatically rescheduled for the next one in exactly 8 weeks - Sept 4th.  Unfortunately this

## 2019-07-15 NOTE — TELEPHONE ENCOUNTER
Called the infusion center. Provided verbal permission for pt to have remicade infusion 1 week early. They would like it in writing and signed by MD. Cee Gerardoors form on your desk to sign.  Once signed please give to a GI staff member and we can fax to infusion

## 2019-08-28 ENCOUNTER — OFFICE VISIT (OUTPATIENT)
Dept: HEMATOLOGY/ONCOLOGY | Facility: HOSPITAL | Age: 69
End: 2019-08-28
Attending: INTERNAL MEDICINE
Payer: COMMERCIAL

## 2019-08-28 ENCOUNTER — TELEPHONE (OUTPATIENT)
Dept: PODIATRY CLINIC | Facility: CLINIC | Age: 69
End: 2019-08-28

## 2019-08-28 VITALS
BODY MASS INDEX: 23 KG/M2 | OXYGEN SATURATION: 97 % | SYSTOLIC BLOOD PRESSURE: 124 MMHG | RESPIRATION RATE: 16 BRPM | DIASTOLIC BLOOD PRESSURE: 63 MMHG | WEIGHT: 150 LBS | HEART RATE: 70 BPM | TEMPERATURE: 98 F

## 2019-08-28 DIAGNOSIS — K51.019 CHRONIC ULCERATIVE PANCOLITIS WITH COMPLICATION (HCC): Primary | ICD-10-CM

## 2019-08-28 DIAGNOSIS — K51.011 ULCERATIVE PANCOLITIS WITH RECTAL BLEEDING (HCC): ICD-10-CM

## 2019-08-28 PROCEDURE — 96415 CHEMO IV INFUSION ADDL HR: CPT

## 2019-08-28 PROCEDURE — 96375 TX/PRO/DX INJ NEW DRUG ADDON: CPT

## 2019-08-28 PROCEDURE — 96413 CHEMO IV INFUSION 1 HR: CPT

## 2019-08-28 RX ORDER — DIPHENHYDRAMINE HCL 25 MG
25 CAPSULE ORAL ONCE
Status: CANCELLED | OUTPATIENT
Start: 2019-09-04

## 2019-08-28 RX ORDER — DIPHENHYDRAMINE HCL 25 MG
25 CAPSULE ORAL ONCE
Status: DISCONTINUED | OUTPATIENT
Start: 2019-08-28 | End: 2019-08-28

## 2019-08-28 RX ORDER — DIPHENHYDRAMINE HYDROCHLORIDE 50 MG/ML
25 INJECTION INTRAMUSCULAR; INTRAVENOUS ONCE
Status: CANCELLED | OUTPATIENT
Start: 2019-09-04

## 2019-08-28 RX ORDER — ACETAMINOPHEN 325 MG/1
650 TABLET ORAL ONCE
Status: COMPLETED | OUTPATIENT
Start: 2019-08-28 | End: 2019-08-28

## 2019-08-28 RX ORDER — ACETAMINOPHEN 325 MG/1
TABLET ORAL
Status: COMPLETED
Start: 2019-08-28 | End: 2019-08-28

## 2019-08-28 RX ORDER — DIPHENHYDRAMINE HYDROCHLORIDE 50 MG/ML
INJECTION INTRAMUSCULAR; INTRAVENOUS
Status: DISCONTINUED
Start: 2019-08-28 | End: 2019-08-28 | Stop reason: WASHOUT

## 2019-08-28 RX ORDER — ACETAMINOPHEN 325 MG/1
650 TABLET ORAL AS NEEDED
Status: CANCELLED | OUTPATIENT
Start: 2019-09-04

## 2019-08-28 RX ORDER — ACETAMINOPHEN 325 MG/1
650 TABLET ORAL ONCE
Status: CANCELLED | OUTPATIENT
Start: 2019-09-04

## 2019-08-28 RX ADMIN — ACETAMINOPHEN 650 MG: 325 TABLET ORAL at 16:58:00

## 2019-08-28 NOTE — PROGRESS NOTES
Pt to infusion for Remicade to treat ulcerative colitis. She is early for infusion due to scheduling conflict . Approval given to receive infusion early   Arrived ambulating independently. Feeling well, offers no complaints.  Denies any fevers, chills or

## 2019-08-28 NOTE — TELEPHONE ENCOUNTER
Called and spoke to pt surgery is scheduled at [facility] on 11/20. Pt's PO appointments with  are scheduled on 12/05 and 12/12. Pt informed sx center will call the day before surgery with a time to arrive and all other instructions. All questions answered. Thank You.

## 2019-09-04 ENCOUNTER — APPOINTMENT (OUTPATIENT)
Dept: HEMATOLOGY/ONCOLOGY | Facility: HOSPITAL | Age: 69
End: 2019-09-04
Attending: INTERNAL MEDICINE
Payer: COMMERCIAL

## 2019-10-03 ENCOUNTER — OFFICE VISIT (OUTPATIENT)
Dept: PODIATRY CLINIC | Facility: CLINIC | Age: 69
End: 2019-10-03
Payer: COMMERCIAL

## 2019-10-03 ENCOUNTER — HOSPITAL ENCOUNTER (OUTPATIENT)
Dept: GENERAL RADIOLOGY | Age: 69
Discharge: HOME | End: 2019-10-03
Attending: PODIATRIST
Payer: COMMERCIAL

## 2019-10-03 DIAGNOSIS — M21.612 BUNION, LEFT FOOT: ICD-10-CM

## 2019-10-03 DIAGNOSIS — M79.672 LEFT FOOT PAIN: ICD-10-CM

## 2019-10-03 DIAGNOSIS — M21.611 BILATERAL BUNIONS: ICD-10-CM

## 2019-10-03 DIAGNOSIS — M21.612 BILATERAL BUNIONS: ICD-10-CM

## 2019-10-03 DIAGNOSIS — M21.612 BUNION, LEFT FOOT: Primary | ICD-10-CM

## 2019-10-03 PROCEDURE — 73630 X-RAY EXAM OF FOOT: CPT | Performed by: PODIATRIST

## 2019-10-03 PROCEDURE — 99213 OFFICE O/P EST LOW 20 MIN: CPT | Performed by: PODIATRIST

## 2019-10-03 NOTE — PROGRESS NOTES
HPI:    Patient ID: Caitlyn Gonzalez is a 77year old female. This 56-year-old female presents having not been seen in the office for a little more than 1 year.   She states that she is here because she would like to discuss and schedule surgery for her le Tape           RASH  Latex                       Comment:Per pt. She has a sensitivity  Sulfa Antibiotics       PAIN   PHYSICAL EXAM:     On physical exam pedal pulses are noted. There is no evidence of edema nor erythema.   There is no apparent neurologic

## 2019-10-04 ENCOUNTER — TELEPHONE (OUTPATIENT)
Dept: PODIATRY CLINIC | Facility: CLINIC | Age: 69
End: 2019-10-04

## 2019-10-04 NOTE — TELEPHONE ENCOUNTER
Pt requesting to speak with RN. Pt states she have her sx scheduled for 11/20 and she wants to schedule her post op now. Pt states she wanted to return to work a week after sx but knows Kristopher Fearing will be out of town.  Pt wants to speak with RN in regards to

## 2019-10-08 NOTE — TELEPHONE ENCOUNTER
S/w pt and she wanted 1st PO appt changed from 12/5 to 12/3. appt made 12/3 @ 11:40am at [facility].  She had no further q's

## 2019-10-23 ENCOUNTER — APPOINTMENT (OUTPATIENT)
Dept: HEMATOLOGY/ONCOLOGY | Facility: HOSPITAL | Age: 69
End: 2019-10-23
Attending: INTERNAL MEDICINE
Payer: COMMERCIAL

## 2019-10-29 ENCOUNTER — TELEPHONE (OUTPATIENT)
Dept: INTERNAL MEDICINE CLINIC | Facility: CLINIC | Age: 69
End: 2019-10-29

## 2019-10-29 NOTE — TELEPHONE ENCOUNTER
Pt scheduled appt through [employer] with following sx:    Visit Type: [address] [place], [place] [address]y 6 (2963)      12/9/2019   11:40 AM  40 mins. Ross Tom MD        Replaced by [facility]      Patient Comments:   toe numbness & Wellness form authorized     Please advis

## 2019-10-30 ENCOUNTER — OFFICE VISIT (OUTPATIENT)
Dept: HEMATOLOGY/ONCOLOGY | Facility: HOSPITAL | Age: 69
End: 2019-10-30
Attending: INTERNAL MEDICINE
Payer: COMMERCIAL

## 2019-10-30 VITALS
HEART RATE: 59 BPM | TEMPERATURE: 98 F | RESPIRATION RATE: 16 BRPM | HEIGHT: 68 IN | WEIGHT: 153 LBS | DIASTOLIC BLOOD PRESSURE: 61 MMHG | BODY MASS INDEX: 23.19 KG/M2 | OXYGEN SATURATION: 99 % | SYSTOLIC BLOOD PRESSURE: 112 MMHG

## 2019-10-30 DIAGNOSIS — K51.011 ULCERATIVE PANCOLITIS WITH RECTAL BLEEDING (HCC): ICD-10-CM

## 2019-10-30 DIAGNOSIS — K51.019 CHRONIC ULCERATIVE PANCOLITIS WITH COMPLICATION (HCC): Primary | ICD-10-CM

## 2019-10-30 PROCEDURE — 96413 CHEMO IV INFUSION 1 HR: CPT

## 2019-10-30 PROCEDURE — 96375 TX/PRO/DX INJ NEW DRUG ADDON: CPT

## 2019-10-30 PROCEDURE — 96415 CHEMO IV INFUSION ADDL HR: CPT

## 2019-10-30 RX ORDER — ACETAMINOPHEN 325 MG/1
650 TABLET ORAL AS NEEDED
Status: CANCELLED | OUTPATIENT
Start: 2019-12-25

## 2019-10-30 RX ORDER — ACETAMINOPHEN 325 MG/1
650 TABLET ORAL ONCE
Status: COMPLETED | OUTPATIENT
Start: 2019-10-30 | End: 2019-10-30

## 2019-10-30 RX ORDER — DIPHENHYDRAMINE HYDROCHLORIDE 50 MG/ML
25 INJECTION INTRAMUSCULAR; INTRAVENOUS ONCE
Status: CANCELLED | OUTPATIENT
Start: 2019-12-25

## 2019-10-30 RX ORDER — ACETAMINOPHEN 325 MG/1
TABLET ORAL
Status: COMPLETED
Start: 2019-10-30 | End: 2019-10-30

## 2019-10-30 RX ORDER — DIPHENHYDRAMINE HCL 25 MG
25 CAPSULE ORAL ONCE
Status: CANCELLED | OUTPATIENT
Start: 2019-12-25

## 2019-10-30 RX ORDER — ACETAMINOPHEN 325 MG/1
650 TABLET ORAL ONCE
Status: CANCELLED | OUTPATIENT
Start: 2019-12-25

## 2019-10-30 RX ADMIN — ACETAMINOPHEN 650 MG: 325 TABLET ORAL at 17:09:00

## 2019-10-30 NOTE — TELEPHONE ENCOUNTER
The patient stated she is scheduling  physical appointment; She has toe numbness which the doctor has already addressed and did not want to forget to mention to Dr. Galo Jeong again.     During the conversation she stated she is having foot surgery in [place]

## 2019-11-05 ENCOUNTER — OFFICE VISIT (OUTPATIENT)
Dept: DERMATOLOGY CLINIC | Facility: CLINIC | Age: 69
End: 2019-11-05
Payer: COMMERCIAL

## 2019-11-05 DIAGNOSIS — D23.30 BENIGN NEOPLASM OF SKIN OF FACE: ICD-10-CM

## 2019-11-05 DIAGNOSIS — D18.01 HEMANGIOMA OF SKIN AND SUBCUTANEOUS TISSUE: ICD-10-CM

## 2019-11-05 DIAGNOSIS — D22.70 MULTIPLE BENIGN NEVI OF UPPER AND LOWER EXTREMITIES, AND TRUNK: ICD-10-CM

## 2019-11-05 DIAGNOSIS — D22.5 MULTIPLE BENIGN NEVI OF UPPER AND LOWER EXTREMITIES, AND TRUNK: ICD-10-CM

## 2019-11-05 DIAGNOSIS — L82.1 SEBORRHEIC KERATOSES: Primary | ICD-10-CM

## 2019-11-05 DIAGNOSIS — L60.9 DISEASE OF NAIL: ICD-10-CM

## 2019-11-05 DIAGNOSIS — D23.4 BENIGN NEOPLASM OF SCALP AND SKIN OF NECK: ICD-10-CM

## 2019-11-05 DIAGNOSIS — D22.60 MULTIPLE BENIGN NEVI OF UPPER AND LOWER EXTREMITIES, AND TRUNK: ICD-10-CM

## 2019-11-05 DIAGNOSIS — L90.5 SCAR CONDITION AND FIBROSIS OF SKIN: ICD-10-CM

## 2019-11-05 PROCEDURE — 99213 OFFICE O/P EST LOW 20 MIN: CPT | Performed by: DERMATOLOGY

## 2019-11-05 NOTE — PROGRESS NOTES
HPI:     Chief Complaint     Full Skin Exam        HPI     Full Skin Exam      Additional comments: LOV 10/5/17. pt presenting today with full body skin check. Father had HX of Melanoma.  Denies personal HX          Last edited by Kimberli Braun MA on 72 (three) times daily. 270 capsule 0   • Calcium 250 MG Oral Cap Take 2 tablets by mouth 2 (two) times daily. 360 capsule 4   • Biotin 5000 MCG Sublingual SL Tab Place 1 capsule under the tongue daily.  90 tablet 4   • folic acid 1 MG Oral Tab Take 1 tablet ( History      Not on file    Social Needs      Financial resource strain: Not on file      Food insecurity:        Worry: Not on file        Inability: Not on file      Transportation needs:        Medical: Not on file        Non-medical: Not on file    Tob pacemaker: No        Pt has a defibrillator: No        Breast feeding: Not Asked        Reaction to local anesthetic: No    Social History Narrative      Not on file    Family History   Problem Relation Age of Onset   • Diabetes Father    • Pacemaker [facility] explained to patient that she will likely get more these with time  Disease of nail-consistent with pickers nails–behavior modification stressed. I did tell patient that the nails will likely grow out normally if she stops disrupting the matrix.   Scar con

## 2019-11-07 ENCOUNTER — ANESTHESIA (OUTPATIENT)
Dept: ENDOSCOPY | Facility: HOSPITAL | Age: 69
End: 2019-11-07
Payer: COMMERCIAL

## 2019-11-07 ENCOUNTER — ANESTHESIA EVENT (OUTPATIENT)
Dept: ENDOSCOPY | Facility: HOSPITAL | Age: 69
End: 2019-11-07
Payer: COMMERCIAL

## 2019-11-07 ENCOUNTER — HOSPITAL ENCOUNTER (OUTPATIENT)
Facility: HOSPITAL | Age: 69
Setting detail: HOSPITAL OUTPATIENT SURGERY
Discharge: HOME | End: 2019-11-07
Attending: INTERNAL MEDICINE | Admitting: INTERNAL MEDICINE
Payer: COMMERCIAL

## 2019-11-07 DIAGNOSIS — K51.919 ULCERATIVE COLITIS WITH COMPLICATION, UNSPECIFIED LOCATION (HCC): ICD-10-CM

## 2019-11-07 PROCEDURE — 0DBN8ZX EXCISION OF SIGMOID COLON, VIA NATURAL OR ARTIFICIAL OPENING ENDOSCOPIC, DIAGNOSTIC: ICD-10-PCS | Performed by: INTERNAL MEDICINE

## 2019-11-07 PROCEDURE — 0DBP8ZX EXCISION OF RECTUM, VIA NATURAL OR ARTIFICIAL OPENING ENDOSCOPIC, DIAGNOSTIC: ICD-10-PCS | Performed by: INTERNAL MEDICINE

## 2019-11-07 PROCEDURE — 0DBL8ZX EXCISION OF TRANSVERSE COLON, VIA NATURAL OR ARTIFICIAL OPENING ENDOSCOPIC, DIAGNOSTIC: ICD-10-PCS | Performed by: INTERNAL MEDICINE

## 2019-11-07 PROCEDURE — 45380 COLONOSCOPY AND BIOPSY: CPT | Performed by: INTERNAL MEDICINE

## 2019-11-07 PROCEDURE — 0DBK8ZX EXCISION OF ASCENDING COLON, VIA NATURAL OR ARTIFICIAL OPENING ENDOSCOPIC, DIAGNOSTIC: ICD-10-PCS | Performed by: INTERNAL MEDICINE

## 2019-11-07 PROCEDURE — 0DBM8ZX EXCISION OF DESCENDING COLON, VIA NATURAL OR ARTIFICIAL OPENING ENDOSCOPIC, DIAGNOSTIC: ICD-10-PCS | Performed by: INTERNAL MEDICINE

## 2019-11-07 PROCEDURE — 0DBH8ZX EXCISION OF CECUM, VIA NATURAL OR ARTIFICIAL OPENING ENDOSCOPIC, DIAGNOSTIC: ICD-10-PCS | Performed by: INTERNAL MEDICINE

## 2019-11-07 RX ORDER — SODIUM CHLORIDE, SODIUM LACTATE, POTASSIUM CHLORIDE, CALCIUM CHLORIDE 600; 310; 30; 20 MG/100ML; MG/100ML; MG/100ML; MG/100ML
INJECTION, SOLUTION INTRAVENOUS CONTINUOUS
Status: DISCONTINUED | OUTPATIENT
Start: 2019-11-07 | End: 2019-11-07

## 2019-11-07 RX ORDER — NALOXONE HYDROCHLORIDE 0.4 MG/ML
80 INJECTION, SOLUTION INTRAMUSCULAR; INTRAVENOUS; SUBCUTANEOUS AS NEEDED
Status: DISCONTINUED | OUTPATIENT
Start: 2019-11-07 | End: 2019-11-07

## 2019-11-07 RX ADMIN — SODIUM CHLORIDE, SODIUM LACTATE, POTASSIUM CHLORIDE, CALCIUM CHLORIDE: 600; 310; 30; 20 INJECTION, SOLUTION INTRAVENOUS at 08:03:00

## 2019-11-07 NOTE — OPERATIVE REPORT
COLONOSCOPY REPORT    Niall Lundberg     1950 Age 77year old   PCP Vinod Murphy MD Endoscopist Marcelina Holder MD     Date of procedure: 19    Procedure: Colonoscopy w/biopsies    Pre-operative diagnosis: surveillance for CRC in UC patient vascularity of the left colon. No active inflammation or friability noted    5. JIGAR: normal rectal tone, no masses palpated. Impression:   · Pseudopolyps and decreased vascularity  · No active colitis appreciated    Recommend:  · Await pathology.  The i

## 2019-11-07 NOTE — ANESTHESIA PREPROCEDURE EVALUATION
Anesthesia PreOp Note    HPI:     Sanchez Smith is a 77year old female who presents for preoperative consultation requested by: Cheikh Reilly MD    Date of Surgery: 11/7/2019    Procedure(s):  COLONOSCOPY  Indication: Ulcerative colitis with complicati [facility] MAIN OR   • BENIGN BIOPSY RIGHT  1967   • COLONOSCOPY  2014   • COLONOSCOPY N/A 12/29/2017    Performed by Malachi Mora MD at [facility] ENDOSCOPY   • HYSTERECTOMY  11/06/2017    TVH/BSO/vag vault suspension / post repair   • PUBO VAGINAL SLING infusion, , Intravenous, Continuous, Kevin Baca MD, Last Rate: 20 mL/hr at 11/07/19 0706    No current [facility]-ordered outpatient medications on file.         Latex                       Comment:Added based on information entered during log             entr Relationships      Social connections:        Talks on phone: Not on file        Gets together: Not on file        Attends Congregation service: Not on file        Active member of club or organization: Not on file        Attends meetings of clubs or [place] Cardiovascular - negative ROS and normal exam    Neuro/Psych - negative ROS     GI/Hepatic/Renal - negative ROS   (+) bowel prep    Comments: Ulcerative colitis    Endo/Other - negative ROS   Abdominal                Anesthesia Plan:   ASA:  2  Informed Co

## 2019-11-07 NOTE — ANESTHESIA POSTPROCEDURE EVALUATION
Patient: Froy Lainez    Procedure Summary     Date:  11/07/19 Room / Location:  [address] [place] [address] ENDOSCOPY 05 / [address] [place] [address] ENDOSCOPY    Anesthesia Start:  5987 Anesthesia Stop:  8260    Procedure:  COLONOSCOPY (N/A ) Diagnosis:       Ulcerative colitis with complication,

## 2019-11-07 NOTE — H&P
Pre Procedure History & Physical Examination    Patient Name: Froy Lainez  MRN: [id number]  [facility]: [id number]  YOB: 1950    Diagnosis: ulcerative colitis surveillance    mesalamine (DELZICOL) 400 MG Oral Capsule Delayed Release, Take 2 caps PAIN    Past Medical History:   Diagnosis Date   • Anesthesia complication    • Colitis    • Colon polyps    • Internal hemorrhoids 12/29/2017   • Lipid screening 3/16/2013    per NG   • Nasal fracture    • Osteoarthritis    • PONV (postoperative nause crushing sub-sternal chest pain  GASTROINTESTINAL:  see HPI  GENITOURINARY:  negative for dysuria or gross hematuria  INTEGUMENT/BREAST:  SKIN:  negative for jaundice   ALLERGIC/IMMUNOLOGIC:  negative for hay fever  ENDOCRINE:  negative for cold intoleranc

## 2019-11-08 VITALS
OXYGEN SATURATION: 94 % | SYSTOLIC BLOOD PRESSURE: 101 MMHG | RESPIRATION RATE: 18 BRPM | WEIGHT: 150 LBS | DIASTOLIC BLOOD PRESSURE: 60 MMHG | HEIGHT: 68 IN | HEART RATE: 59 BPM | BODY MASS INDEX: 22.73 KG/M2

## 2019-11-11 ENCOUNTER — LAB ENCOUNTER (OUTPATIENT)
Dept: LAB | Age: 69
End: 2019-11-11
Attending: INTERNAL MEDICINE
Payer: COMMERCIAL

## 2019-11-11 ENCOUNTER — OFFICE VISIT (OUTPATIENT)
Dept: INTERNAL MEDICINE CLINIC | Facility: CLINIC | Age: 69
End: 2019-11-11
Payer: COMMERCIAL

## 2019-11-11 VITALS
WEIGHT: 152 LBS | TEMPERATURE: 99 F | HEART RATE: 65 BPM | HEIGHT: 68 IN | SYSTOLIC BLOOD PRESSURE: 112 MMHG | BODY MASS INDEX: 23.04 KG/M2 | DIASTOLIC BLOOD PRESSURE: 65 MMHG

## 2019-11-11 DIAGNOSIS — R79.89 ABNORMAL THYROID BLOOD TEST: ICD-10-CM

## 2019-11-11 DIAGNOSIS — Z92.241 HISTORY OF RECENT STEROID USE: ICD-10-CM

## 2019-11-11 DIAGNOSIS — D84.9 IMMUNOSUPPRESSED STATUS (HCC): ICD-10-CM

## 2019-11-11 DIAGNOSIS — Z01.818 PRE-OP EXAM: Primary | ICD-10-CM

## 2019-11-11 DIAGNOSIS — Z01.818 PRE-OP TESTING: Primary | ICD-10-CM

## 2019-11-11 DIAGNOSIS — Z12.31 ENCOUNTER FOR SCREENING MAMMOGRAM FOR MALIGNANT NEOPLASM OF BREAST: ICD-10-CM

## 2019-11-11 DIAGNOSIS — K51.00 ULCERATIVE PANCOLITIS WITHOUT COMPLICATION (HCC): ICD-10-CM

## 2019-11-11 DIAGNOSIS — M21.612 BUNION OF GREAT TOE OF LEFT FOOT: ICD-10-CM

## 2019-11-11 PROBLEM — M21.611 BILATERAL BUNIONS: Status: RESOLVED | Noted: 2017-11-15 | Resolved: 2019-11-11

## 2019-11-11 PROCEDURE — 93005 ELECTROCARDIOGRAM TRACING: CPT

## 2019-11-11 PROCEDURE — 93010 ELECTROCARDIOGRAM REPORT: CPT | Performed by: INTERNAL MEDICINE

## 2019-11-11 PROCEDURE — 99214 OFFICE O/P EST MOD 30 MIN: CPT | Performed by: INTERNAL MEDICINE

## 2019-11-11 NOTE — PATIENT INSTRUCTIONS
Do fasting labs soon at approximately 8 am.  Fast for 12 hours. Water is okay. Walk in. Schedule a nurse visit for your pneumonia vaccine 1-2 weeks prior to a dose of Remicade.

## 2019-11-11 NOTE — PROGRESS NOTES
HPI:    Patient ID: Ijeoma Dalh is a 77year old female. Pt is here for pre-op physical for bunionectomy with Dr. Nora Pedroza at [place] on November 20, 2019. Review of Systems   Constitutional: Negative for chills, diaphoresis and fever.    HENT: Compression neuropathy     Internal hemorrhoids     Immunosuppressed status (HCC)     Disease of nail     Scar condition and fibrosis of skin         Current Outpatient Medications   Medication Sig Dispense Refill   • mesalamine (DELZICOL) 400 MG Oral Ca note and vitals reviewed. Constitutional: She is oriented to person, place, and time. She appears well-developed and well-nourished. No distress. HENT:   Head: Normocephalic and atraumatic.    Right Ear: Tympanic membrane and ear canal normal.   Left Ea it.  - [facility] 2D+3D SCREENING BILAT (CPT=77067/53606); Future    7. Abnormal thyroid blood test  I will recheck her thyroid test.  - TSH W REFLEX TO FREE T4; Future      ADDENDUM:  EKG:  NSR. Normal.  CBC, CMP unremarkable for surgery.   Cortisol level s

## 2019-11-13 ENCOUNTER — TELEPHONE (OUTPATIENT)
Dept: OTHER | Age: 69
End: 2019-11-13

## 2019-11-13 DIAGNOSIS — R20.0 NUMBNESS OF TOES: Primary | ICD-10-CM

## 2019-11-13 DIAGNOSIS — R73.09 ELEVATED GLUCOSE: ICD-10-CM

## 2019-11-13 NOTE — TELEPHONE ENCOUNTER
Called the [facility] lab at [phone]. Spoke with Tin. Informed her A1C lab order has been added. She verbalized her understanding and will process the test.     Patient notified.

## 2019-11-13 NOTE — TELEPHONE ENCOUNTER
Patient calling and states that she just did her blood tests today, but she told the phlebotomist to draw another tube for her AIC due to her numbness. Requesting order for [facility] so that her blood will not be wasted.     Dr Carlos=see above, lab pended for

## 2019-11-16 ENCOUNTER — TELEPHONE (OUTPATIENT)
Dept: INTERNAL MEDICINE CLINIC | Facility: CLINIC | Age: 69
End: 2019-11-16

## 2019-11-18 ENCOUNTER — MED REC SCAN ONLY (OUTPATIENT)
Dept: INTERNAL MEDICINE CLINIC | Facility: CLINIC | Age: 69
End: 2019-11-18

## 2019-11-18 ENCOUNTER — OFFICE VISIT (OUTPATIENT)
Dept: INTERNAL MEDICINE CLINIC | Facility: CLINIC | Age: 69
End: 2019-11-18
Payer: COMMERCIAL

## 2019-11-18 VITALS
HEART RATE: 83 BPM | RESPIRATION RATE: 18 BRPM | DIASTOLIC BLOOD PRESSURE: 55 MMHG | WEIGHT: 150.5 LBS | BODY MASS INDEX: 22.81 KG/M2 | SYSTOLIC BLOOD PRESSURE: 113 MMHG | HEIGHT: 68 IN

## 2019-11-18 DIAGNOSIS — K51.00 ULCERATIVE PANCOLITIS WITHOUT COMPLICATION (HCC): ICD-10-CM

## 2019-11-18 DIAGNOSIS — G62.9 NEUROPATHY: ICD-10-CM

## 2019-11-18 DIAGNOSIS — Z00.00 ROUTINE HEALTH MAINTENANCE: Primary | ICD-10-CM

## 2019-11-18 DIAGNOSIS — Z23 NEED FOR VACCINATION: ICD-10-CM

## 2019-11-18 PROCEDURE — 90662 IIV NO PRSV INCREASED AG IM: CPT | Performed by: INTERNAL MEDICINE

## 2019-11-18 PROCEDURE — 90471 IMMUNIZATION ADMIN: CPT | Performed by: INTERNAL MEDICINE

## 2019-11-18 PROCEDURE — 99397 PER PM REEVAL EST PAT 65+ YR: CPT | Performed by: INTERNAL MEDICINE

## 2019-11-18 PROCEDURE — 99213 OFFICE O/P EST LOW 20 MIN: CPT | Performed by: INTERNAL MEDICINE

## 2019-11-18 RX ORDER — GABAPENTIN 100 MG/1
200 CAPSULE ORAL 2 TIMES DAILY
Qty: 120 CAPSULE | Refills: 3 | Status: SHIPPED | OUTPATIENT
Start: 2019-11-18 | End: 2020-06-18

## 2019-11-18 NOTE — PROGRESS NOTES
HPI:    Patient ID: Tamar Shaffer is a 77year old female. Pt is here for a physical.    Pt is having left bunion surgery Wednesday. She has numbness in all of her toes for the past 2 years. No history of back pain.     Numbness   This is a new prob 11/06/2017    TVH/BSO/vag vault suspension / post repair   • PUBO VAGINAL SLING N/A 11/6/2017    Performed by Brittney Leon MD at [facility] OR   • TOTAL LAPAROSCOPIC HYSTERECTOMY N/A 11/6/2017    Performed by Mary Tyler MD at [address] [place] [address] arm, Patient Position: Sitting, Cuff Size: adult)   Pulse 83   Resp 18   Ht 5' 8\" (1.727 m)   Wt 150 lb 8 oz (68.3 kg)   Breastfeeding No   BMI 22.88 kg/m²   PHYSICAL EXAM:   Physical Exam    Constitutional: She is oriented to person, place, and time.  She pt.              Unprioritized    Ulcerative colitis ([facility] [address]     Controlled. Continue present management. Neuropathy     Patient has chronic neuropathy in her all of her toes. It bothers her during the day as well as at night.   She has had a fairly

## 2019-11-19 NOTE — ASSESSMENT & PLAN NOTE
Patient has chronic neuropathy in her all of her toes. It bothers her during the day as well as at night. She has had a fairly comprehensive metabolic work-up. She has been on metronidazole in the past which can cause neuropathy.   Alternatively, ulcerat

## 2019-11-27 ENCOUNTER — OFFICE VISIT (OUTPATIENT)
Dept: PODIATRY CLINIC | Facility: CLINIC | Age: 69
End: 2019-11-27
Payer: COMMERCIAL

## 2019-11-27 DIAGNOSIS — Z98.890 POST-OPERATIVE STATE: Primary | ICD-10-CM

## 2019-11-27 PROCEDURE — 99024 POSTOP FOLLOW-UP VISIT: CPT | Performed by: PODIATRIST

## 2019-12-01 NOTE — PROGRESS NOTES
Joselito Castro is a 77year old female.  Patient presents with:  Post-Op: Left bunionectomy - 1st visit- had sx by [facility] on 11/20/19 - states she has fanthom shooting pain once in a while - no other c/o        HPI:   This pleasant patient presents to the cl 1966   • HYSTERECTOMY  11/06/2017    TVH/BSO/vag vault suspension / post repair   • PUBO VAGINAL SLING N/A 11/6/2017    Performed by José Swanson MD at [facility] OR   • TOTAL LAPAROSCOPIC HYSTERECTOMY N/A 11/6/2017    Performed by Yayo Jacobsen MD headaches    EXAM:   There were no vitals taken for this visit. Physical Exam  GENERAL: well developed, well nourished, in no apparent distress  EXTREMITIES:   Incision line looks well coapted no sign of infection noted.   Aseptic dressing was reapplied ma

## 2019-12-03 ENCOUNTER — HOSPITAL ENCOUNTER (OUTPATIENT)
Dept: GENERAL RADIOLOGY | Facility: HOSPITAL | Age: 69
Discharge: HOME | End: 2019-12-03
Attending: PODIATRIST
Payer: COMMERCIAL

## 2019-12-03 ENCOUNTER — TELEPHONE (OUTPATIENT)
Dept: PODIATRY CLINIC | Facility: CLINIC | Age: 69
End: 2019-12-03

## 2019-12-03 ENCOUNTER — OFFICE VISIT (OUTPATIENT)
Dept: PODIATRY CLINIC | Facility: CLINIC | Age: 69
End: 2019-12-03
Payer: COMMERCIAL

## 2019-12-03 DIAGNOSIS — M21.612 BUNION, LEFT FOOT: ICD-10-CM

## 2019-12-03 DIAGNOSIS — M21.612 BUNION, LEFT FOOT: Primary | ICD-10-CM

## 2019-12-03 PROCEDURE — 99024 POSTOP FOLLOW-UP VISIT: CPT | Performed by: PODIATRIST

## 2019-12-03 PROCEDURE — 73630 X-RAY EXAM OF FOOT: CPT | Performed by: PODIATRIST

## 2019-12-03 NOTE — PROGRESS NOTES
HPI:    Patient ID: Jorje Montgomery is a 77year old female. 23-year-old female presents 2 weeks post left bunionectomy with no specific noted complaints. ROS:     I did review medical status medications were noted.          Current Outpatient Medica

## 2019-12-03 NOTE — TELEPHONE ENCOUNTER
Pt asking while in office today how long she is to wear surgical shoe. Pt left before I could answer her question. Per Dr. Nora Pedroza, pt is to wear Sx shoe until next office visit. Sending message to pt with Rishi's instructions via Myfacepage.

## 2019-12-10 ENCOUNTER — TELEPHONE (OUTPATIENT)
Dept: GASTROENTEROLOGY | Facility: CLINIC | Age: 69
End: 2019-12-10

## 2019-12-10 NOTE — TELEPHONE ENCOUNTER
----- Message from Umesh Boo MD sent at 12/10/2019  9:36 AM CST -----  Repeat colonoscopy in 2 years  Follow up in clinic in 1 year

## 2019-12-10 NOTE — TELEPHONE ENCOUNTER
Entered into Epic:Recall colon in 2 years per Dr. Eloisa Dhillon. Last Colon done 11/7/19, next due 11/7/21. HM updated. Letter mailed.

## 2019-12-15 ENCOUNTER — PATIENT MESSAGE (OUTPATIENT)
Dept: GASTROENTEROLOGY | Facility: CLINIC | Age: 69
End: 2019-12-15

## 2019-12-16 RX ORDER — MESALAMINE 400 MG/1
CAPSULE, DELAYED RELEASE ORAL
Qty: 180 CAPSULE | Refills: 0 | Status: SHIPPED | OUTPATIENT
Start: 2019-12-16 | End: 2020-01-20

## 2019-12-16 NOTE — TELEPHONE ENCOUNTER
Requested Prescriptions     Pending Prescriptions Disp Refills   • MESALAMINE 400 MG Oral Capsule Delayed Release [Pharmacy Med Name: MESALAMINE 400MG DR CAPSULES] 180 capsule 0     Sig: TAKE 2 CAPSULES(800 MG) BY MOUTH THREE TIMES DAILY BEFORE MEALS   LR:

## 2019-12-16 NOTE — TELEPHONE ENCOUNTER
From: Bebe Lowe  To: Jose Onofre MD  Sent: 12/15/2019 10:26 AM CST  Subject: Littie Flax Dr Kamryn Monzon,  My pharmacy has just sent you a request for Rx refill on my mesalamine.  Wondering tho, since me recent colonoscopy indicates no a

## 2019-12-17 NOTE — TELEPHONE ENCOUNTER
Called patient and discussed the medications  Discussed the PG and UC is a lifelong disease. Given she has had control for the last 2 years would not change medications. She is worried that her deductible has gone up and cost will be an issue.  She is going

## 2019-12-20 ENCOUNTER — TELEPHONE (OUTPATIENT)
Dept: GASTROENTEROLOGY | Facility: CLINIC | Age: 69
End: 2019-12-20

## 2019-12-20 NOTE — TELEPHONE ENCOUNTER
Dangelo from [facility] calling to ask if rx:Remicade given on  8/28/19 dosage was received from stock or speciality pharmacy, please call at:[phone],thanks.   Refer to Reference:[id number], ok to leave detailed message

## 2019-12-26 ENCOUNTER — OFFICE VISIT (OUTPATIENT)
Dept: HEMATOLOGY/ONCOLOGY | Facility: HOSPITAL | Age: 69
End: 2019-12-26
Attending: INTERNAL MEDICINE
Payer: COMMERCIAL

## 2019-12-26 VITALS
WEIGHT: 154 LBS | SYSTOLIC BLOOD PRESSURE: 93 MMHG | BODY MASS INDEX: 23 KG/M2 | OXYGEN SATURATION: 96 % | HEART RATE: 64 BPM | RESPIRATION RATE: 16 BRPM | DIASTOLIC BLOOD PRESSURE: 51 MMHG | TEMPERATURE: 99 F

## 2019-12-26 DIAGNOSIS — K51.019 CHRONIC ULCERATIVE PANCOLITIS WITH COMPLICATION (HCC): Primary | ICD-10-CM

## 2019-12-26 DIAGNOSIS — K51.011 ULCERATIVE PANCOLITIS WITH RECTAL BLEEDING (HCC): ICD-10-CM

## 2019-12-26 PROCEDURE — 96415 CHEMO IV INFUSION ADDL HR: CPT

## 2019-12-26 PROCEDURE — 96413 CHEMO IV INFUSION 1 HR: CPT

## 2019-12-26 RX ORDER — ACETAMINOPHEN 325 MG/1
650 TABLET ORAL ONCE
Status: COMPLETED | OUTPATIENT
Start: 2019-12-26 | End: 2019-12-26

## 2019-12-26 RX ORDER — DIPHENHYDRAMINE HYDROCHLORIDE 50 MG/ML
25 INJECTION INTRAMUSCULAR; INTRAVENOUS ONCE
Status: CANCELLED | OUTPATIENT
Start: 2020-02-20

## 2019-12-26 RX ORDER — ACETAMINOPHEN 325 MG/1
650 TABLET ORAL ONCE
Status: CANCELLED | OUTPATIENT
Start: 2020-02-20

## 2019-12-26 RX ORDER — DIPHENHYDRAMINE HCL 25 MG
25 CAPSULE ORAL ONCE
Status: CANCELLED | OUTPATIENT
Start: 2020-02-20

## 2019-12-26 RX ORDER — ACETAMINOPHEN 325 MG/1
650 TABLET ORAL AS NEEDED
Status: CANCELLED | OUTPATIENT
Start: 2020-02-20

## 2019-12-26 RX ADMIN — ACETAMINOPHEN 650 MG: 325 TABLET ORAL at 14:18:00

## 2019-12-26 NOTE — PROGRESS NOTES
Madelyn to infusion for remicade ordered every 8 weeks for ulcerative colitis. Does not take benadryl; oral tylenol given and iv push slow 200mg solucortef. States has been in remission with no s/s of. Refer to charting of PIV.  Remicade infused in 2 ho

## 2019-12-26 NOTE — TELEPHONE ENCOUNTER
Dangelo returned my call.     I gave her the phone number to the [facility] to call them to answer her questions

## 2019-12-27 ENCOUNTER — OFFICE VISIT (OUTPATIENT)
Dept: PODIATRY CLINIC | Facility: CLINIC | Age: 69
End: 2019-12-27
Payer: COMMERCIAL

## 2019-12-27 DIAGNOSIS — M21.612 BUNION, LEFT FOOT: Primary | ICD-10-CM

## 2019-12-27 PROCEDURE — 99024 POSTOP FOLLOW-UP VISIT: CPT | Performed by: PODIATRIST

## 2019-12-27 NOTE — PROGRESS NOTES
HPI:    Patient ID: Alice Schroeder is a 77year old female. 61-year-old female presents postsurgical at 5 weeks. She has no specific complaints or noted concerns. She believes she is doing very well.       ROS:              Current Outpatient Medicatio

## 2020-01-20 ENCOUNTER — TELEPHONE (OUTPATIENT)
Dept: HEMATOLOGY/ONCOLOGY | Facility: HOSPITAL | Age: 70
End: 2020-01-20

## 2020-01-20 RX ORDER — MESALAMINE 400 MG/1
CAPSULE, DELAYED RELEASE ORAL
Qty: 180 CAPSULE | Refills: 0 | Status: SHIPPED | OUTPATIENT
Start: 2020-01-20 | End: 2020-04-14

## 2020-01-20 NOTE — TELEPHONE ENCOUNTER
Requested Prescriptions     Pending Prescriptions Disp Refills   • MESALAMINE 400 MG Oral Capsule Delayed Release [Pharmacy Med Name: MESALAMINE 400MG DR CAPSULES] 180 capsule 0     Sig: TAKE 2 CAPSULES(800 MG) BY MOUTH THREE TIMES DAILY BEFORE MEALS     l

## 2020-01-20 NOTE — TELEPHONE ENCOUNTER
Patient called after houramanda requesting to change her appt for 2/19 to later time.  She has to work. Dory Whitman

## 2020-02-19 ENCOUNTER — OFFICE VISIT (OUTPATIENT)
Dept: HEMATOLOGY/ONCOLOGY | Facility: HOSPITAL | Age: 70
End: 2020-02-19
Attending: INTERNAL MEDICINE
Payer: COMMERCIAL

## 2020-02-19 VITALS
SYSTOLIC BLOOD PRESSURE: 130 MMHG | DIASTOLIC BLOOD PRESSURE: 57 MMHG | WEIGHT: 154 LBS | BODY MASS INDEX: 23.34 KG/M2 | TEMPERATURE: 98 F | RESPIRATION RATE: 16 BRPM | HEART RATE: 86 BPM | HEIGHT: 68 IN

## 2020-02-19 DIAGNOSIS — K51.011 ULCERATIVE PANCOLITIS WITH RECTAL BLEEDING (HCC): ICD-10-CM

## 2020-02-19 DIAGNOSIS — K51.019 CHRONIC ULCERATIVE PANCOLITIS WITH COMPLICATION (HCC): Primary | ICD-10-CM

## 2020-02-19 PROCEDURE — 96375 TX/PRO/DX INJ NEW DRUG ADDON: CPT

## 2020-02-19 PROCEDURE — 96415 CHEMO IV INFUSION ADDL HR: CPT

## 2020-02-19 PROCEDURE — 96413 CHEMO IV INFUSION 1 HR: CPT

## 2020-02-19 RX ORDER — ACETAMINOPHEN 325 MG/1
650 TABLET ORAL ONCE
Status: COMPLETED | OUTPATIENT
Start: 2020-02-19 | End: 2020-02-19

## 2020-02-19 RX ORDER — ACETAMINOPHEN 325 MG/1
TABLET ORAL
Status: COMPLETED
Start: 2020-02-19 | End: 2020-02-19

## 2020-02-19 RX ORDER — DIPHENHYDRAMINE HCL 25 MG
25 CAPSULE ORAL ONCE
Status: CANCELLED | OUTPATIENT
Start: 2020-04-15

## 2020-02-19 RX ORDER — DIPHENHYDRAMINE HYDROCHLORIDE 50 MG/ML
25 INJECTION INTRAMUSCULAR; INTRAVENOUS ONCE
Status: CANCELLED | OUTPATIENT
Start: 2020-04-15

## 2020-02-19 RX ORDER — ACETAMINOPHEN 325 MG/1
650 TABLET ORAL AS NEEDED
Status: CANCELLED | OUTPATIENT
Start: 2020-04-15

## 2020-02-19 RX ORDER — DIPHENHYDRAMINE HYDROCHLORIDE 50 MG/ML
25 INJECTION INTRAMUSCULAR; INTRAVENOUS ONCE
Status: DISCONTINUED | OUTPATIENT
Start: 2020-02-19 | End: 2020-02-19

## 2020-02-19 RX ORDER — ACETAMINOPHEN 325 MG/1
650 TABLET ORAL ONCE
Status: CANCELLED | OUTPATIENT
Start: 2020-04-15

## 2020-02-19 RX ORDER — DIPHENHYDRAMINE HCL 25 MG
25 CAPSULE ORAL ONCE
Status: DISCONTINUED | OUTPATIENT
Start: 2020-02-19 | End: 2020-02-19

## 2020-02-19 RX ADMIN — ACETAMINOPHEN 650 MG: 325 TABLET ORAL at 16:47:00

## 2020-02-19 NOTE — PROGRESS NOTES
Pt to infusion for q8 week Remicade to treat ulcerative colitis. Arrived ambulating independently. Feeling well, offers no complaints. Denies fevers, chills or s/s of infection. Pre-medicated with Tylenol and solu-cortef IVP as ordered.  Pt refuses Benadryl

## 2020-02-20 ENCOUNTER — APPOINTMENT (OUTPATIENT)
Dept: HEMATOLOGY/ONCOLOGY | Facility: HOSPITAL | Age: 70
End: 2020-02-20
Attending: INTERNAL MEDICINE
Payer: COMMERCIAL

## 2020-04-14 ENCOUNTER — TELEPHONE (OUTPATIENT)
Dept: GASTROENTEROLOGY | Facility: CLINIC | Age: 70
End: 2020-04-14

## 2020-04-14 RX ORDER — MESALAMINE 400 MG/1
CAPSULE, DELAYED RELEASE ORAL
Qty: 180 CAPSULE | Refills: 0 | Status: SHIPPED | OUTPATIENT
Start: 2020-04-14 | End: 2020-04-15

## 2020-04-14 NOTE — TELEPHONE ENCOUNTER
Forwarded both [employer] messages below to Dr Aarti Charlton. Only 30 days sent in. Requesting 90 days please . Thanks. From  Roge Chung To  [facility] Clinical Staff Sent  4/14/2020 11:35 AM   Thank U Dr Aarti Charlton.    Lorie daurte has received the refill

## 2020-04-15 ENCOUNTER — OFFICE VISIT (OUTPATIENT)
Dept: HEMATOLOGY/ONCOLOGY | Facility: HOSPITAL | Age: 70
End: 2020-04-15
Attending: INTERNAL MEDICINE
Payer: COMMERCIAL

## 2020-04-15 VITALS
OXYGEN SATURATION: 96 % | BODY MASS INDEX: 24 KG/M2 | DIASTOLIC BLOOD PRESSURE: 64 MMHG | TEMPERATURE: 99 F | WEIGHT: 157.63 LBS | RESPIRATION RATE: 16 BRPM | SYSTOLIC BLOOD PRESSURE: 133 MMHG | HEART RATE: 76 BPM

## 2020-04-15 DIAGNOSIS — K51.011 ULCERATIVE PANCOLITIS WITH RECTAL BLEEDING (HCC): ICD-10-CM

## 2020-04-15 DIAGNOSIS — K51.019 CHRONIC ULCERATIVE PANCOLITIS WITH COMPLICATION (HCC): Primary | ICD-10-CM

## 2020-04-15 PROCEDURE — 96375 TX/PRO/DX INJ NEW DRUG ADDON: CPT

## 2020-04-15 PROCEDURE — 96415 CHEMO IV INFUSION ADDL HR: CPT

## 2020-04-15 PROCEDURE — 96413 CHEMO IV INFUSION 1 HR: CPT

## 2020-04-15 RX ORDER — MESALAMINE 400 MG/1
800 CAPSULE, DELAYED RELEASE ORAL
Qty: 540 CAPSULE | Refills: 0 | Status: SHIPPED | OUTPATIENT
Start: 2020-04-15 | End: 2020-07-14

## 2020-04-15 RX ORDER — ACETAMINOPHEN 325 MG/1
650 TABLET ORAL ONCE
Status: CANCELLED | OUTPATIENT
Start: 2020-04-15

## 2020-04-15 RX ORDER — ACETAMINOPHEN 325 MG/1
650 TABLET ORAL AS NEEDED
Status: CANCELLED | OUTPATIENT
Start: 2020-04-15

## 2020-04-15 RX ORDER — DIPHENHYDRAMINE HCL 25 MG
25 CAPSULE ORAL ONCE
Status: CANCELLED | OUTPATIENT
Start: 2020-04-15

## 2020-04-15 RX ORDER — ACETAMINOPHEN 325 MG/1
TABLET ORAL
Status: COMPLETED
Start: 2020-04-15 | End: 2020-04-15

## 2020-04-15 RX ORDER — DIPHENHYDRAMINE HYDROCHLORIDE 50 MG/ML
25 INJECTION INTRAMUSCULAR; INTRAVENOUS ONCE
Status: CANCELLED | OUTPATIENT
Start: 2020-04-15

## 2020-04-15 RX ADMIN — ACETAMINOPHEN 650 MG: 325 TABLET ORAL at 14:57:00

## 2020-04-15 NOTE — TELEPHONE ENCOUNTER
Given pandemic would not want patient to have a flare. Would continue current regimen with taking precautions (6-10 ft distance, avoid necessarily leaving home, etc.)    Continue infusion as planned.      Radha Saba MD

## 2020-04-15 NOTE — TELEPHONE ENCOUNTER
Per verbal order of Dr Missy Lovell, proceed as planned with remicade infusion today. Left complete message on patient's identified cell voicemail to proceed as planned with infusion.

## 2020-04-15 NOTE — TELEPHONE ENCOUNTER
Dr Marcela Burgos, patient informed rx sent below. She is scheduled at 2:30 today for her Remicade infusion and received confirmation from infusion center, but just wanted to be sure you wanted her to go in? Thanks.

## 2020-04-21 ENCOUNTER — TELEPHONE (OUTPATIENT)
Dept: GASTROENTEROLOGY | Facility: CLINIC | Age: 70
End: 2020-04-21

## 2020-04-21 NOTE — TELEPHONE ENCOUNTER
Patient contacted, informed we do accept Medicare and BCBS supp plan G. Informed I will have to speak to insurance verification at [address] [place] [address] to find out details about re-authorization starting May 1.  She does not yet have her cards but will have

## 2020-05-04 ENCOUNTER — TELEPHONE (OUTPATIENT)
Dept: GASTROENTEROLOGY | Facility: CLINIC | Age: 70
End: 2020-05-04

## 2020-05-04 NOTE — TELEPHONE ENCOUNTER
I will have to contact [facility] in insurance verification [facility] infusion center tomorrow to check on this. See detailed 4/21 encounter. Patient was to contact us with new insurance starting May 1, as no re-authorization can be done until then.  I see last infus

## 2020-05-05 NOTE — TELEPHONE ENCOUNTER
I printed out the attached insurance cards for medicare and BCBS supp . Will give to TUAN DUKES \[facility] [place] [facility]\""  [facility] tomorrow so can be scanned. Copies also faxed to Frances Mascorro in infusion center below so she can begin [place] process.  Confirmation received 4:56 pm, and barbi

## 2020-05-05 NOTE — TELEPHONE ENCOUNTER
I spoke Sunshine Haywood in infusion center re below billing question. She is showing patient has zero balance, but states I may give patient her number to call and discuss.  Patient still does not have new insurance cards, I recommended that she call Medicare and BCB

## 2020-06-10 ENCOUNTER — PATIENT MESSAGE (OUTPATIENT)
Dept: GASTROENTEROLOGY | Facility: CLINIC | Age: 70
End: 2020-06-10

## 2020-06-10 NOTE — TELEPHONE ENCOUNTER
From: Elisbaet Harrell  To: Shanon Ashton MD  Sent: 6/10/2020 12:15 AM CDT  Subject: Visit [facility] Dr Maria L Mo,  I recently called the [facility] to see if they were going to be scheduling me for an appt for my Remicade treatment.  Grey

## 2020-06-10 NOTE — TELEPHONE ENCOUNTER
Dr Marcela Burgos,    I placed a Remicade order on your desk for signature.  Please sign and I can fax it back to the [facility]

## 2020-06-15 NOTE — TELEPHONE ENCOUNTER
I faxed the Remicade order to the [facility].     Dr Dianna Marion,    Please address pt's MyChart question

## 2020-06-18 ENCOUNTER — OFFICE VISIT (OUTPATIENT)
Dept: HEMATOLOGY/ONCOLOGY | Facility: HOSPITAL | Age: 70
End: 2020-06-18
Attending: INTERNAL MEDICINE
Payer: MEDICARE

## 2020-06-18 VITALS
HEART RATE: 80 BPM | DIASTOLIC BLOOD PRESSURE: 60 MMHG | TEMPERATURE: 98 F | OXYGEN SATURATION: 95 % | WEIGHT: 150.81 LBS | BODY MASS INDEX: 23 KG/M2 | RESPIRATION RATE: 16 BRPM | SYSTOLIC BLOOD PRESSURE: 130 MMHG

## 2020-06-18 DIAGNOSIS — K51.011 ULCERATIVE PANCOLITIS WITH RECTAL BLEEDING (HCC): Primary | ICD-10-CM

## 2020-06-18 PROCEDURE — 96375 TX/PRO/DX INJ NEW DRUG ADDON: CPT

## 2020-06-18 PROCEDURE — 96415 CHEMO IV INFUSION ADDL HR: CPT

## 2020-06-18 PROCEDURE — 96413 CHEMO IV INFUSION 1 HR: CPT

## 2020-06-18 NOTE — PROGRESS NOTES
Pt to infusion for q8 week Remicade to treat ulcerative colitis. Arrived ambulating independently. Feeling well, offers no complaints. States she has been in remission- no bleeding, cramps, diarrhea. Denies fevers, chills or s/s of infection.  Pre-medica

## 2020-06-22 ENCOUNTER — VIRTUAL PHONE E/M (OUTPATIENT)
Dept: GASTROENTEROLOGY | Facility: CLINIC | Age: 70
End: 2020-06-22
Payer: MEDICARE

## 2020-06-22 DIAGNOSIS — Z79.52 IMMUNOSUPPRESSION DUE TO CHRONIC STEROID USE (HCC): ICD-10-CM

## 2020-06-22 DIAGNOSIS — L88 PYODERMA GANGRENOSUM: ICD-10-CM

## 2020-06-22 DIAGNOSIS — K51.00 ULCERATIVE PANCOLITIS WITHOUT COMPLICATION (HCC): Primary | ICD-10-CM

## 2020-06-22 DIAGNOSIS — D84.821 IMMUNOSUPPRESSION DUE TO CHRONIC STEROID USE (HCC): ICD-10-CM

## 2020-06-22 DIAGNOSIS — T38.0X5A IMMUNOSUPPRESSION DUE TO CHRONIC STEROID USE (HCC): ICD-10-CM

## 2020-06-22 PROCEDURE — 99443 PHONE E/M BY PHYS 21-30 MIN: CPT | Performed by: INTERNAL MEDICINE

## 2020-06-22 NOTE — PROGRESS NOTES
Virtual Telephone Check-In    Alice Schroeder verbally consents to a Virtual/Telephone Check-In visit on 06/22/20. Patient has been referred to the [facility] website at [email or web address] to review the yearly Consent to Treat document.     Patient unders time it was recommended to check a Remicade level prior to the next dose.   A timeout was discussed given risk of monotherapy to give IV hydrocortisone 200 mg prior to each infusion to decrease the risk of immunogenicity.      She now presents if she is due colonoscope was inserted and advanced without difficulty to the cecum using the CO2 insufflation technique. The cecum was identified by localizing the trifold, the appendix and the ileocecal valve.  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 Last colonoscopy 11/2019 without active colitis. Patient wants to deescalate her treatment for UC pancolitis.  Discussed will keep treatment stable for now and readdress a

## 2020-06-22 NOTE — PATIENT INSTRUCTIONS
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 Last colonoscopy 11/2019 without active colitis.  Patient wants t

## 2020-07-01 ENCOUNTER — PATIENT MESSAGE (OUTPATIENT)
Dept: INTERNAL MEDICINE CLINIC | Facility: CLINIC | Age: 70
End: 2020-07-01

## 2020-07-02 NOTE — TELEPHONE ENCOUNTER
From: Jose Rafael Mitchell  To: Lucila Lr MD  Sent: 7/1/2020 7:37 PM CDT  Subject: Referral David Henry,  I recently visited with my [facility], Dr. Maico Murray to discuss my concerns about long-term use & effects of the infliximab infusion treatmen

## 2020-07-02 NOTE — TELEPHONE ENCOUNTER
Routed to Dr Mirlande Mcdonough for advise, thanks. Also to reception staff, pls call and assist pt for appt.    TY      Future Appointments   Date Time Provider Nadine Serrano   8/13/2020  9:30 AM [facility] INFRN 1 [facility] CHEMO EMO

## 2020-07-05 ENCOUNTER — TELEPHONE (OUTPATIENT)
Dept: INTERNAL MEDICINE CLINIC | Facility: CLINIC | Age: 70
End: 2020-07-05

## 2020-07-06 NOTE — TELEPHONE ENCOUNTER
Routed to Dr Inna Hawkins for advise, just to confirm you want to see pt in the office this Wednesday ( 7/8) at 10:40 am for f/u, right? To reception staff, pls confirm with pt re appt and schedule, per EMR MD start at 11 am on that day. Thanks.   Pt last CP

## 2020-07-08 ENCOUNTER — TELEMEDICINE (OUTPATIENT)
Dept: INTERNAL MEDICINE CLINIC | Facility: CLINIC | Age: 70
End: 2020-07-08
Payer: MEDICARE

## 2020-07-08 DIAGNOSIS — Z12.31 ENCOUNTER FOR SCREENING MAMMOGRAM FOR MALIGNANT NEOPLASM OF BREAST: ICD-10-CM

## 2020-07-08 DIAGNOSIS — Z23 NEED FOR VACCINATION: ICD-10-CM

## 2020-07-08 DIAGNOSIS — M17.10 ARTHRITIS OF KNEE: Primary | ICD-10-CM

## 2020-07-08 DIAGNOSIS — K51.00 ULCERATIVE PANCOLITIS WITHOUT COMPLICATION (HCC): ICD-10-CM

## 2020-07-08 DIAGNOSIS — G62.9 NEUROPATHY: ICD-10-CM

## 2020-07-08 PROBLEM — L60.9 DISEASE OF NAIL: Status: RESOLVED | Noted: 2019-11-05 | Resolved: 2020-07-08

## 2020-07-08 PROBLEM — Z92.241 HISTORY OF RECENT STEROID USE: Status: RESOLVED | Noted: 2017-10-23 | Resolved: 2020-07-08

## 2020-07-08 PROBLEM — R79.89 ABNORMAL THYROID BLOOD TEST: Status: RESOLVED | Noted: 2017-09-21 | Resolved: 2020-07-08

## 2020-07-08 PROBLEM — Z00.00 ROUTINE HEALTH MAINTENANCE: Status: RESOLVED | Noted: 2017-09-21 | Resolved: 2020-07-08

## 2020-07-08 PROCEDURE — 99214 OFFICE O/P EST MOD 30 MIN: CPT | Performed by: INTERNAL MEDICINE

## 2020-07-08 NOTE — PROGRESS NOTES
Video Progress Note  Telehealth Verbal Consent   I conducted a telehealth visit with Dnote Nunez today, 07/08/20, which was completed using two-way, real-time interactive audio and video communication.  This has been done in good briseida to provide kimberlyn is a chronic problem. The current episode started more than 1 year ago. The problem occurs constantly. The problem has been waxing and waning. The quality of the pain is described as aching. The pain is at a severity of 7/10.  Associated symptoms include khalif by mouth daily. 90 tablet 4   • InFLIXimab (REMICADE IV) Inject into the vein. • B Complex Vitamins (VITAMIN-B COMPLEX OR) Take by mouth daily. Allergies:  Sulfa Antibiotics       PAIN     Social History    Tobacco Use      Smoking status:  Form She has tried physical therapy and medication. I will refer her to the orthopedist.         Relevant Orders    ORTHOPEDIC - INTERNAL       Unprioritized    Ulcerative colitis ([facility] [address]     This is controlled with Remicade.   She will continue this and follow-

## 2020-07-09 NOTE — ASSESSMENT & PLAN NOTE
This is controlled with Remicade.   She will continue this and follow-up with her gastroenterologist.

## 2020-07-09 NOTE — ASSESSMENT & PLAN NOTE
Patient has worsening pain in her knee. She has tried physical therapy and medication.   I will refer her to the orthopedist.

## 2020-07-09 NOTE — ASSESSMENT & PLAN NOTE
Patient says the neuropathic pain is tolerable, however she is concerned that it is worsening.   She would like to see the neurologist.

## 2020-07-14 ENCOUNTER — NURSE ONLY (OUTPATIENT)
Dept: INTERNAL MEDICINE CLINIC | Facility: CLINIC | Age: 70
End: 2020-07-14
Payer: MEDICARE

## 2020-07-14 DIAGNOSIS — Z23 NEED FOR VACCINATION: Primary | ICD-10-CM

## 2020-07-14 PROCEDURE — 90732 PPSV23 VACC 2 YRS+ SUBQ/IM: CPT | Performed by: INTERNAL MEDICINE

## 2020-07-14 PROCEDURE — G0009 ADMIN PNEUMOCOCCAL VACCINE: HCPCS | Performed by: INTERNAL MEDICINE

## 2020-07-14 NOTE — PROGRESS NOTES
Patient was in today for a scheduled nurse visit. Patient name, , and allergies verified with patient. Patient was advised by PCP Dr. Rachel Ruiz, to make a nurse visit to receive pneumovax injection during a virtual visit.  Patient received injection

## 2020-07-23 ENCOUNTER — TELEPHONE (OUTPATIENT)
Dept: GASTROENTEROLOGY | Facility: CLINIC | Age: 70
End: 2020-07-23

## 2020-07-29 ENCOUNTER — HOSPITAL ENCOUNTER (OUTPATIENT)
Dept: BONE DENSITY | Age: 70
Discharge: HOME | End: 2020-07-29
Attending: INTERNAL MEDICINE
Payer: MEDICARE

## 2020-07-29 DIAGNOSIS — T38.0X5A IMMUNOSUPPRESSION DUE TO CHRONIC STEROID USE (HCC): ICD-10-CM

## 2020-07-29 DIAGNOSIS — D84.821 IMMUNOSUPPRESSION DUE TO CHRONIC STEROID USE (HCC): ICD-10-CM

## 2020-07-29 DIAGNOSIS — Z79.52 IMMUNOSUPPRESSION DUE TO CHRONIC STEROID USE (HCC): ICD-10-CM

## 2020-07-29 DIAGNOSIS — L88 PYODERMA GANGRENOSUM: ICD-10-CM

## 2020-07-29 DIAGNOSIS — K51.00 ULCERATIVE PANCOLITIS WITHOUT COMPLICATION (HCC): ICD-10-CM

## 2020-07-29 PROCEDURE — 77080 DXA BONE DENSITY AXIAL: CPT | Performed by: INTERNAL MEDICINE

## 2020-08-13 ENCOUNTER — OFFICE VISIT (OUTPATIENT)
Dept: HEMATOLOGY/ONCOLOGY | Facility: HOSPITAL | Age: 70
End: 2020-08-13
Attending: INTERNAL MEDICINE
Payer: MEDICARE

## 2020-08-13 VITALS
BODY MASS INDEX: 23 KG/M2 | DIASTOLIC BLOOD PRESSURE: 57 MMHG | HEART RATE: 53 BPM | OXYGEN SATURATION: 97 % | RESPIRATION RATE: 16 BRPM | TEMPERATURE: 98 F | SYSTOLIC BLOOD PRESSURE: 107 MMHG | WEIGHT: 151.63 LBS

## 2020-08-13 DIAGNOSIS — K51.011 ULCERATIVE PANCOLITIS WITH RECTAL BLEEDING (HCC): Primary | ICD-10-CM

## 2020-08-13 PROCEDURE — 96415 CHEMO IV INFUSION ADDL HR: CPT

## 2020-08-13 PROCEDURE — 96375 TX/PRO/DX INJ NEW DRUG ADDON: CPT

## 2020-08-13 PROCEDURE — 96413 CHEMO IV INFUSION 1 HR: CPT

## 2020-08-13 NOTE — PROGRESS NOTES
Pt to infusion for q8 week Remicade to treat ulcerative colitis. Arrived ambulating independently. Feeling well, offers no complaints. Denies any GI symptoms- no bleeding, cramps, diarrhea. Denies fevers, chills or s/s of infection.  PIV established to r

## 2020-08-14 RX ORDER — MESALAMINE 400 MG/1
CAPSULE, DELAYED RELEASE ORAL
Qty: 180 CAPSULE | Refills: 0 | Status: SHIPPED | OUTPATIENT
Start: 2020-08-14 | End: 2021-01-11

## 2020-10-07 ENCOUNTER — OFFICE VISIT (OUTPATIENT)
Dept: HEMATOLOGY/ONCOLOGY | Facility: HOSPITAL | Age: 70
End: 2020-10-07
Attending: INTERNAL MEDICINE
Payer: MEDICARE

## 2020-10-07 VITALS
RESPIRATION RATE: 16 BRPM | SYSTOLIC BLOOD PRESSURE: 111 MMHG | BODY MASS INDEX: 22 KG/M2 | WEIGHT: 147.19 LBS | DIASTOLIC BLOOD PRESSURE: 58 MMHG | HEART RATE: 62 BPM | TEMPERATURE: 99 F | OXYGEN SATURATION: 96 %

## 2020-10-07 DIAGNOSIS — K51.011 ULCERATIVE PANCOLITIS WITH RECTAL BLEEDING (HCC): Primary | ICD-10-CM

## 2020-10-07 PROCEDURE — 96415 CHEMO IV INFUSION ADDL HR: CPT

## 2020-10-07 PROCEDURE — 96413 CHEMO IV INFUSION 1 HR: CPT

## 2020-10-07 PROCEDURE — 96375 TX/PRO/DX INJ NEW DRUG ADDON: CPT

## 2020-10-07 NOTE — PROGRESS NOTES
Madelyn to infusion today for Q8 week Remicade. Pt states her bowel issues are stable now, no complaints of bloody stools. No changes in medications or allergies. Patient given pre-med of Solucortef 200mg IVP. PIV started to right FA.  Remicade given p

## 2020-10-08 ENCOUNTER — APPOINTMENT (OUTPATIENT)
Dept: HEMATOLOGY/ONCOLOGY | Facility: HOSPITAL | Age: 70
End: 2020-10-08
Attending: INTERNAL MEDICINE
Payer: MEDICARE

## 2020-12-02 ENCOUNTER — OFFICE VISIT (OUTPATIENT)
Dept: HEMATOLOGY/ONCOLOGY | Facility: HOSPITAL | Age: 70
End: 2020-12-02
Attending: INTERNAL MEDICINE
Payer: MEDICARE

## 2020-12-02 VITALS
DIASTOLIC BLOOD PRESSURE: 64 MMHG | OXYGEN SATURATION: 97 % | RESPIRATION RATE: 16 BRPM | SYSTOLIC BLOOD PRESSURE: 116 MMHG | BODY MASS INDEX: 23 KG/M2 | HEART RATE: 70 BPM | WEIGHT: 149 LBS | TEMPERATURE: 98 F

## 2020-12-02 DIAGNOSIS — K51.011 ULCERATIVE PANCOLITIS WITH RECTAL BLEEDING (HCC): Primary | ICD-10-CM

## 2020-12-02 PROCEDURE — 96413 CHEMO IV INFUSION 1 HR: CPT

## 2020-12-02 PROCEDURE — 96375 TX/PRO/DX INJ NEW DRUG ADDON: CPT

## 2020-12-02 PROCEDURE — 96415 CHEMO IV INFUSION ADDL HR: CPT

## 2020-12-02 NOTE — PROGRESS NOTES
Madelyn to infusion today for Q8 week Remicade. Pt states her bowel issues are stable now, no complaints of bloody stools. No changes in medications or allergies. Patient given pre-med of Solucortef 200mg IVP. PIV started to left FA.  Remicade given pe

## 2020-12-17 ENCOUNTER — HOSPITAL ENCOUNTER (OUTPATIENT)
Dept: MAMMOGRAPHY | Facility: HOSPITAL | Age: 70
Discharge: HOME | End: 2020-12-17
Attending: INTERNAL MEDICINE
Payer: MEDICARE

## 2020-12-17 DIAGNOSIS — Z12.31 ENCOUNTER FOR SCREENING MAMMOGRAM FOR MALIGNANT NEOPLASM OF BREAST: ICD-10-CM

## 2020-12-17 PROCEDURE — 77063 BREAST TOMOSYNTHESIS BI: CPT | Performed by: INTERNAL MEDICINE

## 2020-12-17 PROCEDURE — 77067 SCR MAMMO BI INCL CAD: CPT | Performed by: INTERNAL MEDICINE

## 2021-01-11 ENCOUNTER — TELEPHONE (OUTPATIENT)
Dept: GASTROENTEROLOGY | Facility: CLINIC | Age: 71
End: 2021-01-11

## 2021-01-11 DIAGNOSIS — K51.919 ULCERATIVE COLITIS WITH COMPLICATION, UNSPECIFIED LOCATION (HCC): Primary | ICD-10-CM

## 2021-01-11 RX ORDER — MESALAMINE 400 MG/1
CAPSULE, DELAYED RELEASE ORAL
Qty: 180 CAPSULE | Refills: 0 | Status: SHIPPED | OUTPATIENT
Start: 2021-01-11 | End: 2021-05-27

## 2021-01-11 NOTE — TELEPHONE ENCOUNTER
Requested Prescriptions     Pending Prescriptions Disp Refills   • MESALAMINE 400 MG Oral Capsule Delayed Release [Pharmacy Med Name: MESALAMINE  MG CAPSULE] 180 capsule 0     Sig: TAKE 2 CAPSULES(800 MG) BY MOUTH THREE TIMES DAILY BEFORE MEALS     L

## 2021-01-11 NOTE — TELEPHONE ENCOUNTER
Pt contacted by phone for f/u. Refill sent e-scribe. Last labs 11/2019. Placed orders for lrepeat labs including quant gold and hep b testing as she is on remicade.     F/u with primary attending md.

## 2021-01-27 ENCOUNTER — OFFICE VISIT (OUTPATIENT)
Dept: HEMATOLOGY/ONCOLOGY | Facility: HOSPITAL | Age: 71
End: 2021-01-27
Attending: INTERNAL MEDICINE
Payer: MEDICARE

## 2021-01-27 ENCOUNTER — LAB ENCOUNTER (OUTPATIENT)
Dept: LAB | Facility: HOSPITAL | Age: 71
End: 2021-01-27
Attending: NURSE PRACTITIONER
Payer: MEDICARE

## 2021-01-27 VITALS
HEART RATE: 70 BPM | RESPIRATION RATE: 16 BRPM | TEMPERATURE: 99 F | BODY MASS INDEX: 23 KG/M2 | SYSTOLIC BLOOD PRESSURE: 113 MMHG | DIASTOLIC BLOOD PRESSURE: 57 MMHG | WEIGHT: 154 LBS | OXYGEN SATURATION: 94 %

## 2021-01-27 DIAGNOSIS — K51.919 ULCERATIVE COLITIS WITH COMPLICATION, UNSPECIFIED LOCATION (HCC): ICD-10-CM

## 2021-01-27 DIAGNOSIS — K51.011 ULCERATIVE PANCOLITIS WITH RECTAL BLEEDING (HCC): Primary | ICD-10-CM

## 2021-01-27 DIAGNOSIS — K51.919 CHRONIC ULCERATIVE COLITIS, UNSPECIFIED COMPLICATION (HCC): Primary | ICD-10-CM

## 2021-01-27 LAB
ALBUMIN SERPL-MCNC: 3.7 G/DL (ref 3.4–5)
ALBUMIN/GLOB SERPL: 0.9 {RATIO} (ref 1–2)
ALP LIVER SERPL-CCNC: 62 U/L
ALT SERPL-CCNC: 38 U/L
ANION GAP SERPL CALC-SCNC: 8 MMOL/L (ref 0–18)
AST SERPL-CCNC: 25 U/L (ref 15–37)
BASOPHILS # BLD AUTO: 0.04 X10(3) UL (ref 0–0.2)
BASOPHILS NFR BLD AUTO: 0.7 %
BILIRUB SERPL-MCNC: 0.3 MG/DL (ref 0.1–2)
BUN BLD-MCNC: 18 MG/DL (ref 7–18)
BUN/CREAT SERPL: 30.5 (ref 10–20)
CALCIUM BLD-MCNC: 9.2 MG/DL (ref 8.5–10.1)
CHLORIDE SERPL-SCNC: 106 MMOL/L (ref 98–112)
CO2 SERPL-SCNC: 27 MMOL/L (ref 21–32)
CREAT BLD-MCNC: 0.59 MG/DL
CRP SERPL-MCNC: <0.29 MG/DL (ref ?–0.3)
DEPRECATED RDW RBC AUTO: 48.7 FL (ref 35.1–46.3)
EOSINOPHIL # BLD AUTO: 0.15 X10(3) UL (ref 0–0.7)
EOSINOPHIL NFR BLD AUTO: 2.8 %
ERYTHROCYTE [DISTWIDTH] IN BLOOD BY AUTOMATED COUNT: 13.9 % (ref 11–15)
GLOBULIN PLAS-MCNC: 4.3 G/DL (ref 2.8–4.4)
GLUCOSE BLD-MCNC: 79 MG/DL (ref 70–99)
HBV CORE AB SERPL QL IA: NONREACTIVE
HBV SURFACE AB SER QL: NONREACTIVE
HBV SURFACE AB SERPL IA-ACNC: <3.1 MIU/ML
HBV SURFACE AG SER-ACNC: <0.1 [IU]/L
HBV SURFACE AG SERPL QL IA: NONREACTIVE
HCT VFR BLD AUTO: 37.7 %
HGB BLD-MCNC: 12.7 G/DL
IMM GRANULOCYTES # BLD AUTO: 0.01 X10(3) UL (ref 0–1)
IMM GRANULOCYTES NFR BLD: 0.2 %
LYMPHOCYTES # BLD AUTO: 1.53 X10(3) UL (ref 1–4)
LYMPHOCYTES NFR BLD AUTO: 28.5 %
M PROTEIN MFR SERPL ELPH: 8 G/DL (ref 6.4–8.2)
MCH RBC QN AUTO: 31.8 PG (ref 26–34)
MCHC RBC AUTO-ENTMCNC: 33.7 G/DL (ref 31–37)
MCV RBC AUTO: 94.5 FL
MONOCYTES # BLD AUTO: 0.62 X10(3) UL (ref 0.1–1)
MONOCYTES NFR BLD AUTO: 11.5 %
NEUTROPHILS # BLD AUTO: 3.02 X10 (3) UL (ref 1.5–7.7)
NEUTROPHILS # BLD AUTO: 3.02 X10(3) UL (ref 1.5–7.7)
NEUTROPHILS NFR BLD AUTO: 56.3 %
OSMOLALITY SERPL CALC.SUM OF ELEC: 293 MOSM/KG (ref 275–295)
PATIENT FASTING Y/N/NP: NO
PLATELET # BLD AUTO: 249 10(3)UL (ref 150–450)
POTASSIUM SERPL-SCNC: 3.8 MMOL/L (ref 3.5–5.1)
RBC # BLD AUTO: 3.99 X10(6)UL
SODIUM SERPL-SCNC: 141 MMOL/L (ref 136–145)
VIT B12 SERPL-MCNC: 361 PG/ML (ref 193–986)
WBC # BLD AUTO: 5.4 X10(3) UL (ref 4–11)

## 2021-01-27 PROCEDURE — 86140 C-REACTIVE PROTEIN: CPT

## 2021-01-27 PROCEDURE — 96415 CHEMO IV INFUSION ADDL HR: CPT

## 2021-01-27 PROCEDURE — 36415 COLL VENOUS BLD VENIPUNCTURE: CPT

## 2021-01-27 PROCEDURE — 87340 HEPATITIS B SURFACE AG IA: CPT

## 2021-01-27 PROCEDURE — 82306 VITAMIN D 25 HYDROXY: CPT

## 2021-01-27 PROCEDURE — 82607 VITAMIN B-12: CPT

## 2021-01-27 PROCEDURE — 86480 TB TEST CELL IMMUN MEASURE: CPT

## 2021-01-27 PROCEDURE — 80053 COMPREHEN METABOLIC PANEL: CPT

## 2021-01-27 PROCEDURE — 86704 HEP B CORE ANTIBODY TOTAL: CPT

## 2021-01-27 PROCEDURE — 86706 HEP B SURFACE ANTIBODY: CPT

## 2021-01-27 PROCEDURE — 85025 COMPLETE CBC W/AUTO DIFF WBC: CPT

## 2021-01-27 PROCEDURE — 96413 CHEMO IV INFUSION 1 HR: CPT

## 2021-01-27 PROCEDURE — 96375 TX/PRO/DX INJ NEW DRUG ADDON: CPT

## 2021-01-29 LAB
25(OH)D3 SERPL-MCNC: 25.1 NG/ML (ref 30–100)
M TB IFN-G CD4+ T-CELLS BLD-ACNC: 0.04 IU/ML
M TB TUBERC IFN-G BLD QL: NEGATIVE
M TB TUBERC IGNF/MITOGEN IGNF CONTROL: 7.09 IU/ML
QUANTIFERON TB1 MINUS NIL: 0 IU/ML
QUANTIFERON TB2 MINUS NIL: 0 IU/ML

## 2021-02-08 ENCOUNTER — TELEPHONE (OUTPATIENT)
Dept: GASTROENTEROLOGY | Facility: CLINIC | Age: 71
End: 2021-02-08

## 2021-02-08 DIAGNOSIS — E55.9 VITAMIN D DEFICIENCY: Primary | ICD-10-CM

## 2021-02-08 RX ORDER — ERGOCALCIFEROL 1.25 MG/1
50000 CAPSULE ORAL
Qty: 4 CAPSULE | Refills: 0 | Status: SHIPPED | OUTPATIENT
Start: 2021-02-08 | End: 2021-03-02

## 2021-02-08 NOTE — TELEPHONE ENCOUNTER
She has been on otc d supplement daily and vit d level 25.1  Will plan to take prescription strength once weekly x 4 weeks and repeat in 8 weeks. Recommend hep b vaccine with PCP.

## 2021-03-08 RX ORDER — ERGOCALCIFEROL 1.25 MG/1
50000 CAPSULE ORAL
Qty: 4 CAPSULE | Refills: 0 | OUTPATIENT
Start: 2021-03-08 | End: 2021-03-30

## 2021-03-12 DIAGNOSIS — Z23 NEED FOR VACCINATION: ICD-10-CM

## 2021-03-22 ENCOUNTER — TELEPHONE (OUTPATIENT)
Dept: INTERNAL MEDICINE CLINIC | Facility: CLINIC | Age: 71
End: 2021-03-22

## 2021-03-22 NOTE — TELEPHONE ENCOUNTER
Please call patient. I received a request from her orthopedist for medical clearance prior to her planned surgery March 30. Unfortunately, I will not be able to clear her because I will not be in the office.   Please schedule patient with another physicia

## 2021-03-23 NOTE — TELEPHONE ENCOUNTER
Is Dr. Robert Meyers a primary care doctor? Pt should call her orthopedist and advise them of that fact so they can fax and receive the clearance. They sent it to me under the assumption that I will be doing it. Also, has she changed doctors permanently?   I hav

## 2021-03-23 NOTE — TELEPHONE ENCOUNTER
Per patient she has an appointment with Dr Isidro Abreu at [facility] in [facility] Tel#[phone] today and doctor will do also her  Pre-op.

## 2021-03-23 NOTE — TELEPHONE ENCOUNTER
Spoke with patient and she states that Dr. Desmond Weaver is the doctor that is going to do her pre-op clearance. She states that there were no appointments available with Dr. Mirlande Mcdonough. Patient just wanted to inform you.

## 2021-03-24 ENCOUNTER — OFFICE VISIT (OUTPATIENT)
Dept: HEMATOLOGY/ONCOLOGY | Facility: HOSPITAL | Age: 71
End: 2021-03-24
Attending: INTERNAL MEDICINE
Payer: MEDICARE

## 2021-03-24 VITALS
DIASTOLIC BLOOD PRESSURE: 51 MMHG | TEMPERATURE: 98 F | SYSTOLIC BLOOD PRESSURE: 108 MMHG | RESPIRATION RATE: 16 BRPM | WEIGHT: 155.38 LBS | BODY MASS INDEX: 24 KG/M2 | HEART RATE: 74 BPM

## 2021-03-24 DIAGNOSIS — K51.011 ULCERATIVE PANCOLITIS WITH RECTAL BLEEDING (HCC): Primary | ICD-10-CM

## 2021-03-24 PROCEDURE — 96413 CHEMO IV INFUSION 1 HR: CPT

## 2021-03-24 PROCEDURE — 96365 THER/PROPH/DIAG IV INF INIT: CPT

## 2021-03-24 PROCEDURE — 96366 THER/PROPH/DIAG IV INF ADDON: CPT

## 2021-03-24 PROCEDURE — 96415 CHEMO IV INFUSION ADDL HR: CPT

## 2021-03-24 PROCEDURE — 96375 TX/PRO/DX INJ NEW DRUG ADDON: CPT

## 2021-03-24 NOTE — PROGRESS NOTES
Patient arrives for Remicade for ulcerative colitis. Reports she is well. Denies any recent fevers. Premedication given. PIV started in left forearm, positive blood return noted. Remicade given over two hours, patient tolerated well.  PIV removed, site cov

## 2021-03-26 ENCOUNTER — OFFICE VISIT (OUTPATIENT)
Dept: FAMILY MEDICINE CLINIC | Facility: CLINIC | Age: 71
End: 2021-03-26
Payer: MEDICARE

## 2021-03-26 ENCOUNTER — NURSE TRIAGE (OUTPATIENT)
Dept: INTERNAL MEDICINE CLINIC | Facility: CLINIC | Age: 71
End: 2021-03-26

## 2021-03-26 VITALS
SYSTOLIC BLOOD PRESSURE: 114 MMHG | WEIGHT: 156 LBS | TEMPERATURE: 98 F | HEIGHT: 68 IN | DIASTOLIC BLOOD PRESSURE: 66 MMHG | BODY MASS INDEX: 23.64 KG/M2 | HEART RATE: 71 BPM

## 2021-03-26 DIAGNOSIS — M54.50 ACUTE LEFT-SIDED LOW BACK PAIN WITHOUT SCIATICA: Primary | ICD-10-CM

## 2021-03-26 LAB
APPEARANCE: CLEAR
BILIRUBIN: NEGATIVE
GLUCOSE (URINE DIPSTICK): NEGATIVE MG/DL
LEUKOCYTES: NEGATIVE
MULTISTIX LOT#: 5077 NUMERIC
NITRITE, URINE: NEGATIVE
OCCULT BLOOD: NEGATIVE
PH, URINE: 5.5 (ref 4.5–8)
SPECIFIC GRAVITY: 1 (ref 1–1.03)
UROBILINOGEN,SEMI-QN: 1.9 MG/DL (ref 0–1.9)

## 2021-03-26 PROCEDURE — 99213 OFFICE O/P EST LOW 20 MIN: CPT | Performed by: STUDENT IN AN ORGANIZED HEALTH CARE EDUCATION/TRAINING PROGRAM

## 2021-03-26 PROCEDURE — 81002 URINALYSIS NONAUTO W/O SCOPE: CPT | Performed by: STUDENT IN AN ORGANIZED HEALTH CARE EDUCATION/TRAINING PROGRAM

## 2021-03-26 RX ORDER — METHOCARBAMOL 500 MG/1
500 TABLET, FILM COATED ORAL 2 TIMES DAILY PRN
Qty: 30 TABLET | Refills: 0 | Status: SHIPPED | OUTPATIENT
Start: 2021-03-26

## 2021-03-26 NOTE — TELEPHONE ENCOUNTER
Covering for Dr. Tisha Ortega  who is on-call  Difficult to say with this pain is about so would definitely recommend immediate evaluation even if she does not go to the ER--either first available provider, urgent care etc.

## 2021-03-26 NOTE — PROGRESS NOTES
HPI:    Patient ID: Froy Lainez is a 72year old female. HPI  Pt presenting with Left sided back pain. She reports intermittent back pain on Left side that wraps around flank for the last few days.  She denies any fevers, chills, nausea, vomiting, d HENT:      Head: Normocephalic and atraumatic. Eyes:      Conjunctiva/sclera: Conjunctivae normal.   Cardiovascular:      Rate and Rhythm: Normal rate and regular rhythm. Pulses: Normal pulses.       Heart sounds: Normal heart sounds, S1 normal and S understanding and agrees with plan.   Orders Placed This Encounter      POC Urinalysis, Manual Dip without microscopy [[id number]]      Meds This Visit:  Requested Prescriptions     Signed Prescriptions Disp Refills   • methocarbamol 500 MG Oral Tab 30 tablet 0

## 2021-03-26 NOTE — TELEPHONE ENCOUNTER
Patient was called. Advised of on-call's recommendations  Agreed to make in office visit.  Appointment scheduled with Dr. Monique Oneal   Date Time Provider Nadine Serrano   3/26/2021  1:00 PM Polo Shook MD [facility]

## 2021-03-26 NOTE — TELEPHONE ENCOUNTER
Action Requested: Summary for Provider     []  Critical Lab, Recommendations Needed  [x] Need Additional Advice  []   FYI    []   Need Orders  [] Need Medications Sent to Pharmacy  []  Other     SUMMARY: Per protocol disposition advised ER now; pt states d

## 2021-04-12 ENCOUNTER — TELEPHONE (OUTPATIENT)
Dept: GASTROENTEROLOGY | Facility: CLINIC | Age: 71
End: 2021-04-12

## 2021-05-19 ENCOUNTER — TELEPHONE (OUTPATIENT)
Dept: GASTROENTEROLOGY | Facility: CLINIC | Age: 71
End: 2021-05-19

## 2021-05-19 ENCOUNTER — PATIENT MESSAGE (OUTPATIENT)
Dept: GASTROENTEROLOGY | Facility: CLINIC | Age: 71
End: 2021-05-19

## 2021-05-19 ENCOUNTER — OFFICE VISIT (OUTPATIENT)
Dept: HEMATOLOGY/ONCOLOGY | Facility: HOSPITAL | Age: 71
End: 2021-05-19
Attending: INTERNAL MEDICINE
Payer: MEDICARE

## 2021-05-19 VITALS
RESPIRATION RATE: 16 BRPM | OXYGEN SATURATION: 98 % | WEIGHT: 155.19 LBS | BODY MASS INDEX: 24 KG/M2 | TEMPERATURE: 98 F | DIASTOLIC BLOOD PRESSURE: 64 MMHG | SYSTOLIC BLOOD PRESSURE: 109 MMHG | HEART RATE: 74 BPM

## 2021-05-19 DIAGNOSIS — K51.011 ULCERATIVE PANCOLITIS WITH RECTAL BLEEDING (HCC): Primary | ICD-10-CM

## 2021-05-19 DIAGNOSIS — K51.919 CHRONIC ULCERATIVE COLITIS WITH COMPLICATION, UNSPECIFIED LOCATION (HCC): Primary | ICD-10-CM

## 2021-05-19 PROCEDURE — 96375 TX/PRO/DX INJ NEW DRUG ADDON: CPT

## 2021-05-19 PROCEDURE — 96413 CHEMO IV INFUSION 1 HR: CPT

## 2021-05-19 PROCEDURE — 96415 CHEMO IV INFUSION ADDL HR: CPT

## 2021-05-19 NOTE — TELEPHONE ENCOUNTER
Dr. Bushra Hoyt-    Please see patients message below and advise. Remicade pa expires soon and will need to submit new pa. Patient inquiring if continuation on medication appropriate? Please advise prior to starting prior authorization process.  Pended ne

## 2021-05-19 NOTE — TELEPHONE ENCOUNTER
Dr. Bib Crowe-    Pended new remicade orders to submit new pa, as per patient message remicade exiting orders  after infusion today. Please review and sign if appropriate.      Per ov 2020:     \"Patient wants to deescalate her treatment for UC

## 2021-05-19 NOTE — TELEPHONE ENCOUNTER
From: Letty Olsen  To: Olaf Kelly MD  Sent: 5/19/2021 3:58 PM CDT  Subject: Visit Maryjean Coffin Dr Garrison Essex,  I am getting my infusion right now & the nurse has informed me that she cannot schedule my next appt because the Rx expires with

## 2021-05-20 NOTE — TELEPHONE ENCOUNTER
Discussed with patient remicade infusions are appropriate to continue at this time per provider. Informed new prior authorization is needed due to original approval will  soon. Advised submitting pa now and will keep her updated on status.      Pa

## 2021-05-20 NOTE — TELEPHONE ENCOUNTER
Sent patient MyChart to update on new remicade orders and to call our office if she has any further questions or concerns.

## 2021-05-20 NOTE — TELEPHONE ENCOUNTER
PPD Team-    Please assist with new pa for remicade infusions at [facility] (Q8 weeks). Medication PA Requested: Remicade (infliximab)    Pt Insurance/Number to Contact: Primary: Medicare (Part B Only);  Secondary: [id number] ID# and Group

## 2021-05-20 NOTE — TELEPHONE ENCOUNTER
Patient has Medicare primary, BCBS supplemental insurance per [address] No PA required. Referral has been authorized.

## 2021-05-20 NOTE — TELEPHONE ENCOUNTER
Faxed remicade pa approved referral, paper order and patient insurance information to [address] [place] [address] (F: [id number]). Fax confirmation received. Patient received last infusion: 05/19/2021. Due next: 07/14/2021.      Sent remicade orders to sc

## 2021-05-27 ENCOUNTER — PATIENT MESSAGE (OUTPATIENT)
Dept: GASTROENTEROLOGY | Facility: CLINIC | Age: 71
End: 2021-05-27

## 2021-05-27 RX ORDER — MESALAMINE 400 MG/1
CAPSULE, DELAYED RELEASE ORAL
Qty: 180 CAPSULE | Refills: 0 | Status: SHIPPED | OUTPATIENT
Start: 2021-05-27

## 2021-05-27 NOTE — TELEPHONE ENCOUNTER
Dr. Shabnam Corley-    Please sign pended mesalamine rx refill. Patient sent mychart following up on this. Thank you!

## 2021-05-27 NOTE — TELEPHONE ENCOUNTER
From: Reddy Martino  To: Doreen Velarde MD  Sent: 5/27/2021 1:05 PM CDT  Subject: Prescription Question    [facility] has processed a Rx refill for my Mesalamine & requesting for you to approve ASAP.  Thank you

## 2021-06-09 NOTE — TELEPHONE ENCOUNTER
Called [facility] at [phone] spoke w/Michelle who stated no prior Vassie Marchi is required for 23 hour observation. Ref [id number]. Predetermination completed clinical info routed to [phone]. Predetermination form sent to scanning.
DENICE CALLING FORM [facility] / TO CHECK THE STATUS ON THE COMM DATED 09/26/17 REGARDING SCHEDULING AN SURGERY FOR PT / PLS ADV
Has appt w me on Friday -- need Dr. Fatimah Clark letter re: what she wants to do
Lucia has a two hour case at 8:30am and Colt (Lucia's )  states she can join your case after. Just want to confirm, I should note another assist for your part of the procedure. Correct?
Patient is scheduled 11/6/17 7:30 TLH/BSO NJG/CAP requested. Pat orders routed. Instructions routed via [employer].
Patient wants to know if an infusion she is scheduled for 10/18/17 will interfere with the procedure scheduled 11/6/17?
Per Osborn Duverney at [facility] you are to perform a TLH and Cadenceaitis is to do the anterior/posterior repair and a sling. Please router order.
Please schedule the following surgery:    Procedure: TLH/BSO, possible TVH/BSO in conjunction with Dr Alex Carvajal case    Date: Nov 6th    Diagnosis: pelvic relaxation    Admission:Day surgery    Anesth: general    Preop Medical Clearance needed:  Yes    Kenji
Pt notified of [facility]'s message below and pt verbalized understanding. Pt states she did have a physical with her PCP for medical clearance and just needs to do an EKG and was advised to do it 2 weeks before surgery.  Advised pt to call us back once her EKG is
Sent to Surgery Nurse to address.
She medical clearance & needs to discuss those issues w/ PCP or MD ordering infusions
Spoke to patient informed her, I will route her message to [facility] and follow up with her as soon as I here back.
Tentative date is 11/6/17. Please route order.
Trying to coordinate a date for Dr Sudheer Carney and Dr. Kirill Álvarez for a hysterectomy procedure.  pls return call
You need to find out how quickly does Dr. Roxy [place] / Patient want surgery -- would like to know what procedure Dr. Roxy [place] plans -- options are TLH or TVH on my end.  Once we have that info, then I can meet w/ pt to discuss my end of surgery
Note Text (......Xxx Chief Complaint.): This diagnosis correlates with the
Detail Level: Detailed
Other (Free Text): rbc's sommer discussed\\ntumor risk- breast ca\\nno hx renal disease- will check labs\\nno asa advil aleve

## 2021-07-14 ENCOUNTER — OFFICE VISIT (OUTPATIENT)
Dept: HEMATOLOGY/ONCOLOGY | Facility: HOSPITAL | Age: 71
End: 2021-07-14
Attending: INTERNAL MEDICINE
Payer: MEDICARE

## 2021-07-14 VITALS
WEIGHT: 155.81 LBS | TEMPERATURE: 99 F | DIASTOLIC BLOOD PRESSURE: 63 MMHG | OXYGEN SATURATION: 97 % | HEART RATE: 73 BPM | BODY MASS INDEX: 24 KG/M2 | SYSTOLIC BLOOD PRESSURE: 108 MMHG | RESPIRATION RATE: 16 BRPM

## 2021-07-14 DIAGNOSIS — K51.011 ULCERATIVE PANCOLITIS WITH RECTAL BLEEDING (HCC): Primary | ICD-10-CM

## 2021-07-14 PROCEDURE — 96413 CHEMO IV INFUSION 1 HR: CPT

## 2021-07-14 PROCEDURE — 96375 TX/PRO/DX INJ NEW DRUG ADDON: CPT

## 2021-07-14 PROCEDURE — 96415 CHEMO IV INFUSION ADDL HR: CPT

## 2021-07-14 NOTE — PROGRESS NOTES
Pt to infusion for q8 week Remicade to treat ulcerative colitis. Arrived ambulating independently. Feeling well, offers no complaints. Denies fevers, chills or s/s of infection.  Pre-medicated with solu-cortef IVP as ordered.     Infusion given over 2 hours

## 2021-08-10 ENCOUNTER — TELEPHONE (OUTPATIENT)
Dept: GASTROENTEROLOGY | Facility: CLINIC | Age: 71
End: 2021-08-10

## 2021-08-10 DIAGNOSIS — K52.9 IBD (INFLAMMATORY BOWEL DISEASE): ICD-10-CM

## 2021-08-10 DIAGNOSIS — Z79.899 INFLIXIMAB (REMICADE) LONG-TERM USE: Primary | ICD-10-CM

## 2021-08-10 NOTE — TELEPHONE ENCOUNTER
Informed Vesta Dark on active lab orders and to complete prior to schedule follow up tomorrow. Reviewed orders placed. Voiced understanding and will complete as instructed.

## 2021-08-10 NOTE — TELEPHONE ENCOUNTER
Dr. Henry Garcia,     Patient scheduled for follow up tomorrow, 08/11/2021. Inquiring if any labs needed prior to visit. On remicade and last quant gold and hep b completed 01/27/2021.      Thank you

## 2021-08-11 ENCOUNTER — OFFICE VISIT (OUTPATIENT)
Dept: GASTROENTEROLOGY | Facility: CLINIC | Age: 71
End: 2021-08-11
Payer: MEDICARE

## 2021-08-11 ENCOUNTER — LAB ENCOUNTER (OUTPATIENT)
Dept: LAB | Facility: HOSPITAL | Age: 71
End: 2021-08-11
Attending: INTERNAL MEDICINE
Payer: MEDICARE

## 2021-08-11 ENCOUNTER — TELEPHONE (OUTPATIENT)
Dept: GASTROENTEROLOGY | Facility: CLINIC | Age: 71
End: 2021-08-11

## 2021-08-11 VITALS
HEIGHT: 68 IN | DIASTOLIC BLOOD PRESSURE: 64 MMHG | SYSTOLIC BLOOD PRESSURE: 120 MMHG | BODY MASS INDEX: 24.25 KG/M2 | WEIGHT: 160 LBS

## 2021-08-11 DIAGNOSIS — K64.8 INTERNAL HEMORRHOIDS: ICD-10-CM

## 2021-08-11 DIAGNOSIS — K52.9 IBD (INFLAMMATORY BOWEL DISEASE): ICD-10-CM

## 2021-08-11 DIAGNOSIS — L88 PYODERMA GANGRENOSUM: ICD-10-CM

## 2021-08-11 DIAGNOSIS — E55.9 VITAMIN D DEFICIENCY: ICD-10-CM

## 2021-08-11 DIAGNOSIS — K51.019 CHRONIC ULCERATIVE PANCOLITIS WITH COMPLICATION (HCC): Primary | ICD-10-CM

## 2021-08-11 DIAGNOSIS — Z79.899 INFLIXIMAB (REMICADE) LONG-TERM USE: ICD-10-CM

## 2021-08-11 LAB
ALBUMIN SERPL-MCNC: 3.6 G/DL (ref 3.4–5)
ALBUMIN/GLOB SERPL: 0.8 {RATIO} (ref 1–2)
ALP LIVER SERPL-CCNC: 66 U/L
ALT SERPL-CCNC: 30 U/L
ANION GAP SERPL CALC-SCNC: 2 MMOL/L (ref 0–18)
AST SERPL-CCNC: 24 U/L (ref 15–37)
BILIRUB SERPL-MCNC: 0.3 MG/DL (ref 0.1–2)
BUN BLD-MCNC: 16 MG/DL (ref 7–18)
BUN/CREAT SERPL: 23.5 (ref 10–20)
CALCIUM BLD-MCNC: 8.9 MG/DL (ref 8.5–10.1)
CHLORIDE SERPL-SCNC: 106 MMOL/L (ref 98–112)
CO2 SERPL-SCNC: 30 MMOL/L (ref 21–32)
CREAT BLD-MCNC: 0.68 MG/DL
CRP SERPL-MCNC: <0.29 MG/DL (ref ?–0.3)
DEPRECATED RDW RBC AUTO: 46.1 FL (ref 35.1–46.3)
ERYTHROCYTE [DISTWIDTH] IN BLOOD BY AUTOMATED COUNT: 13.6 % (ref 11–15)
ERYTHROCYTE [SEDIMENTATION RATE] IN BLOOD: 49 MM/HR
GLOBULIN PLAS-MCNC: 4.4 G/DL (ref 2.8–4.4)
GLUCOSE BLD-MCNC: 86 MG/DL (ref 70–99)
HCT VFR BLD AUTO: 36.8 %
HGB BLD-MCNC: 12.3 G/DL
M PROTEIN MFR SERPL ELPH: 8 G/DL (ref 6.4–8.2)
MCH RBC QN AUTO: 31 PG (ref 26–34)
MCHC RBC AUTO-ENTMCNC: 33.4 G/DL (ref 31–37)
MCV RBC AUTO: 92.7 FL
OSMOLALITY SERPL CALC.SUM OF ELEC: 286 MOSM/KG (ref 275–295)
PATIENT FASTING Y/N/NP: NO
PLATELET # BLD AUTO: 244 10(3)UL (ref 150–450)
POTASSIUM SERPL-SCNC: 4.4 MMOL/L (ref 3.5–5.1)
RBC # BLD AUTO: 3.97 X10(6)UL
SODIUM SERPL-SCNC: 138 MMOL/L (ref 136–145)
VIT D+METAB SERPL-MCNC: 28.1 NG/ML (ref 30–100)
WBC # BLD AUTO: 5 X10(3) UL (ref 4–11)

## 2021-08-11 PROCEDURE — 36415 COLL VENOUS BLD VENIPUNCTURE: CPT

## 2021-08-11 PROCEDURE — 86140 C-REACTIVE PROTEIN: CPT

## 2021-08-11 PROCEDURE — 82306 VITAMIN D 25 HYDROXY: CPT

## 2021-08-11 PROCEDURE — 80053 COMPREHEN METABOLIC PANEL: CPT

## 2021-08-11 PROCEDURE — 85652 RBC SED RATE AUTOMATED: CPT

## 2021-08-11 PROCEDURE — 99214 OFFICE O/P EST MOD 30 MIN: CPT | Performed by: INTERNAL MEDICINE

## 2021-08-11 PROCEDURE — 85027 COMPLETE CBC AUTOMATED: CPT

## 2021-08-11 RX ORDER — SODIUM, POTASSIUM,MAG SULFATES 17.5-3.13G
SOLUTION, RECONSTITUTED, ORAL ORAL
Qty: 1 EACH | Refills: 0 | Status: SHIPPED | OUTPATIENT
Start: 2021-08-11

## 2021-08-11 NOTE — PROGRESS NOTES
[address] Gastroenterology  Clinic Follow-up Visit    Patient presents with:   Follow - Up: have develop red bumps on skin - pharmacist told pt may be gut related; pt also states joints will swell at times        Subjective/HPI:   Eda Sacks i check a Remicade level prior to the next dose. A timeout was discussed given risk of monotherapy to give IV hydrocortisone 200 mg prior to each infusion to decrease the risk of immunogenicity.      She now presents if she is due for renewal for remicade. advanced without difficulty to the cecum using the CO2 insufflation technique. The cecum was identified by localizing the trifold, the appendix and the ileocecal valve.  Withdrawal was begun with thorough washing and careful examination of the colonic walls                           D) - Colon descending, BX  R/o dysplasia and colitis                                                 E) - Sigmoid colon, BX  R/o dysplasia and colitis                                                    F) - Rectum,  BX  R/o dyspla some lesions on the body worried about from UC. Not typical of her pyoderma. Denies any GI symptoms of abdominal pain, nausea, vomiting, change in bowel habits, dyspepsia, dysphagia, hematemesis, hematochezia, or melena.  Patient has a bowel movement each d tobacco: Former User    Vaping Use      Vaping Use: Never used    Alcohol use:  Yes      Alcohol/week: 0.0 standard drinks      Comment: very minimally- one drink per weekend; socially    Drug use: No       Medications (Active prior to today's visit):  Curr moist  CV: regular rate and rhythm, the extremities are warm and well perfused   Lung: effort normal and breath sounds normal, no respiratory distress, wheezes or rales  GI: soft, non-tender exam in all quadrants without rigidity or guarding, non-distended all leading to prolonged hospitalization or surgical intervention. I also specifically mentioned the miss rate of colonoscopy of 5-10% in the best of all circumstances.   It is the patient's responsibility to contact his/her insurance company regarding Rubiaenid Form[place]

## 2021-08-11 NOTE — TELEPHONE ENCOUNTER
Scheduled for:  Colonoscopy 57555  Provider Name:  Dr. Kaiser Cerna  Date:  11/10/2021  Location:  [address] [place] [address] 1  Sedation:  MAC  Time:  7:30 am, arrival 6:30 am  Prep:  Trilyte  Meds/Allergies Reconciled?:  Physician reviewed  Diagnosis with codes:  Chronic ulcerative pa

## 2021-08-11 NOTE — PATIENT INSTRUCTIONS
1. Schedule colonoscopy with MAC sedation [Diagnosis: UC screening]    2.  bowel prep from pharmacy (split suprep or trilyte)    3.  Continue all medications for procedure except    ** If MAC @ [facility]:    - NO alcohol, recreational drugs nor erectile

## 2021-08-30 ENCOUNTER — PATIENT MESSAGE (OUTPATIENT)
Dept: INTERNAL MEDICINE CLINIC | Facility: CLINIC | Age: 71
End: 2021-08-30

## 2021-08-30 ENCOUNTER — NURSE TRIAGE (OUTPATIENT)
Dept: INTERNAL MEDICINE CLINIC | Facility: CLINIC | Age: 71
End: 2021-08-30

## 2021-08-30 NOTE — TELEPHONE ENCOUNTER
Action Requested: Summary for Provider     []  Critical Lab, Recommendations Needed  [] Need Additional Advice  []   FYI    []   Need Orders  [] Need Medications Sent to Pharmacy  []  Other     SUMMARY: pt going to IC for eval.    Pt states she fell last w

## 2021-08-30 NOTE — TELEPHONE ENCOUNTER
From: Victor Hugo Portillo  To: Seun Woods MD  Sent: 8/30/2021 12:36 AM CDT  Subject: Non-Urgent Louana Case Dr Gabrielle Lobato, I have had a pain whenever I move behind & under my right breast on the rib area for the last 6 days; noticed it around l

## 2021-08-30 NOTE — TELEPHONE ENCOUNTER
Please triage patient, MyChart message sent.    ----- Message from Tamar Shaffer sent at 8/30/2021 12:36 AM CDT -----  Regarding: Non-Urgent Medical Question  Contact: [phone]  Jade Finney,  I have had a pain whenever I move behind & under my

## 2021-09-01 NOTE — PROGRESS NOTES
Pt to infusion for q8 week Remicade to treat ulcerative colitis. Arrived ambulating independently. Feeling well, offers no complaints. Denies fevers, chills or s/s of infection.  Pre-medicated with solu-cortef IVP as ordered.     Infusion given over 2 hours PAST MEDICAL HISTORY:  Hypertension      PAST MEDICAL HISTORY:  No pertinent past medical history

## 2021-09-08 ENCOUNTER — OFFICE VISIT (OUTPATIENT)
Dept: HEMATOLOGY/ONCOLOGY | Facility: HOSPITAL | Age: 71
End: 2021-09-08
Attending: INTERNAL MEDICINE
Payer: MEDICARE

## 2021-09-08 VITALS
RESPIRATION RATE: 16 BRPM | BODY MASS INDEX: 25 KG/M2 | SYSTOLIC BLOOD PRESSURE: 127 MMHG | OXYGEN SATURATION: 93 % | HEART RATE: 73 BPM | WEIGHT: 163 LBS | DIASTOLIC BLOOD PRESSURE: 60 MMHG | TEMPERATURE: 98 F

## 2021-09-08 DIAGNOSIS — K51.011 ULCERATIVE PANCOLITIS WITH RECTAL BLEEDING (HCC): Primary | ICD-10-CM

## 2021-09-08 PROCEDURE — 96415 CHEMO IV INFUSION ADDL HR: CPT

## 2021-09-08 PROCEDURE — 96413 CHEMO IV INFUSION 1 HR: CPT

## 2021-09-08 PROCEDURE — 96375 TX/PRO/DX INJ NEW DRUG ADDON: CPT

## 2021-11-03 ENCOUNTER — OFFICE VISIT (OUTPATIENT)
Dept: HEMATOLOGY/ONCOLOGY | Facility: HOSPITAL | Age: 71
End: 2021-11-03
Attending: INTERNAL MEDICINE
Payer: MEDICARE

## 2021-11-03 VITALS
RESPIRATION RATE: 16 BRPM | SYSTOLIC BLOOD PRESSURE: 111 MMHG | HEART RATE: 75 BPM | WEIGHT: 164.81 LBS | BODY MASS INDEX: 25 KG/M2 | TEMPERATURE: 99 F | DIASTOLIC BLOOD PRESSURE: 68 MMHG | OXYGEN SATURATION: 96 %

## 2021-11-03 DIAGNOSIS — K51.011 ULCERATIVE PANCOLITIS WITH RECTAL BLEEDING (HCC): Primary | ICD-10-CM

## 2021-11-03 PROCEDURE — 96413 CHEMO IV INFUSION 1 HR: CPT

## 2021-11-03 PROCEDURE — 96415 CHEMO IV INFUSION ADDL HR: CPT

## 2021-11-03 PROCEDURE — 96375 TX/PRO/DX INJ NEW DRUG ADDON: CPT

## 2021-11-04 ENCOUNTER — TELEPHONE (OUTPATIENT)
Dept: GASTROENTEROLOGY | Facility: CLINIC | Age: 71
End: 2021-11-04

## 2021-11-04 ENCOUNTER — TELEPHONE (OUTPATIENT)
Dept: INTERNAL MEDICINE CLINIC | Facility: CLINIC | Age: 71
End: 2021-11-04

## 2021-11-04 DIAGNOSIS — Z12.31 SCREENING MAMMOGRAM FOR BREAST CANCER: Primary | ICD-10-CM

## 2021-11-04 NOTE — TELEPHONE ENCOUNTER
Pt scheduled an appointment for a physical on 01/13 and is requesting a mammogram order so she can schedule on the same day as her physical appointment.

## 2021-11-04 NOTE — TELEPHONE ENCOUNTER
Pt called to request that instructions for 11/11/21 colonoscopy be sent to her My Chart. Please call if any questions.

## 2021-11-04 NOTE — TELEPHONE ENCOUNTER
Routed to Dr Poole Sites for advise, thanks. Screening bilateral mammogram  order pended.      Future Appointments   Date Time Provider Nadine Serrano   11/10/2021  7:30 AM PHILIPPE, PROCEDURE ECWMOGIPROC None   12/29/2021  4:00 PM EM CC INFRN 1 EM CHEMO EMO

## 2021-11-05 NOTE — TELEPHONE ENCOUNTER
anson message with MD recommendation sent to pt.        Future Appointments   Date Time Provider Nadine Serrano   11/10/2021  7:30 AM PHILIPPE, PROCEDURE ECWMOGIPROC None   12/29/2021  4:00 PM EM CC INFRN 1 EM CHEMO EMO   1/13/2022 10:50 AM Linda Mohamud

## 2021-11-08 ENCOUNTER — TELEPHONE (OUTPATIENT)
Dept: GASTROENTEROLOGY | Facility: CLINIC | Age: 71
End: 2021-11-08

## 2021-11-08 DIAGNOSIS — K51.019 ULCERATIVE COLITIS, UNIVERSAL, UNSPECIFIED COMPLICATION (HCC): Primary | ICD-10-CM

## 2021-11-08 NOTE — TELEPHONE ENCOUNTER
Patient calling to Reschedule 11/10/2021  CLN - Monday, Tuesday or Wednesday morning. Please call. Thank you.

## 2021-11-08 NOTE — TELEPHONE ENCOUNTER
Scheduled for:  Colonoscopy [id number]  Provider Name:  Dr Claudia Clay  Date:  12/15/2021  Location:  [facility]  Sedation:  MAC  Time:  0730 (pt is aware to arrive at 0630)  Prep:  Colyte  Meds/Allergies Reconciled?:  Physician reviewed  Diagnosis with codes:  Chronic ulc

## 2021-11-23 NOTE — TELEPHONE ENCOUNTER
AMN Int # [id number]. Told pt that rx's have been sent to pharmacy and they should be ready for  in approximately 2 hrs. Pt was told to please present [employer] coupon which we texted to your phone, to your pharmacy to receive discounted price. AMN # [id number]. The pt was called again to give her the pt instructions from the provider.  Malvin Sloan RN IV immunoglobulin is not typically used to treat inflammatory bowel disease. No further recommendations. I am not certain whether her IVIG will interact with Remicade, but I doubt it.

## 2021-12-08 NOTE — PAT NURSING NOTE
This RN spoke to patient during PAT call and patient wishes to bring in her own COVID test. Per Endoscopy/Infection control policy, patient can use their own outside testing center/location as long as they meet the following requirements:     - physical ha

## 2021-12-15 ENCOUNTER — HOSPITAL ENCOUNTER (OUTPATIENT)
Age: 71
Setting detail: HOSPITAL OUTPATIENT SURGERY
Discharge: HOME | End: 2021-12-15
Attending: INTERNAL MEDICINE | Admitting: INTERNAL MEDICINE
Payer: MEDICARE

## 2021-12-15 ENCOUNTER — ANESTHESIA EVENT (OUTPATIENT)
Dept: ENDOSCOPY | Age: 71
End: 2021-12-15
Payer: MEDICARE

## 2021-12-15 ENCOUNTER — ANESTHESIA (OUTPATIENT)
Dept: ENDOSCOPY | Age: 71
End: 2021-12-15
Payer: MEDICARE

## 2021-12-15 VITALS
HEART RATE: 54 BPM | DIASTOLIC BLOOD PRESSURE: 66 MMHG | BODY MASS INDEX: 24.25 KG/M2 | WEIGHT: 160 LBS | RESPIRATION RATE: 16 BRPM | OXYGEN SATURATION: 98 % | SYSTOLIC BLOOD PRESSURE: 101 MMHG | HEIGHT: 68 IN

## 2021-12-15 DIAGNOSIS — K51.019 ULCERATIVE COLITIS, UNIVERSAL, UNSPECIFIED COMPLICATION (HCC): ICD-10-CM

## 2021-12-15 DIAGNOSIS — Z01.818 PRE-OP TESTING: Primary | ICD-10-CM

## 2021-12-15 PROCEDURE — 99070 SPECIAL SUPPLIES PHYS/QHP: CPT | Performed by: INTERNAL MEDICINE

## 2021-12-15 PROCEDURE — 45380 COLONOSCOPY AND BIOPSY: CPT | Performed by: INTERNAL MEDICINE

## 2021-12-15 RX ORDER — SODIUM CHLORIDE, SODIUM LACTATE, POTASSIUM CHLORIDE, CALCIUM CHLORIDE 600; 310; 30; 20 MG/100ML; MG/100ML; MG/100ML; MG/100ML
INJECTION, SOLUTION INTRAVENOUS CONTINUOUS
Status: DISCONTINUED | OUTPATIENT
Start: 2021-12-15 | End: 2021-12-15

## 2021-12-15 RX ORDER — LIDOCAINE HYDROCHLORIDE 10 MG/ML
INJECTION, SOLUTION EPIDURAL; INFILTRATION; INTRACAUDAL; PERINEURAL AS NEEDED
Status: DISCONTINUED | OUTPATIENT
Start: 2021-12-15 | End: 2021-12-15 | Stop reason: SURG

## 2021-12-15 RX ADMIN — LIDOCAINE HYDROCHLORIDE 50 MG: 10 INJECTION, SOLUTION EPIDURAL; INFILTRATION; INTRACAUDAL; PERINEURAL at 07:36:00

## 2021-12-15 NOTE — H&P
Pre Procedure History & Physical Examination    Patient Name: Brianna Owusu  MRN: [id number]  CSN: [id number]  YOB: 1950    Diagnosis: ulcerative colitis CRC surveillance     InFLIXimab (REMICADE IV), Inject into the vein., Disp: , Rfl:   N 11/06/2017    TVH/BSO/vag vault suspension / post repair   • EVERETT LOCALIZATION WIRE 1 SITE RIGHT (CPT=19281)      benign tumor removed 1967     Family History   Problem Relation Age of Onset   • Diabetes Father    • Pacemaker Father    • Heart Disease Fathe soft, non-tender exam in all quadrants without rigidity or guarding, non-distended, no abnormal bowel sounds noted, no masses are palpated  Skin: No jaundice  Ext: no cyanosis, clubbing or edema is evident.    Neuro: Alert and interactive, and gross movemen

## 2021-12-15 NOTE — ANESTHESIA POSTPROCEDURE EVALUATION
Patient: Telma Alas    Procedure Summary     Date: 12/15/21 Room / Location: [facility] ENDOSCOPY 01 / [facility] ENDO    Anesthesia Start: 0262 Anesthesia Stop: 0802    Procedure: COLONOSCOPY (N/A ) Diagnosis:       Ulcerative colitis, universal, unspecified

## 2021-12-15 NOTE — ANESTHESIA PREPROCEDURE EVALUATION
Anesthesia PreOp Note    HPI:     Joselito Castro is a 80year old female who presents for preoperative consultation requested by: Chasity Gavin MD    Date of Surgery: 12/15/2021    Procedure(s):  COLONOSCOPY  Indication: Ulcerative colitis, universal, un COLONOSCOPY N/A 11/7/2019    Procedure: COLONOSCOPY;  Surgeon: Soha Stephens MD;  Location: [address] [place] [address] ENDOSCOPY   • EXCISIONAL BIOSPY RIGHT  1967   • HYSTERECTOMY  11/06/2017    TVH/BSO/vag vault suspension / post repair   • EVERETT LOCALIZATION WIRE 1 SITE RIGHT (C History      Marital status:       Spouse name: Not on file      Number of children: Not on file      Years of education: Not on file      Highest education level: Not on file    Occupational History      Not on file    Tobacco Use      Smoking stat and her pulse is 56. Her respiration is 16 and oxygen saturation is 97%.     12/15/21  0659   BP: 104/60   Pulse: 56   Resp: 16   SpO2: 97%   Weight: 72.6 kg (160 lb)   Height: 1.727 m (5' 8\")        Anesthesia Evaluation     Patient summary reviewed and N

## 2021-12-15 NOTE — OPERATIVE REPORT
COLONOSCOPY REPORT    Tamar Shaffer     1950 Age 80year old   PCP Meghan Jack MD Endoscopist Kirit Colvin MD     Date of procedure: 19    Procedure: Colonoscopy w/biopsies    Pre-operative diagnosis: surveillance for CRC in UC patient vascularity of the left colon. No active inflammation or friability noted    5. JIGAR: normal rectal tone, no masses palpated. Impression:   · Pseudopolyps and decreased vascularity  · No active colitis appreciated    Recommend:  · Await pathology.  The i

## 2021-12-20 ENCOUNTER — HOSPITAL ENCOUNTER (OUTPATIENT)
Dept: MAMMOGRAPHY | Facility: HOSPITAL | Age: 71
Discharge: HOME | End: 2021-12-20
Attending: INTERNAL MEDICINE
Payer: MEDICARE

## 2021-12-20 DIAGNOSIS — Z12.31 SCREENING MAMMOGRAM FOR BREAST CANCER: ICD-10-CM

## 2021-12-20 PROCEDURE — 77067 SCR MAMMO BI INCL CAD: CPT | Performed by: INTERNAL MEDICINE

## 2021-12-20 PROCEDURE — 77063 BREAST TOMOSYNTHESIS BI: CPT | Performed by: INTERNAL MEDICINE

## 2021-12-29 ENCOUNTER — OFFICE VISIT (OUTPATIENT)
Dept: HEMATOLOGY/ONCOLOGY | Facility: HOSPITAL | Age: 71
End: 2021-12-29
Attending: INTERNAL MEDICINE
Payer: MEDICARE

## 2021-12-29 VITALS
RESPIRATION RATE: 16 BRPM | WEIGHT: 164 LBS | TEMPERATURE: 99 F | BODY MASS INDEX: 25 KG/M2 | HEART RATE: 64 BPM | SYSTOLIC BLOOD PRESSURE: 106 MMHG | DIASTOLIC BLOOD PRESSURE: 54 MMHG | OXYGEN SATURATION: 94 %

## 2021-12-29 DIAGNOSIS — K51.011 ULCERATIVE PANCOLITIS WITH RECTAL BLEEDING (HCC): Primary | ICD-10-CM

## 2021-12-29 PROCEDURE — 96415 CHEMO IV INFUSION ADDL HR: CPT

## 2021-12-29 PROCEDURE — 96413 CHEMO IV INFUSION 1 HR: CPT

## 2021-12-29 PROCEDURE — 96375 TX/PRO/DX INJ NEW DRUG ADDON: CPT

## 2022-01-03 ENCOUNTER — TELEPHONE (OUTPATIENT)
Dept: INTERNAL MEDICINE CLINIC | Facility: CLINIC | Age: 72
End: 2022-01-03

## 2022-01-03 NOTE — TELEPHONE ENCOUNTER
Spoke with patient and asked her to provide us with a fax number. Patient stated she will call back with fax number.

## 2022-01-03 NOTE — TELEPHONE ENCOUNTER
Patient asking to complete labs at [facility] , please send order to [email or web address]. Also asking to e-mail order to her at [email or web address]    Please call patient once the order have been sent.      Patient would like to complete labs jose antonio

## 2022-01-04 NOTE — TELEPHONE ENCOUNTER
Spoke with patient and informed that lab orders were faxed to both numbers and received confirmation

## 2022-01-04 NOTE — TELEPHONE ENCOUNTER
Per patient they did not received the fax that was sent and so, patient would like to refax the orders on file fax it to [address] or [phone]. Patient is planning to go and had her blood test tomorrow.  Please call patient when it was alread

## 2022-01-05 LAB
AMB EXT CHOLESTEROL, TOTAL: 247 MG/DL
AMB EXT HDL CHOLESTEROL: 80 MG/DL
AMB EXT LDL CHOLESTEROL, DIRECT: 156 MG/DL
AMB EXT TRIGLYCERIDES: 57 MG/DL

## 2022-01-12 ENCOUNTER — MED REC SCAN ONLY (OUTPATIENT)
Dept: INTERNAL MEDICINE CLINIC | Facility: CLINIC | Age: 72
End: 2022-01-12

## 2022-01-13 ENCOUNTER — TELEPHONE (OUTPATIENT)
Dept: INTERNAL MEDICINE CLINIC | Facility: CLINIC | Age: 72
End: 2022-01-13

## 2022-01-13 NOTE — TELEPHONE ENCOUNTER
Patient woke up with congestion this morning and felt it is not safe for today's in-office appt and possibly expose medical office staff during covid pandemic. Patient will reschedule her physical appt for a later time.

## 2022-02-23 ENCOUNTER — APPOINTMENT (OUTPATIENT)
Dept: HEMATOLOGY/ONCOLOGY | Facility: HOSPITAL | Age: 72
End: 2022-02-23
Attending: INTERNAL MEDICINE
Payer: MEDICARE

## 2022-02-28 ENCOUNTER — OFFICE VISIT (OUTPATIENT)
Dept: HEMATOLOGY/ONCOLOGY | Facility: HOSPITAL | Age: 72
End: 2022-02-28
Attending: INTERNAL MEDICINE
Payer: MEDICARE

## 2022-02-28 VITALS
RESPIRATION RATE: 16 BRPM | DIASTOLIC BLOOD PRESSURE: 70 MMHG | BODY MASS INDEX: 25 KG/M2 | SYSTOLIC BLOOD PRESSURE: 112 MMHG | WEIGHT: 163.63 LBS | HEART RATE: 79 BPM | TEMPERATURE: 99 F

## 2022-02-28 DIAGNOSIS — K51.011 ULCERATIVE PANCOLITIS WITH RECTAL BLEEDING (HCC): Primary | ICD-10-CM

## 2022-02-28 PROCEDURE — 96375 TX/PRO/DX INJ NEW DRUG ADDON: CPT

## 2022-02-28 PROCEDURE — 96415 CHEMO IV INFUSION ADDL HR: CPT

## 2022-02-28 PROCEDURE — 96413 CHEMO IV INFUSION 1 HR: CPT

## 2022-04-18 ENCOUNTER — OFFICE VISIT (OUTPATIENT)
Dept: INTERNAL MEDICINE CLINIC | Facility: CLINIC | Age: 72
End: 2022-04-18
Payer: MEDICARE

## 2022-04-18 VITALS
HEIGHT: 68 IN | BODY MASS INDEX: 25.05 KG/M2 | WEIGHT: 165.31 LBS | SYSTOLIC BLOOD PRESSURE: 114 MMHG | RESPIRATION RATE: 16 BRPM | DIASTOLIC BLOOD PRESSURE: 62 MMHG | HEART RATE: 69 BPM

## 2022-04-18 DIAGNOSIS — E53.8 VITAMIN B12 DEFICIENCY: ICD-10-CM

## 2022-04-18 DIAGNOSIS — K51.00 ULCERATIVE PANCOLITIS WITHOUT COMPLICATION (HCC): ICD-10-CM

## 2022-04-18 DIAGNOSIS — D84.9 IMMUNOSUPPRESSED STATUS (HCC): ICD-10-CM

## 2022-04-18 DIAGNOSIS — E78.00 HYPERCHOLESTEROLEMIA: ICD-10-CM

## 2022-04-18 DIAGNOSIS — G62.9 NEUROPATHY: ICD-10-CM

## 2022-04-18 DIAGNOSIS — L30.9 DERMATITIS: ICD-10-CM

## 2022-04-18 DIAGNOSIS — Z00.00 INITIAL MEDICARE ANNUAL WELLNESS VISIT: Primary | ICD-10-CM

## 2022-04-18 DIAGNOSIS — E55.9 VITAMIN D DEFICIENCY: ICD-10-CM

## 2022-04-18 DIAGNOSIS — Z01.419 ENCOUNTER FOR GYNECOLOGICAL EXAMINATION WITHOUT ABNORMAL FINDING: ICD-10-CM

## 2022-04-18 DIAGNOSIS — M81.0 AGE-RELATED OSTEOPOROSIS WITHOUT CURRENT PATHOLOGICAL FRACTURE: ICD-10-CM

## 2022-04-18 PROBLEM — Z23 NEED FOR VACCINATION: Status: RESOLVED | Noted: 2020-07-08 | Resolved: 2022-04-18

## 2022-04-18 PROBLEM — M17.10 ARTHRITIS OF KNEE: Status: RESOLVED | Noted: 2020-07-08 | Resolved: 2022-04-18

## 2022-04-18 PROCEDURE — 96372 THER/PROPH/DIAG INJ SC/IM: CPT | Performed by: INTERNAL MEDICINE

## 2022-04-18 PROCEDURE — 99214 OFFICE O/P EST MOD 30 MIN: CPT | Performed by: INTERNAL MEDICINE

## 2022-04-18 PROCEDURE — G0101 CA SCREEN;PELVIC/BREAST EXAM: HCPCS | Performed by: INTERNAL MEDICINE

## 2022-04-18 PROCEDURE — G0438 PPPS, INITIAL VISIT: HCPCS | Performed by: INTERNAL MEDICINE

## 2022-04-18 RX ORDER — MELATONIN
1000 DAILY
Qty: 30 TABLET | Refills: 11 | OUTPATIENT
Start: 2022-04-18

## 2022-04-18 RX ORDER — CYANOCOBALAMIN 1000 UG/ML
1000 INJECTION INTRAMUSCULAR; SUBCUTANEOUS ONCE
Status: COMPLETED | OUTPATIENT
Start: 2022-04-18 | End: 2022-04-18

## 2022-04-18 RX ORDER — TRIAMCINOLONE ACETONIDE 5 MG/G
1 CREAM TOPICAL 2 TIMES DAILY
Qty: 30 G | Refills: 1 | Status: SHIPPED | OUTPATIENT
Start: 2022-04-18

## 2022-04-18 RX ADMIN — CYANOCOBALAMIN 1000 MCG: 1000 INJECTION INTRAMUSCULAR; SUBCUTANEOUS at 14:12:00

## 2022-04-18 NOTE — ASSESSMENT & PLAN NOTE
Vitamin B12 injection given today. Advised patient to take oral vitamin B12. Recheck level in 3 months.

## 2022-04-18 NOTE — ASSESSMENT & PLAN NOTE
Patient's cholesterol is high. Patient will do a CT heart for risk stratification. Patient understands that she has to pay for this test out-of-pocket.

## 2022-04-18 NOTE — ASSESSMENT & PLAN NOTE
Patient's last T score in her hip was -2.0 in July 2020. She does not want medication for osteoporosis at this time, however she will start taking calcium and vitamin D. She exercises daily. Next bone density in July.

## 2022-04-18 NOTE — ASSESSMENT & PLAN NOTE
Unremarkable exam.  Pt's colon cancer screening is up to date. Immunizations were reviewed. I recommend that she get the COVID vaccine. Patient is thinking about it, because she plans to travel.

## 2022-04-25 ENCOUNTER — OFFICE VISIT (OUTPATIENT)
Dept: HEMATOLOGY/ONCOLOGY | Facility: HOSPITAL | Age: 72
End: 2022-04-25
Attending: INTERNAL MEDICINE
Payer: MEDICARE

## 2022-04-25 ENCOUNTER — PATIENT MESSAGE (OUTPATIENT)
Dept: GASTROENTEROLOGY | Facility: CLINIC | Age: 72
End: 2022-04-25

## 2022-04-25 VITALS
SYSTOLIC BLOOD PRESSURE: 119 MMHG | HEART RATE: 71 BPM | WEIGHT: 166.38 LBS | DIASTOLIC BLOOD PRESSURE: 65 MMHG | BODY MASS INDEX: 25 KG/M2 | TEMPERATURE: 98 F | RESPIRATION RATE: 16 BRPM

## 2022-04-25 DIAGNOSIS — K51.011 ULCERATIVE PANCOLITIS WITH RECTAL BLEEDING (HCC): Primary | ICD-10-CM

## 2022-04-25 PROCEDURE — 96375 TX/PRO/DX INJ NEW DRUG ADDON: CPT

## 2022-04-25 PROCEDURE — 96415 CHEMO IV INFUSION ADDL HR: CPT

## 2022-04-25 PROCEDURE — 96413 CHEMO IV INFUSION 1 HR: CPT

## 2022-04-25 NOTE — PROGRESS NOTES
Patient arrives for Remicade for ulcerative colitis. Reports she is well. Denies any recent fevers. Premedication given. PIV started in right forearm, positive blood return noted. Remicade given over two hours, patient tolerated well. PIV removed, site covered with 2x2 and coban. Patient discharged ambulating independently. Patient is aware that she needs a new order prior to next apt.

## 2022-05-19 NOTE — TELEPHONE ENCOUNTER
Will renew but patient can decide if she does not want to take it and then just follow with me in 3 months to make sure she is doing well

## 2022-05-20 ENCOUNTER — TELEPHONE (OUTPATIENT)
Dept: GASTROENTEROLOGY | Facility: CLINIC | Age: 72
End: 2022-05-20

## 2022-05-20 NOTE — TELEPHONE ENCOUNTER
Pended new orders to MD in telephone encounter dated 05/20/2022. Once signed will contact Delvin Ulrich to update on plan and facilitate f/u visit with Dr. Gerardo Small x 3 months.

## 2022-05-20 NOTE — TELEPHONE ENCOUNTER
Dr. Jody Pinzon--    Pended new Remicade (inflliximab) orders under therapy cancer ctr-GI tab. Please review / sign if appropriate. Renewal per request.     Thank you!

## 2022-05-21 ENCOUNTER — HOSPITAL ENCOUNTER (OUTPATIENT)
Dept: CT IMAGING | Age: 72
Discharge: HOME | End: 2022-05-21
Attending: INTERNAL MEDICINE
Payer: MEDICARE

## 2022-05-21 DIAGNOSIS — R91.8 LUNG NODULES: ICD-10-CM

## 2022-05-21 PROCEDURE — 71250 CT THORAX DX C-: CPT | Performed by: INTERNAL MEDICINE

## 2022-06-02 NOTE — TELEPHONE ENCOUNTER
Patient is calling because she tried 10 minutes ago scheduling her infusion but was told no orders are in the system and was not scheduled. Please call.

## 2022-06-02 NOTE — TELEPHONE ENCOUNTER
Nadine Pinto--    Please assist with scheduling Lopez Fothergill for next infusion. New orders placed / authorized. Of note, per patient choice to proceed with scheduling. Aware of risks with stopping therapy.      Thank you

## 2022-06-03 NOTE — TELEPHONE ENCOUNTER
Dr. Keshawn Hays--    Solu-cortef pended under therapy plan. [facility] infusion center requesting due to given prior to each Remicade infusion. Please review / sign if appropriate. Thank you!

## 2022-06-08 NOTE — TELEPHONE ENCOUNTER
To: [facility]FF      From: Marcelo Hsu      Created: 6/8/2022 4:06 PM        *-*-*This message has not been handled. *-*-*    Hi, I have tried twice to schedule my remicade infusion & have been unsuccessful because they cannot locate the order.  Pls advise

## 2022-06-08 NOTE — TELEPHONE ENCOUNTER
[facility]---    Please assist with scheduling Remicade infusion. PA has been approved and patient is awaiting call after multiple attempts. Thank you!

## 2022-06-14 NOTE — TELEPHONE ENCOUNTER
Future Appointments   Date Time Provider Nadine Serrano   6/21/2022 10:30 AM EM CC INFRN 6 [facility] CHEMO EMO

## 2022-06-21 ENCOUNTER — OFFICE VISIT (OUTPATIENT)
Dept: HEMATOLOGY/ONCOLOGY | Facility: HOSPITAL | Age: 72
End: 2022-06-21
Attending: INTERNAL MEDICINE
Payer: MEDICARE

## 2022-06-21 VITALS
HEART RATE: 65 BPM | OXYGEN SATURATION: 96 % | BODY MASS INDEX: 25 KG/M2 | DIASTOLIC BLOOD PRESSURE: 57 MMHG | TEMPERATURE: 99 F | RESPIRATION RATE: 18 BRPM | SYSTOLIC BLOOD PRESSURE: 109 MMHG | WEIGHT: 162 LBS

## 2022-06-21 DIAGNOSIS — K51.00 ULCERATIVE PANCOLITIS WITHOUT COMPLICATION (HCC): Primary | ICD-10-CM

## 2022-06-21 PROCEDURE — 96375 TX/PRO/DX INJ NEW DRUG ADDON: CPT

## 2022-06-21 PROCEDURE — 96415 CHEMO IV INFUSION ADDL HR: CPT

## 2022-06-21 PROCEDURE — 96413 CHEMO IV INFUSION 1 HR: CPT

## 2022-06-21 NOTE — PROGRESS NOTES
Patient arrives for Remicade for ulcerative colitis. Reports she is well. Denies any recent fevers. Premedication given. PIV started in RFA, positive blood return noted. Remicade given over two hours, patient tolerated well. PIV removed, site covered with 2x2 and coban. Patient discharged ambulating independently. [place] be going out [place] so next appointment made late on 9/30-pt will call MD office to see if she can get infusion earlier than 8 weeks or if they want it late with vacation scheduled.

## 2022-07-12 NOTE — TELEPHONE ENCOUNTER
PHYSICAL THERAPY NOTE          Patient Name: Keyona SOLIS Date: 7/12/2022 07/12/22 1042   Pain Assessment   Pain Assessment Tool 0-10   Pain Score 7   Pain Location/Orientation Orientation: Left; Location: Leg   Pain Onset/Description Onset: Ongoing;Frequency: Constant/Continuous; Descriptor: Aching   Effect of Pain on Daily Activities increased pain with activity   Patient's Stated Pain Goal No pain   Hospital Pain Intervention(s) Ambulation/increased activity;Repositioned   Restrictions/Precautions   Weight Bearing Precautions Per Order Yes   LLE Weight Bearing Per Order (S)  WBAT  (in CAM boot)   Braces or Orthoses CAM Boot  (LLE)   Other Precautions WBS; Fall Risk;Pain;Multiple lines   General   Chart Reviewed Yes   Response to Previous Treatment Patient with no complaints from previous session  Family/Caregiver Present No   Cognition   Overall Cognitive Status WFL   Subjective   Subjective Patient willing and motivated to participate with PT   Bed Mobility   Supine to Sit 5  Supervision   Additional items Increased time required   Transfers   Sit to Stand 5  Supervision   Additional items Increased time required   Stand to Sit 5  Supervision   Additional items Increased time required   Additional Comments slight cues required for hand placement during transfers   Ambulation/Elevation   Gait pattern Excessively slow; Short stride; Foward flexed   Gait Assistance 5  Supervision   Assistive Device Rolling walker   Distance 50ft   Stair Management Assistance 4  Minimal assist   Additional items Assist x 1   Stair Management Technique One rail L;With crutches; With walker   Number of Stairs 5  (3 steps with axillary crutch on R and hand rail on L; 1 curb stepx 2 with use of RW)   Balance   Static Sitting Fair +   Static Standing Fair -   Ambulatory Fair -   Endurance Deficit   Endurance Deficit Yes   Endurance Deficit Pt's Remicade has been approved.  She has her next infusion scheduled    Future Appointments  Date Time Provider Nadine Serrano   7/25/2018 4:00 PM EM CC INFRN 2 [facility] CHEMO EMO Description fatigue, pain   Activity Tolerance   Activity Tolerance Patient limited by fatigue;Patient limited by pain   Nurse Made Aware Patient appropriate to be seen and mobilized per nursing   Exercises   Heelslides Sitting;15 reps;AAROM; Left  (x 2 sets)   Knee AROM Long Arc Quad Sitting;15 reps;AAROM; Left  (x 2 sets)   Marching Sitting;15 reps;AAROM; Left  (x 2 sets)   Assessment   Prognosis Good   Problem List Decreased strength;Decreased range of motion;Decreased endurance;Decreased mobility;Pain;Orthopedic restrictions   Assessment Patient resting in bed at time of PT treatment session  Patient continues to report left LE pain, but is willing and motivated to participate with PT  Patient was able to perform all bed mobility and transfers with supervision which is slightly improved compared to previous session however slight cuing still required for proper hand placement during sit to stand transfers  Patient was able to tolerate increased ambulation distances with supervision and the use of a rolling walker which is also improved compared to previous session however distances remain limited by fatigue and pain  Patient utilize step to gait pattern and was noted to have forward flexed posture and decreased stance time on left  No gross loss of balance noted with gait training  Stair negotiation was attempted this session and patient was able to ascend and descend 3 steps utilizing axillary crutch on right and handrail on left  No gross loss of balance noted with stair training, but slight cuing was required for proper lower extremity sequencing on stairs  Patient reported having 1+1 steps to enter home with landing so curb step was attempted  Patient was able to perform curb step utilizing rolling walker with Min a x1  Additionally, patient was able to tolerate and perform all lower extremity TherEx seated out of bed in chair without any increase in complaints    PT discussed discharge needs with patient and patient reports he has friends and a wife who is in the process of getting a divorce from who could provide assistance if needed  Patient was provided with axillary crutches during PT treatment session  At conclusion of PT treatment session patient was assisted back into chair with all needs within reach  PT will continue to follow, D/C recommendation when medically cleared is home with support, home PT  Barriers to Discharge None   Barriers to Discharge Comments Patient denies any mobility or safety concerns about returning home at time of discharge   Goals   Patient Goals " to go home"   [facility] Expiration Date 07/21/22   PT Treatment Day 1   Plan   Treatment/Interventions Functional transfer training;LE strengthening/ROM; Elevations; Therapeutic exercise; Endurance training;Patient/family training;Equipment eval/education; Bed mobility;Gait training;Spoke to nursing;OT   Progress Progressing toward goals   PT Frequency Other (Comment)  (3-6x a week)   Recommendation   PT Discharge Recommendation Home with home health rehabilitation   Equipment Recommended Walker;Crutches   Ki 74 walker   Additional Comments Patient denies any mobility or safety concerns about returning home at time of discharge   Javid 8 in Bed Without Bedrails 4   Lying on Back to Sitting on Edge of Flat Bed 4   Moving Bed to Chair 4   Standing Up From Chair 3   Walk in Room 3   Climb 3-5 Stairs 3   Basic Mobility Inpatient Raw Score 21   Basic Mobility Standardized Score 45 55   Highest Level Of Mobility   JH-HLM Goal 6: Walk 10 steps or more   JH-HLM Achieved 7: Walk 25 feet or more   Portions of the documentation may have been created using voice recognition software  Occasional wrong word or sound alike substitutions may have occurred due to the inherent limitations of the voice recognition software   Read the chart carefully and recognize, using context, where substitutions have occurred      Augila Dickinson, PT, DPT

## 2022-08-01 ENCOUNTER — HOSPITAL ENCOUNTER (OUTPATIENT)
Dept: BONE DENSITY | Age: 72
Discharge: HOME | End: 2022-08-01
Attending: INTERNAL MEDICINE
Payer: MEDICARE

## 2022-08-01 DIAGNOSIS — M81.0 AGE-RELATED OSTEOPOROSIS WITHOUT CURRENT PATHOLOGICAL FRACTURE: ICD-10-CM

## 2022-08-01 PROCEDURE — 77080 DXA BONE DENSITY AXIAL: CPT | Performed by: INTERNAL MEDICINE

## 2022-08-30 ENCOUNTER — APPOINTMENT (OUTPATIENT)
Dept: HEMATOLOGY/ONCOLOGY | Facility: HOSPITAL | Age: 72
End: 2022-08-30
Attending: INTERNAL MEDICINE
Payer: MEDICARE

## 2022-09-30 ENCOUNTER — OFFICE VISIT (OUTPATIENT)
Dept: HEMATOLOGY/ONCOLOGY | Facility: HOSPITAL | Age: 72
End: 2022-09-30
Attending: INTERNAL MEDICINE
Payer: MEDICARE

## 2022-09-30 VITALS
BODY MASS INDEX: 24 KG/M2 | TEMPERATURE: 98 F | DIASTOLIC BLOOD PRESSURE: 50 MMHG | SYSTOLIC BLOOD PRESSURE: 122 MMHG | RESPIRATION RATE: 18 BRPM | OXYGEN SATURATION: 95 % | HEART RATE: 74 BPM | WEIGHT: 158.19 LBS

## 2022-09-30 DIAGNOSIS — K51.00 ULCERATIVE PANCOLITIS WITHOUT COMPLICATION (HCC): Primary | ICD-10-CM

## 2022-09-30 PROCEDURE — 96413 CHEMO IV INFUSION 1 HR: CPT

## 2022-09-30 PROCEDURE — 96375 TX/PRO/DX INJ NEW DRUG ADDON: CPT

## 2022-09-30 PROCEDURE — 96415 CHEMO IV INFUSION ADDL HR: CPT

## 2022-09-30 NOTE — PROGRESS NOTES
Presents for remicade ordered every 8 weeks for UC. Per Nanci Lujan, has not had flare up of, does have generalized soreness for arthritis today. PIV established with + blood return, ivp slow solucortef. Remicade infused with no s/s of adverse reaction. PIV removed, site covered with [id number] coban. No bleeding noted. Discharged stable with future appointments.

## 2022-10-03 ENCOUNTER — OFFICE VISIT (OUTPATIENT)
Facility: CLINIC | Age: 72
End: 2022-10-03
Payer: MEDICARE

## 2022-10-03 VITALS
RESPIRATION RATE: 18 BRPM | BODY MASS INDEX: 23.95 KG/M2 | SYSTOLIC BLOOD PRESSURE: 116 MMHG | WEIGHT: 158 LBS | HEART RATE: 73 BPM | OXYGEN SATURATION: 96 % | HEIGHT: 68 IN | DIASTOLIC BLOOD PRESSURE: 64 MMHG

## 2022-10-03 DIAGNOSIS — D84.9 IMMUNOSUPPRESSED STATUS (HCC): ICD-10-CM

## 2022-10-03 DIAGNOSIS — Z12.31 BREAST CANCER SCREENING BY MAMMOGRAM: ICD-10-CM

## 2022-10-03 DIAGNOSIS — E53.8 VITAMIN B12 DEFICIENCY: ICD-10-CM

## 2022-10-03 DIAGNOSIS — R91.8 LUNG NODULES: Primary | ICD-10-CM

## 2022-10-03 DIAGNOSIS — K51.00 ULCERATIVE PANCOLITIS WITHOUT COMPLICATION (HCC): ICD-10-CM

## 2022-10-03 PROBLEM — K51.019: Status: RESOLVED | Noted: 2017-06-13 | Resolved: 2022-10-03

## 2022-10-03 PROCEDURE — 99213 OFFICE O/P EST LOW 20 MIN: CPT | Performed by: INTERNAL MEDICINE

## 2022-10-03 NOTE — PATIENT INSTRUCTIONS
Do non-fasting blood tests soon. You can walk-in or schedule an appointment. Do not take vitamins or supplements for 3 days prior to the blood test.    Take Aleve (naproxen), 1 tab twice daily, unless it bothers your stomach.

## 2022-10-19 NOTE — TELEPHONE ENCOUNTER
Forwarded to Dr Bernardo Carmen office on call in Dr Baltazar Double abscence. I see only partial question answered.  Pt also wanted to know if she should take another broad spectrum antibiotic next Tuesday at dental appt if her dentis gives her one, and should she continue V Pt p/w c/o slip and fall down 5 steps sliding down on her back, no LOC.  c/o leg pain, headache.  Pt also has been dealing with URI s/s congestion cough.

## 2022-11-25 ENCOUNTER — APPOINTMENT (OUTPATIENT)
Dept: HEMATOLOGY/ONCOLOGY | Facility: HOSPITAL | Age: 72
End: 2022-11-25
Attending: INTERNAL MEDICINE
Payer: MEDICARE

## 2023-02-20 ENCOUNTER — PATIENT MESSAGE (OUTPATIENT)
Dept: GASTROENTEROLOGY | Facility: CLINIC | Age: 73
End: 2023-02-20

## 2023-02-20 NOTE — TELEPHONE ENCOUNTER
From: Abbey Benitez  To: Kinsey Marquez MD  Sent: 2/20/2023 10:26 AM CST  Subject: infusion appt    Helkolby Pinzon, my last remicade infusion occurred on 9/30/22. [place] have been out [place] since [place] & did not have any infusions since then. [place] to [place] on [place] & checking to see if I need to see you before possibly beginning infusions again. I'm hoping that I'm in remission for my UC & pyoderma but I did have other unusual symptoms that began approx 1/18/23 with random body aches/pains & joint swelling that were sometimes severe enough to impede daily activities; I am just recovering from them & beginning to feel \"normal\". Please advise if you'd like for me to resume with infusions or if an appt to see you first is preferred.  Flor Medrano

## 2023-02-23 ENCOUNTER — HOSPITAL ENCOUNTER (OUTPATIENT)
Dept: MAMMOGRAPHY | Age: 73
Discharge: HOME | End: 2023-02-23
Attending: INTERNAL MEDICINE
Payer: MEDICARE

## 2023-02-23 DIAGNOSIS — Z12.31 BREAST CANCER SCREENING BY MAMMOGRAM: ICD-10-CM

## 2023-02-23 PROCEDURE — 77067 SCR MAMMO BI INCL CAD: CPT | Performed by: INTERNAL MEDICINE

## 2023-02-23 PROCEDURE — 77063 BREAST TOMOSYNTHESIS BI: CPT | Performed by: INTERNAL MEDICINE

## 2023-03-01 ENCOUNTER — TELEPHONE (OUTPATIENT)
Facility: CLINIC | Age: 73
End: 2023-03-01

## 2023-03-01 DIAGNOSIS — K52.9 IBD (INFLAMMATORY BOWEL DISEASE): Primary | ICD-10-CM

## 2023-03-01 NOTE — TELEPHONE ENCOUNTER
Pt is following up on pt message from 2/20 Hi Dr Alverto Davis, thanks for your response. I immediately scheduled my infusion & will have it this Friday, 3/3. I will also be getting some labs as ordered by my Primary, Dr Angel Guidry to check on vitamin deficiencies & more.   Pt is wondering if she needs any additional lab work please follow up

## 2023-03-01 NOTE — TELEPHONE ENCOUNTER
[facility] Clinical Staff (supporting Devon Casillas MD) 2 minutes ago (11:22 AM)       Hi Dr Bk Slaughter, thanks for your response. I immediately scheduled my infusion & will have it this Friday, 3/3. I will also be getting some labs as ordered by my Primary, Dr Too Deng to check on vitamin deficiencies & more. I just want to let you know that my body aches/pains have continued & last nite was pretty severe in my shoulders, wrists & knees, rendering me nearly immobile & movement without pain was impossible. Typically, I wake up much better but very stiff & a knee that feels like it wants to snap. I refrain from pain meds until late afternoons when it starts to get very bad & have been taking between 400mg to 600mg ibuprofen since the naproxen seemed ineffective. I had thought \"normalcy\" was around the corner but just not getting there. So I wanted to check with you once again to make sure that the infusion will hopefully help me & not exacerbate this condition. Again, no sign of infection/fever. Also, the soonest follow-up appt with you that I was able to make online is now scheduled for July 5th & wondering if your office can put me on a wait list for something [facility] sooner. I will be out of town between May 15 thru end of June. Thank you.

## 2023-03-02 NOTE — TELEPHONE ENCOUNTER
Called patient,  verified. Informed pt of blood work ordered by Dr. Aarti Orellana and instructed to have it done. She will have blood work tomorrow am before her infusion. She said she saw the message via [employer]. Appt made with pt on  at 1pm with Dr. Aarti Orellana, date,time,location confirmed and pt accepted appt.  appt cancelled. Pt is in agreement, verbalized understanding. No further action required. Telephone encounter closed.

## 2023-03-03 ENCOUNTER — OFFICE VISIT (OUTPATIENT)
Dept: HEMATOLOGY/ONCOLOGY | Facility: HOSPITAL | Age: 73
End: 2023-03-03
Attending: INTERNAL MEDICINE
Payer: MEDICARE

## 2023-03-03 ENCOUNTER — LAB ENCOUNTER (OUTPATIENT)
Dept: LAB | Facility: HOSPITAL | Age: 73
End: 2023-03-03
Attending: INTERNAL MEDICINE
Payer: MEDICARE

## 2023-03-03 VITALS
BODY MASS INDEX: 24 KG/M2 | WEIGHT: 160 LBS | OXYGEN SATURATION: 98 % | TEMPERATURE: 98 F | HEART RATE: 74 BPM | RESPIRATION RATE: 18 BRPM | SYSTOLIC BLOOD PRESSURE: 119 MMHG | DIASTOLIC BLOOD PRESSURE: 54 MMHG

## 2023-03-03 DIAGNOSIS — E53.8 VITAMIN B12 DEFICIENCY: ICD-10-CM

## 2023-03-03 DIAGNOSIS — E55.9 VITAMIN D DEFICIENCY: ICD-10-CM

## 2023-03-03 DIAGNOSIS — K52.9 IBD (INFLAMMATORY BOWEL DISEASE): ICD-10-CM

## 2023-03-03 DIAGNOSIS — R73.09 ELEVATED GLUCOSE: ICD-10-CM

## 2023-03-03 DIAGNOSIS — E78.00 HYPERCHOLESTEROLEMIA: ICD-10-CM

## 2023-03-03 DIAGNOSIS — M81.0 AGE-RELATED OSTEOPOROSIS WITHOUT CURRENT PATHOLOGICAL FRACTURE: ICD-10-CM

## 2023-03-03 DIAGNOSIS — K51.00 ULCERATIVE PANCOLITIS WITHOUT COMPLICATION (HCC): Primary | ICD-10-CM

## 2023-03-03 LAB
ALBUMIN SERPL-MCNC: 3.5 G/DL (ref 3.4–5)
ALBUMIN/GLOB SERPL: 0.8 {RATIO} (ref 1–2)
ALP LIVER SERPL-CCNC: 84 U/L
ALT SERPL-CCNC: 22 U/L
ANION GAP SERPL CALC-SCNC: 5 MMOL/L (ref 0–18)
AST SERPL-CCNC: 19 U/L (ref 15–37)
BASOPHILS # BLD AUTO: 0.04 X10(3) UL (ref 0–0.2)
BASOPHILS NFR BLD AUTO: 0.7 %
BILIRUB SERPL-MCNC: 0.4 MG/DL (ref 0.1–2)
BUN BLD-MCNC: 23 MG/DL (ref 7–18)
BUN/CREAT SERPL: 35.4 (ref 10–20)
CALCIUM BLD-MCNC: 9.1 MG/DL (ref 8.5–10.1)
CHLORIDE SERPL-SCNC: 108 MMOL/L (ref 98–112)
CHOLEST SERPL-MCNC: 163 MG/DL (ref ?–200)
CO2 SERPL-SCNC: 28 MMOL/L (ref 21–32)
CREAT BLD-MCNC: 0.65 MG/DL
CRP SERPL-MCNC: <0.29 MG/DL (ref ?–0.3)
DEPRECATED RDW RBC AUTO: 42.7 FL (ref 35.1–46.3)
EOSINOPHIL # BLD AUTO: 0.17 X10(3) UL (ref 0–0.7)
EOSINOPHIL NFR BLD AUTO: 2.8 %
ERYTHROCYTE [DISTWIDTH] IN BLOOD BY AUTOMATED COUNT: 12.7 % (ref 11–15)
ERYTHROCYTE [SEDIMENTATION RATE] IN BLOOD: 73 MM/HR
EST. AVERAGE GLUCOSE BLD GHB EST-MCNC: 126 MG/DL (ref 68–126)
FASTING PATIENT LIPID ANSWER: YES
FASTING STATUS PATIENT QL REPORTED: YES
FOLATE SERPL-MCNC: 18.8 NG/ML (ref 8.7–?)
GFR SERPLBLD BASED ON 1.73 SQ M-ARVRAT: 94 ML/MIN/1.73M2 (ref 60–?)
GLOBULIN PLAS-MCNC: 4.5 G/DL (ref 2.8–4.4)
GLUCOSE BLD-MCNC: 105 MG/DL (ref 70–99)
HBA1C MFR BLD: 6 % (ref ?–5.7)
HCT VFR BLD AUTO: 36.8 %
HDLC SERPL-MCNC: 78 MG/DL (ref 40–59)
HGB BLD-MCNC: 12.4 G/DL
IMM GRANULOCYTES # BLD AUTO: 0.02 X10(3) UL (ref 0–1)
IMM GRANULOCYTES NFR BLD: 0.3 %
LDLC SERPL CALC-MCNC: 75 MG/DL (ref ?–100)
LYMPHOCYTES # BLD AUTO: 1.32 X10(3) UL (ref 1–4)
LYMPHOCYTES NFR BLD AUTO: 22 %
MCH RBC QN AUTO: 30.8 PG (ref 26–34)
MCHC RBC AUTO-ENTMCNC: 33.7 G/DL (ref 31–37)
MCV RBC AUTO: 91.5 FL
MONOCYTES # BLD AUTO: 0.45 X10(3) UL (ref 0.1–1)
MONOCYTES NFR BLD AUTO: 7.5 %
NEUTROPHILS # BLD AUTO: 4 X10 (3) UL (ref 1.5–7.7)
NEUTROPHILS # BLD AUTO: 4 X10(3) UL (ref 1.5–7.7)
NEUTROPHILS NFR BLD AUTO: 66.7 %
NONHDLC SERPL-MCNC: 85 MG/DL (ref ?–130)
OSMOLALITY SERPL CALC.SUM OF ELEC: 296 MOSM/KG (ref 275–295)
PLATELET # BLD AUTO: 342 10(3)UL (ref 150–450)
POTASSIUM SERPL-SCNC: 4 MMOL/L (ref 3.5–5.1)
PROT SERPL-MCNC: 8 G/DL (ref 6.4–8.2)
RBC # BLD AUTO: 4.02 X10(6)UL
SODIUM SERPL-SCNC: 141 MMOL/L (ref 136–145)
TRIGL SERPL-MCNC: 47 MG/DL (ref 30–149)
VIT B12 SERPL-MCNC: 488 PG/ML (ref 193–986)
VIT D+METAB SERPL-MCNC: 32.1 NG/ML (ref 30–100)
VLDLC SERPL CALC-MCNC: 7 MG/DL (ref 0–30)
WBC # BLD AUTO: 6 X10(3) UL (ref 4–11)

## 2023-03-03 PROCEDURE — 82746 ASSAY OF FOLIC ACID SERUM: CPT

## 2023-03-03 PROCEDURE — 83036 HEMOGLOBIN GLYCOSYLATED A1C: CPT

## 2023-03-03 PROCEDURE — 86140 C-REACTIVE PROTEIN: CPT

## 2023-03-03 PROCEDURE — 96375 TX/PRO/DX INJ NEW DRUG ADDON: CPT

## 2023-03-03 PROCEDURE — 82607 VITAMIN B-12: CPT

## 2023-03-03 PROCEDURE — 82306 VITAMIN D 25 HYDROXY: CPT

## 2023-03-03 PROCEDURE — 96413 CHEMO IV INFUSION 1 HR: CPT

## 2023-03-03 PROCEDURE — 96415 CHEMO IV INFUSION ADDL HR: CPT

## 2023-03-03 PROCEDURE — 85652 RBC SED RATE AUTOMATED: CPT

## 2023-03-03 PROCEDURE — 80053 COMPREHEN METABOLIC PANEL: CPT

## 2023-03-03 PROCEDURE — 85025 COMPLETE CBC W/AUTO DIFF WBC: CPT

## 2023-03-03 PROCEDURE — 80061 LIPID PANEL: CPT

## 2023-03-03 PROCEDURE — 36415 COLL VENOUS BLD VENIPUNCTURE: CPT

## 2023-03-03 NOTE — PROGRESS NOTES
Pt here for remicade odered every 8 weeks   Feeling overall body soreness especially in joints of her hands. Was [place], last infused with remicade for UP last year. Pt tolerated infusion without difficulty or complaint.        Education Record    Learner:  Patient    Disease / Diagnosis:    Barriers / Limitations:  None   Comments:    Method:  Discussion   Comments:    General Topics:  Medication   Comments: ordered every 8 weeks, returns 4/28    Outcome:  Shows understanding

## 2023-03-08 ENCOUNTER — TELEPHONE (OUTPATIENT)
Facility: CLINIC | Age: 73
End: 2023-03-08

## 2023-03-08 NOTE — TELEPHONE ENCOUNTER
Lab (sed rate)  recall placed in pt outreach, due 6/3/23. Amilcar Diane MD  [facility] Clinical Staff  Noted elevated sed rate   Recall labs in 3 months

## 2023-04-24 ENCOUNTER — TELEPHONE (OUTPATIENT)
Facility: CLINIC | Age: 73
End: 2023-04-24

## 2023-04-24 NOTE — TELEPHONE ENCOUNTER
Called patient, name/ verified. Pt was referring to  appt with Dr. Arjun Nicole    Discussed result of sed rate and 3 month recall in  per Dr. Arjun Nicole. She said she will be in the [place] from May 15 to early to mid July. Instructed pt to let Dr. Arjun Nicole know of above when she sees her on Wednesday. Patient verbalized understanding, no further concerns, issues at this time. No further action required. TE closed.

## 2023-04-24 NOTE — TELEPHONE ENCOUNTER
Pt called to confirm if she is due for any testing before her next appt. She thinks she is due for sed rate. Please call.

## 2023-04-26 ENCOUNTER — OFFICE VISIT (OUTPATIENT)
Dept: GASTROENTEROLOGY | Facility: CLINIC | Age: 73
End: 2023-04-26

## 2023-04-26 ENCOUNTER — PATIENT MESSAGE (OUTPATIENT)
Dept: GASTROENTEROLOGY | Facility: CLINIC | Age: 73
End: 2023-04-26

## 2023-04-26 VITALS
WEIGHT: 156 LBS | HEIGHT: 68 IN | DIASTOLIC BLOOD PRESSURE: 61 MMHG | BODY MASS INDEX: 23.64 KG/M2 | HEART RATE: 71 BPM | SYSTOLIC BLOOD PRESSURE: 100 MMHG

## 2023-04-26 DIAGNOSIS — K64.8 INTERNAL HEMORRHOIDS: ICD-10-CM

## 2023-04-26 DIAGNOSIS — D84.9 IMMUNOSUPPRESSED STATUS (HCC): ICD-10-CM

## 2023-04-26 DIAGNOSIS — K51.00 ULCERATIVE PANCOLITIS WITHOUT COMPLICATION (HCC): Primary | ICD-10-CM

## 2023-04-26 DIAGNOSIS — L88 PYODERMA GANGRENOSUM: ICD-10-CM

## 2023-04-26 PROCEDURE — 1126F AMNT PAIN NOTED NONE PRSNT: CPT | Performed by: INTERNAL MEDICINE

## 2023-04-26 PROCEDURE — 99214 OFFICE O/P EST MOD 30 MIN: CPT | Performed by: INTERNAL MEDICINE

## 2023-04-26 RX ORDER — MULTIVIT-MIN/FA/LYCOPEN/LUTEIN .4-300-25
1 TABLET ORAL DAILY
COMMUNITY

## 2023-04-26 NOTE — PATIENT INSTRUCTIONS
- Reviewed labs, SED rate was elevated, now feeling better  - ok continuing the Remicade  - colitis under control  - Would continue at this time  - discussed options like Humira and working with doc in [place] if going to be there 4-5 months of the year  - Labs ordered for recall 3 months      Discussed annual screenings   -Influenza  Annual Exams:   -Breast   -Blood pressure   -Ophtho-- exam   -Skin cancer-- yearly  Radiology:   -DXA scan-- osteoporosis/osteopenia   -Mammogram  Colonoscopy:   -Postoperative (within 6-12 mo)   -Dysplasia surveillance    Labs:   -CBC   -LFTs   -Creatinine   -B12/folate/iron   -Vitamin D (25 OH vitamin D)   -Lipids/glucose Detail Level: Zone

## 2023-04-27 ENCOUNTER — TELEPHONE (OUTPATIENT)
Facility: CLINIC | Age: 73
End: 2023-04-27

## 2023-04-27 NOTE — TELEPHONE ENCOUNTER
Dr. Abdirizak Perez,    I spoke with Guido Fothergill. She has scheduled Remicade infusion tomorrow. She will be out of town beginning May 15 and she will be back July 7. She is asking if it is ok for her to spread out her schedule and do the infusion next week instead of tomorrow so she's not so far behind schedule when comes back on July 7. Please advise. Thank you.

## 2023-04-27 NOTE — TELEPHONE ENCOUNTER
Per Curtis's text message: \"Yes can spread it out one week if she wants. Ideally as I told her yesterday should be every 8 weeks\". I spoke with Kaia Blankenship and relayed above message. She verbalized understanding and said that she will call infusion center. No further action required. TE closed.

## 2023-04-27 NOTE — TELEPHONE ENCOUNTER
From: Parul Cardona  To: Nabil Cook MD  Sent: 4/26/2023 11:29 PM CDT  Subject: scheduled infusion    Hi Dr Eyal Miller, I just spoke with you today about my upcoming remicade infusion scheduled for this Friday, 4/28 (8 weeks since the last one). I also told you that I'll be going out to the [facility] beginning on May 15 & my expected return was close to mid-July, which would put me out to 10 to 11 weeks. I'm wondering if I could reschedule this Friday's infusion to May 5th? This would be 9 weeks since the last one but then the next one would be approx 9 weeks later, if I'm able to schedule it for July 7th. Please advise asap.  Robinson Michaud

## 2023-04-28 ENCOUNTER — APPOINTMENT (OUTPATIENT)
Dept: HEMATOLOGY/ONCOLOGY | Facility: HOSPITAL | Age: 73
End: 2023-04-28
Attending: INTERNAL MEDICINE
Payer: MEDICARE

## 2023-04-28 ENCOUNTER — TELEPHONE (OUTPATIENT)
Dept: HEMATOLOGY/ONCOLOGY | Facility: HOSPITAL | Age: 73
End: 2023-04-28

## 2023-04-28 NOTE — TELEPHONE ENCOUNTER
Per answering service  4/27/23  4:53pm    Scheduled for an Infusion  4/28/23  Have you received information from Dr. Kalia Mtz regards to seeing if rescheduling for next week is a possibility? ?

## 2023-05-02 ENCOUNTER — OFFICE VISIT (OUTPATIENT)
Facility: CLINIC | Age: 73
End: 2023-05-02

## 2023-05-02 VITALS
BODY MASS INDEX: 23.64 KG/M2 | RESPIRATION RATE: 18 BRPM | TEMPERATURE: 98 F | SYSTOLIC BLOOD PRESSURE: 104 MMHG | WEIGHT: 156 LBS | DIASTOLIC BLOOD PRESSURE: 52 MMHG | HEIGHT: 68 IN | OXYGEN SATURATION: 98 % | HEART RATE: 72 BPM

## 2023-05-02 DIAGNOSIS — D84.9 IMMUNOSUPPRESSED STATUS (HCC): ICD-10-CM

## 2023-05-02 DIAGNOSIS — Z00.00 MEDICARE ANNUAL WELLNESS VISIT, SUBSEQUENT: Primary | ICD-10-CM

## 2023-05-02 DIAGNOSIS — M85.80 OSTEOPENIA, UNSPECIFIED LOCATION: ICD-10-CM

## 2023-05-02 DIAGNOSIS — E78.00 HYPERCHOLESTEROLEMIA: ICD-10-CM

## 2023-05-02 DIAGNOSIS — R91.8 LUNG NODULES: ICD-10-CM

## 2023-05-02 DIAGNOSIS — Z71.89 COUNSELING REGARDING ADVANCED DIRECTIVES: ICD-10-CM

## 2023-05-02 DIAGNOSIS — G62.9 NEUROPATHY: ICD-10-CM

## 2023-05-02 DIAGNOSIS — K51.00 ULCERATIVE PANCOLITIS WITHOUT COMPLICATION (HCC): ICD-10-CM

## 2023-05-02 DIAGNOSIS — R73.03 PREDIABETES: ICD-10-CM

## 2023-05-02 PROBLEM — G58.9 COMPRESSION NEUROPATHY: Status: RESOLVED | Noted: 2017-11-15 | Resolved: 2023-05-02

## 2023-05-02 PROBLEM — M81.0 AGE-RELATED OSTEOPOROSIS WITHOUT CURRENT PATHOLOGICAL FRACTURE: Status: RESOLVED | Noted: 2022-04-18 | Resolved: 2023-05-02

## 2023-05-02 PROBLEM — L30.9 DERMATITIS: Status: RESOLVED | Noted: 2022-04-18 | Resolved: 2023-05-02

## 2023-05-02 PROCEDURE — 99214 OFFICE O/P EST MOD 30 MIN: CPT | Performed by: INTERNAL MEDICINE

## 2023-05-02 PROCEDURE — G0439 PPPS, SUBSEQ VISIT: HCPCS | Performed by: INTERNAL MEDICINE

## 2023-05-02 PROCEDURE — 1125F AMNT PAIN NOTED PAIN PRSNT: CPT | Performed by: INTERNAL MEDICINE

## 2023-05-02 PROCEDURE — 99497 ADVNCD CARE PLAN 30 MIN: CPT | Performed by: INTERNAL MEDICINE

## 2023-05-02 RX ORDER — MULTIVIT WITH MINERALS/LUTEIN
1000 TABLET ORAL DAILY
COMMUNITY

## 2023-05-02 RX ORDER — MILK THISTLE 150 MG
CAPSULE ORAL
COMMUNITY

## 2023-05-02 NOTE — ASSESSMENT & PLAN NOTE
Patient had a flare of extraintestinal manifestations of ulcerative colitis. She had severe arthritis which resolved with treatment of her Crohn's with Remicade. She is doing well now.   Follow-up with gastroenterologist.

## 2023-05-02 NOTE — ASSESSMENT & PLAN NOTE
Symptoms have been worsening slightly. Gabapentin was not effective. Patient does not want treatment at this time.

## 2023-05-02 NOTE — PATIENT INSTRUCTIONS
Madelyn Machado's SCREENING SCHEDULE   Tests on this list are recommended by your physician but may not be covered, or covered at this frequency, by your insurer. Please check with your insurance carrier before scheduling to verify coverage. PREVENTATIVE SERVICES FREQUENCY &  COVERAGE DETAILS LAST COMPLETION DATE   Diabetes Screening    Fasting Blood Sugar /  Glucose    One screening every 12 months if never tested or if previously tested but not diagnosed with pre-diabetes   One screening every 6 months if diagnosed with pre-diabetes Lab Results   Component Value Date     (H) 03/03/2023        Cardiovascular Disease Screening    Lipid Panel  Cholesterol  Lipoprotein (HDL)  Triglycerides Covered every 5 years for all Medicare beneficiaries without apparent signs or symptoms of cardiovascular disease Lab Results   Component Value Date    CHOLEST 163 03/03/2023    HDL 78 (H) 03/03/2023    LDL 75 03/03/2023    LDLD 156 01/05/2022    TRIG 47 03/03/2023         Electrocardiogram (EKG)   Covered if needed at Welcome to Medicare, and non-screening if indicated for medical reasons 11/11/2019      Ultrasound Screening for Abdominal Aortic Aneurysm (AAA) Covered once in a lifetime for one of the following risk factors    Men who are 73-68 years old and have ever smoked    Anyone with a family history -     Colorectal Cancer Screening  Covered for ages 52-80; only need ONE of the following:    Colonoscopy   Covered every 10 years    Covered every 2 years if patient is at high risk or previous colonoscopy was abnormal 12/15/2021    Colorectal Cancer Screening due on 12/15/2023    Flexible Sigmoidoscopy   Covered every 4 years -    Fecal Occult Blood Test Covered annually -   Bone Density Screening    Bone density screening    Covered every 2 years after age 72 if diagnosed with risk of osteoporosis or estrogen deficiency.     Covered yearly for long-term glucocorticoid medication use (Steroids) Last Dexa Scan:    XR DEXA BONE DENSITOMETRY (CPT=77080) 08/01/2022      No recommendations at this time   Pap and Pelvic    Pap   Covered every 2 years for women at normal risk;  Annually if at high risk -  No recommendations at this time    Chlamydia Annually if high risk -  No recommendations at this time   Screening Mammogram    Mammogram     Recommend annually for all female patients aged 36 and older    One baseline mammogram covered for patients aged 32-38 02/23/2023    Mammogram due on 02/23/2024    Immunizations    Influenza Covered once per flu season  Please get every year -  No recommendations at this time    Pneumococcal Each vaccine (Iivzhxv60 & Zfbdpyldt21) covered once after 65 Prevnar 13: 11/15/2017    Alfqfcspr47: 07/14/2020     No recommendations at this time    Hepatitis B One screening covered for patients with certain risk factors   -  No recommendations at this time    Tetanus Toxoid Not covered by Medicare Part B unless medically necessary (cut with metal); may be covered with your pharmacy prescription benefits -    Tetanus, Diptheria and Pertusis TD and TDaP Not covered by Medicare Part B -  No recommendations at this time    Zoster Not covered by Medicare Part B; may be covered with your pharmacy  prescription benefits -  Zoster Vaccines(1 of 2) Never done

## 2023-05-02 NOTE — ASSESSMENT & PLAN NOTE
Discussed advanced directives. Pt's sister would be primary agent for POA. Pt does not want extraordinary measures. POA st and patient will complete at home. kristen in the office   I spent 16+ minutes with pt counseling regarding advances directives.

## 2023-05-09 ENCOUNTER — OFFICE VISIT (OUTPATIENT)
Dept: HEMATOLOGY/ONCOLOGY | Facility: HOSPITAL | Age: 73
End: 2023-05-09
Attending: INTERNAL MEDICINE
Payer: MEDICARE

## 2023-05-09 VITALS
TEMPERATURE: 98 F | HEART RATE: 64 BPM | DIASTOLIC BLOOD PRESSURE: 50 MMHG | WEIGHT: 159.19 LBS | RESPIRATION RATE: 16 BRPM | OXYGEN SATURATION: 98 % | BODY MASS INDEX: 24 KG/M2 | SYSTOLIC BLOOD PRESSURE: 112 MMHG

## 2023-05-09 DIAGNOSIS — K51.00 ULCERATIVE PANCOLITIS WITHOUT COMPLICATION (HCC): Primary | ICD-10-CM

## 2023-05-09 PROCEDURE — 96375 TX/PRO/DX INJ NEW DRUG ADDON: CPT

## 2023-05-09 PROCEDURE — 96413 CHEMO IV INFUSION 1 HR: CPT

## 2023-05-09 PROCEDURE — 96415 CHEMO IV INFUSION ADDL HR: CPT

## 2023-05-09 NOTE — PROGRESS NOTES
Patient arrives ambulating independently for Q8 week REMICADE infusion for ulcerative colitis. Denies any complaints upon arrival. Tolerated infusion well, no s/s of adverse reaction. Vital signs stable. PIV removed, 2x2 gauze and coban applied. Discharged home, stable and aware of upcoming appointments. Patient aware that order expires 6/2/23. Patient to contact ordering provider prior to next appointment.

## 2023-05-15 ENCOUNTER — PATIENT MESSAGE (OUTPATIENT)
Dept: GASTROENTEROLOGY | Facility: CLINIC | Age: 73
End: 2023-05-15

## 2023-05-17 ENCOUNTER — TELEPHONE (OUTPATIENT)
Facility: CLINIC | Age: 73
End: 2023-05-17

## 2023-05-17 RX ORDER — DIPHENHYDRAMINE HYDROCHLORIDE 50 MG/ML
25 INJECTION INTRAMUSCULAR; INTRAVENOUS ONCE
OUTPATIENT
Start: 2023-05-17

## 2023-05-17 RX ORDER — ACETAMINOPHEN 325 MG/1
650 TABLET ORAL ONCE
OUTPATIENT
Start: 2023-05-17

## 2023-05-17 RX ORDER — ACETAMINOPHEN 325 MG/1
650 TABLET ORAL EVERY 6 HOURS PRN
OUTPATIENT
Start: 2023-05-17

## 2023-05-17 RX ORDER — DIPHENHYDRAMINE HCL 25 MG
25 CAPSULE ORAL ONCE
OUTPATIENT
Start: 2023-05-17

## 2023-05-17 NOTE — TELEPHONE ENCOUNTER
Dr. Boom Stewart,    Pt needs renewal of her remicade infusion, please see pt message below. Please review and sign order. Thank you.

## 2023-05-19 NOTE — TELEPHONE ENCOUNTER
Patient with Medicare primary and BCBSIL supplement, no pa required. Message sent to Infusion pools .

## 2023-05-19 NOTE — TELEPHONE ENCOUNTER
Infliximab orders signed by MD and approved by insurance, no PA needed. Message already sent to infusion pool.  No further action required, will close TE.

## 2023-05-26 ENCOUNTER — TELEPHONE (OUTPATIENT)
Facility: CLINIC | Age: 73
End: 2023-05-26

## 2023-05-26 NOTE — TELEPHONE ENCOUNTER
1st,overdue reminder letter mailed out to patient   Labs order   C-REACTIVE PROTEIN   CBC, PLATELET; NO DIFFERENTIAL   COMP METABOLIC PANEL (14)   SED RATE, RAMOS (AUTOMATED)

## 2023-07-11 ENCOUNTER — LAB ENCOUNTER (OUTPATIENT)
Dept: LAB | Facility: HOSPITAL | Age: 73
End: 2023-07-11
Attending: INTERNAL MEDICINE
Payer: MEDICARE

## 2023-07-11 ENCOUNTER — TELEPHONE (OUTPATIENT)
Facility: CLINIC | Age: 73
End: 2023-07-11

## 2023-07-11 ENCOUNTER — OFFICE VISIT (OUTPATIENT)
Dept: HEMATOLOGY/ONCOLOGY | Facility: HOSPITAL | Age: 73
End: 2023-07-11
Attending: INTERNAL MEDICINE
Payer: MEDICARE

## 2023-07-11 VITALS
WEIGHT: 156.38 LBS | BODY MASS INDEX: 24 KG/M2 | TEMPERATURE: 99 F | DIASTOLIC BLOOD PRESSURE: 58 MMHG | HEART RATE: 67 BPM | SYSTOLIC BLOOD PRESSURE: 128 MMHG | OXYGEN SATURATION: 98 % | RESPIRATION RATE: 16 BRPM

## 2023-07-11 DIAGNOSIS — L88 PYODERMA GANGRENOSUM: ICD-10-CM

## 2023-07-11 DIAGNOSIS — K64.8 INTERNAL HEMORRHOIDS: ICD-10-CM

## 2023-07-11 DIAGNOSIS — K51.00 ULCERATIVE PANCOLITIS WITHOUT COMPLICATION (HCC): Primary | ICD-10-CM

## 2023-07-11 DIAGNOSIS — D84.9 IMMUNOSUPPRESSED STATUS (HCC): ICD-10-CM

## 2023-07-11 DIAGNOSIS — K51.00 ULCERATIVE PANCOLITIS WITHOUT COMPLICATION (HCC): ICD-10-CM

## 2023-07-11 LAB
ALBUMIN SERPL-MCNC: 3.6 G/DL (ref 3.4–5)
ALBUMIN/GLOB SERPL: 0.9 {RATIO} (ref 1–2)
ALP LIVER SERPL-CCNC: 63 U/L
ALT SERPL-CCNC: 33 U/L
ANION GAP SERPL CALC-SCNC: 11 MMOL/L (ref 0–18)
AST SERPL-CCNC: 30 U/L (ref 15–37)
BILIRUB SERPL-MCNC: 0.5 MG/DL (ref 0.1–2)
BUN BLD-MCNC: 18 MG/DL (ref 7–18)
BUN/CREAT SERPL: 23.4 (ref 10–20)
CALCIUM BLD-MCNC: 8.8 MG/DL (ref 8.5–10.1)
CHLORIDE SERPL-SCNC: 107 MMOL/L (ref 98–112)
CO2 SERPL-SCNC: 25 MMOL/L (ref 21–32)
CREAT BLD-MCNC: 0.77 MG/DL
CRP SERPL-MCNC: <0.29 MG/DL (ref ?–0.3)
DEPRECATED RDW RBC AUTO: 45.2 FL (ref 35.1–46.3)
ERYTHROCYTE [DISTWIDTH] IN BLOOD BY AUTOMATED COUNT: 13.4 % (ref 11–15)
ERYTHROCYTE [SEDIMENTATION RATE] IN BLOOD: 35 MM/HR
FASTING STATUS PATIENT QL REPORTED: NO
GFR SERPLBLD BASED ON 1.73 SQ M-ARVRAT: 82 ML/MIN/1.73M2 (ref 60–?)
GLOBULIN PLAS-MCNC: 4.2 G/DL (ref 2.8–4.4)
GLUCOSE BLD-MCNC: 170 MG/DL (ref 70–99)
HCT VFR BLD AUTO: 37.4 %
HGB BLD-MCNC: 12.5 G/DL
MCH RBC QN AUTO: 30.6 PG (ref 26–34)
MCHC RBC AUTO-ENTMCNC: 33.4 G/DL (ref 31–37)
MCV RBC AUTO: 91.7 FL
OSMOLALITY SERPL CALC.SUM OF ELEC: 302 MOSM/KG (ref 275–295)
PLATELET # BLD AUTO: 263 10(3)UL (ref 150–450)
POTASSIUM SERPL-SCNC: 3.8 MMOL/L (ref 3.5–5.1)
PROT SERPL-MCNC: 7.8 G/DL (ref 6.4–8.2)
RBC # BLD AUTO: 4.08 X10(6)UL
SODIUM SERPL-SCNC: 143 MMOL/L (ref 136–145)
WBC # BLD AUTO: 5.7 X10(3) UL (ref 4–11)

## 2023-07-11 PROCEDURE — 85027 COMPLETE CBC AUTOMATED: CPT

## 2023-07-11 PROCEDURE — 96413 CHEMO IV INFUSION 1 HR: CPT

## 2023-07-11 PROCEDURE — 96375 TX/PRO/DX INJ NEW DRUG ADDON: CPT

## 2023-07-11 PROCEDURE — 80053 COMPREHEN METABOLIC PANEL: CPT

## 2023-07-11 PROCEDURE — 96415 CHEMO IV INFUSION ADDL HR: CPT

## 2023-07-11 PROCEDURE — 36415 COLL VENOUS BLD VENIPUNCTURE: CPT

## 2023-07-11 PROCEDURE — 86140 C-REACTIVE PROTEIN: CPT

## 2023-07-11 PROCEDURE — 85652 RBC SED RATE AUTOMATED: CPT

## 2023-07-11 RX ORDER — DIPHENHYDRAMINE HYDROCHLORIDE 50 MG/ML
25 INJECTION INTRAMUSCULAR; INTRAVENOUS ONCE
Status: DISCONTINUED | OUTPATIENT
Start: 2023-07-11 | End: 2023-07-11

## 2023-07-11 RX ORDER — DIPHENHYDRAMINE HCL 25 MG
25 CAPSULE ORAL ONCE
Status: DISCONTINUED | OUTPATIENT
Start: 2023-07-11 | End: 2023-07-11

## 2023-07-11 RX ORDER — DIPHENHYDRAMINE HCL 25 MG
25 CAPSULE ORAL ONCE
OUTPATIENT
Start: 2023-09-05

## 2023-07-11 RX ORDER — ACETAMINOPHEN 325 MG/1
650 TABLET ORAL ONCE
Status: DISCONTINUED | OUTPATIENT
Start: 2023-07-11 | End: 2023-07-11

## 2023-07-11 RX ORDER — ACETAMINOPHEN 325 MG/1
650 TABLET ORAL ONCE
OUTPATIENT
Start: 2023-09-05

## 2023-07-11 RX ORDER — ACETAMINOPHEN 325 MG/1
650 TABLET ORAL EVERY 6 HOURS PRN
OUTPATIENT
Start: 2023-09-05

## 2023-07-11 RX ORDER — DIPHENHYDRAMINE HYDROCHLORIDE 50 MG/ML
25 INJECTION INTRAMUSCULAR; INTRAVENOUS ONCE
OUTPATIENT
Start: 2023-09-05

## 2023-07-11 NOTE — TELEPHONE ENCOUNTER
[facility] called to clarify pre medication for pt. Pt is there right now for infusion. Call transferred to RN.

## 2023-07-11 NOTE — PROGRESS NOTES
Patient here for remicade. Pt denies any issues or concerns, reports stability in ulcerative colitis symptoms. Pt refused tylenol/benadryl premedication but reports taking solu-CORTEF with past infusions. Orders received from MD to add steroid premedication to treatment plan. Ordering MD: Dr. Shabnam Turpin    Pt tolerated infusion without difficulty or complaint, no adverse reactions noted. VSS. PIV removed intact upon completion of therapy, gauze and coban dressing applied. Reviewed next appointment date/time. Pt discharged stable, ambulating with steady gait independently.     Education Record    Learner:  Patient    Disease / Diagnosis: Ulcerative Colitis    Barriers / Limitations:  None      Method:  Discussion and Reinforcement      General Topics:  Plan of care reviewed      Outcome:  Shows understanding

## 2023-07-11 NOTE — TELEPHONE ENCOUNTER
Noted. TE closed.     MD Milind Salcedo Antoinette, RN  Cc: [facility] Clinical Staff  Caller: Unspecified (Today,  2:14 PM)  Please continue with iv hydrocortisone

## 2023-07-11 NOTE — TELEPHONE ENCOUNTER
Dr Jennifer Spurling,    [facility]:    I spoke to Dieudonne alonzo RN at the [facility]     She wanted to know if it was ok to give the IV hydrocortisone to the pt prior to her Remicade infusion    She states the pt has been receiving IV hydrocortisone with all of her Remicade infusions in the past    Dr Jennifer Spurling is aware pt receives the hydrocortisone with her infusions. 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

## 2023-07-13 ENCOUNTER — TELEPHONE (OUTPATIENT)
Dept: GASTROENTEROLOGY | Facility: CLINIC | Age: 73
End: 2023-07-13

## 2023-07-19 ENCOUNTER — NURSE TRIAGE (OUTPATIENT)
Dept: INTERNAL MEDICINE CLINIC | Facility: CLINIC | Age: 73
End: 2023-07-19

## 2023-07-19 NOTE — TELEPHONE ENCOUNTER
Please reply to pool: EM RN TRIAGE  Action Requested: Summary for Provider     []  Critical Lab, Recommendations Needed  [] Need Additional Advice  [x]   FYI    []   Need Orders  [] Need Medications Sent to Pharmacy  []  Other     SUMMARY: Patient contacts clinic reporting right ear pain x 1 month. She is a swimmer. Deep itching in ear and pressure/pain. Denies drainage or discharge. Denies head or chest congestion. Denies fever, body aches or chills. Acute visit booked tomorrow 7/20.     Reason for call: Acute  Onset: Data Unavailable                       Reason for Disposition   All other earaches  (Exceptions: Earache lasting < 1 hour, and earache from air travel.)    Protocols used: Alecia Hays

## 2023-07-20 ENCOUNTER — OFFICE VISIT (OUTPATIENT)
Dept: INTERNAL MEDICINE CLINIC | Facility: CLINIC | Age: 73
End: 2023-07-20

## 2023-07-20 VITALS
WEIGHT: 155 LBS | BODY MASS INDEX: 23.49 KG/M2 | SYSTOLIC BLOOD PRESSURE: 97 MMHG | DIASTOLIC BLOOD PRESSURE: 57 MMHG | HEIGHT: 68 IN | HEART RATE: 68 BPM

## 2023-07-20 DIAGNOSIS — H60.331 ACUTE SWIMMER'S EAR OF RIGHT SIDE: Primary | ICD-10-CM

## 2023-07-20 DIAGNOSIS — H61.21 IMPACTED CERUMEN OF RIGHT EAR: ICD-10-CM

## 2023-07-20 PROCEDURE — 1126F AMNT PAIN NOTED NONE PRSNT: CPT | Performed by: INTERNAL MEDICINE

## 2023-07-20 PROCEDURE — 69210 REMOVE IMPACTED EAR WAX UNI: CPT | Performed by: INTERNAL MEDICINE

## 2023-07-20 PROCEDURE — 99213 OFFICE O/P EST LOW 20 MIN: CPT | Performed by: INTERNAL MEDICINE

## 2023-07-20 RX ORDER — HYDROCORTISONE AND ACETIC ACID 20.75; 10.375 MG/ML; MG/ML
3 SOLUTION AURICULAR (OTIC) 2 TIMES DAILY
Qty: 10 ML | Refills: 0 | Status: SHIPPED | OUTPATIENT
Start: 2023-07-20 | End: 2023-07-30

## 2023-08-03 ENCOUNTER — PATIENT MESSAGE (OUTPATIENT)
Dept: GASTROENTEROLOGY | Facility: CLINIC | Age: 73
End: 2023-08-03

## 2023-09-05 ENCOUNTER — OFFICE VISIT (OUTPATIENT)
Dept: HEMATOLOGY/ONCOLOGY | Facility: HOSPITAL | Age: 73
End: 2023-09-05
Attending: INTERNAL MEDICINE
Payer: MEDICARE

## 2023-09-05 VITALS
HEART RATE: 57 BPM | RESPIRATION RATE: 16 BRPM | OXYGEN SATURATION: 96 % | DIASTOLIC BLOOD PRESSURE: 50 MMHG | BODY MASS INDEX: 23 KG/M2 | SYSTOLIC BLOOD PRESSURE: 93 MMHG | TEMPERATURE: 98 F | WEIGHT: 151.81 LBS

## 2023-09-05 DIAGNOSIS — K51.00 ULCERATIVE PANCOLITIS WITHOUT COMPLICATION (HCC): Primary | ICD-10-CM

## 2023-09-05 PROCEDURE — 96415 CHEMO IV INFUSION ADDL HR: CPT

## 2023-09-05 PROCEDURE — 96375 TX/PRO/DX INJ NEW DRUG ADDON: CPT

## 2023-09-05 PROCEDURE — 96413 CHEMO IV INFUSION 1 HR: CPT

## 2023-09-05 RX ORDER — DIPHENHYDRAMINE HCL 25 MG
25 CAPSULE ORAL ONCE
OUTPATIENT
Start: 2023-10-31

## 2023-09-05 RX ORDER — ACETAMINOPHEN 325 MG/1
650 TABLET ORAL EVERY 6 HOURS PRN
OUTPATIENT
Start: 2023-10-31

## 2023-09-05 RX ORDER — ACETAMINOPHEN 325 MG/1
650 TABLET ORAL ONCE
OUTPATIENT
Start: 2023-10-31

## 2023-09-05 RX ORDER — DIPHENHYDRAMINE HYDROCHLORIDE 50 MG/ML
25 INJECTION INTRAMUSCULAR; INTRAVENOUS ONCE
OUTPATIENT
Start: 2023-10-31

## 2023-09-05 NOTE — PROGRESS NOTES
Patient arrives ambulating independently for Q8 week REMICADE infusion for ulcerative colitis. Denies any complaints upon arrival. Tolerated infusion well, no s/s of adverse reaction. Vital signs stable. PIV removed, 2x2 gauze and coban applied. Discharged home, stable and aware of upcoming appointments.

## 2023-09-19 ENCOUNTER — OFFICE VISIT (OUTPATIENT)
Facility: CLINIC | Age: 73
End: 2023-09-19

## 2023-09-19 VITALS
TEMPERATURE: 98 F | SYSTOLIC BLOOD PRESSURE: 108 MMHG | BODY MASS INDEX: 23.04 KG/M2 | HEART RATE: 66 BPM | HEIGHT: 68 IN | DIASTOLIC BLOOD PRESSURE: 56 MMHG | WEIGHT: 152 LBS | OXYGEN SATURATION: 98 % | RESPIRATION RATE: 18 BRPM

## 2023-09-19 DIAGNOSIS — K51.00 ULCERATIVE PANCOLITIS WITHOUT COMPLICATION (HCC): ICD-10-CM

## 2023-09-19 DIAGNOSIS — H92.01 RIGHT EAR PAIN: Primary | ICD-10-CM

## 2023-09-19 PROCEDURE — 1125F AMNT PAIN NOTED PAIN PRSNT: CPT | Performed by: INTERNAL MEDICINE

## 2023-09-19 PROCEDURE — 90662 IIV NO PRSV INCREASED AG IM: CPT | Performed by: INTERNAL MEDICINE

## 2023-09-19 PROCEDURE — G0008 ADMIN INFLUENZA VIRUS VAC: HCPCS | Performed by: INTERNAL MEDICINE

## 2023-09-19 PROCEDURE — 99213 OFFICE O/P EST LOW 20 MIN: CPT | Performed by: INTERNAL MEDICINE

## 2023-09-19 NOTE — ASSESSMENT & PLAN NOTE
Patient previously had swimmer's ear and may have the beginnings of this. Advised patient to continue to wear earplugs when she swims. Call back if symptoms worsen.

## 2023-10-19 ENCOUNTER — LAB ENCOUNTER (OUTPATIENT)
Dept: LAB | Age: 73
End: 2023-10-19
Attending: INTERNAL MEDICINE
Payer: MEDICARE

## 2023-10-19 DIAGNOSIS — R73.03 PREDIABETES: ICD-10-CM

## 2023-10-19 LAB
EST. AVERAGE GLUCOSE BLD GHB EST-MCNC: 126 MG/DL (ref 68–126)
HBA1C MFR BLD: 6 % (ref ?–5.7)

## 2023-10-19 PROCEDURE — 83036 HEMOGLOBIN GLYCOSYLATED A1C: CPT

## 2023-10-19 PROCEDURE — 36415 COLL VENOUS BLD VENIPUNCTURE: CPT

## 2023-10-30 ENCOUNTER — OFFICE VISIT (OUTPATIENT)
Facility: CLINIC | Age: 73
End: 2023-10-30

## 2023-10-30 VITALS
RESPIRATION RATE: 18 BRPM | HEART RATE: 68 BPM | OXYGEN SATURATION: 98 % | DIASTOLIC BLOOD PRESSURE: 54 MMHG | HEIGHT: 68 IN | SYSTOLIC BLOOD PRESSURE: 98 MMHG | WEIGHT: 151 LBS | BODY MASS INDEX: 22.88 KG/M2

## 2023-10-30 DIAGNOSIS — K51.00 ULCERATIVE PANCOLITIS WITHOUT COMPLICATION (HCC): ICD-10-CM

## 2023-10-30 DIAGNOSIS — M25.562 ACUTE PAIN OF LEFT KNEE: ICD-10-CM

## 2023-10-30 DIAGNOSIS — R73.03 PREDIABETES: Primary | ICD-10-CM

## 2023-10-30 PROBLEM — H92.01 RIGHT EAR PAIN: Status: RESOLVED | Noted: 2023-09-19 | Resolved: 2023-10-30

## 2023-10-30 PROCEDURE — 1125F AMNT PAIN NOTED PAIN PRSNT: CPT | Performed by: INTERNAL MEDICINE

## 2023-10-30 PROCEDURE — 99213 OFFICE O/P EST LOW 20 MIN: CPT | Performed by: INTERNAL MEDICINE

## 2023-10-30 NOTE — PATIENT INSTRUCTIONS
Take Aleve (naproxen), 1-2 tab twice daily, unless it bothers your stomach. My last day will  be January 11, 2024.   I recommend the following providers:    [facility] ([facility] Medicine) [phone]  Dr. Edgar Lorenzo (Internal Medicine):  [phone]  Dimitri Munguia

## 2023-10-30 NOTE — ASSESSMENT & PLAN NOTE
Pt had mild arthritis on x-ray in 2015. She will continue Aleve and see ortho if pain doesn't improve.

## 2023-10-31 ENCOUNTER — APPOINTMENT (OUTPATIENT)
Dept: HEMATOLOGY/ONCOLOGY | Facility: HOSPITAL | Age: 73
End: 2023-10-31
Attending: INTERNAL MEDICINE
Payer: MEDICARE

## 2023-11-06 ENCOUNTER — OFFICE VISIT (OUTPATIENT)
Dept: HEMATOLOGY/ONCOLOGY | Facility: HOSPITAL | Age: 73
End: 2023-11-06
Attending: INTERNAL MEDICINE
Payer: MEDICARE

## 2023-11-06 VITALS
RESPIRATION RATE: 16 BRPM | DIASTOLIC BLOOD PRESSURE: 63 MMHG | HEART RATE: 55 BPM | SYSTOLIC BLOOD PRESSURE: 127 MMHG | OXYGEN SATURATION: 96 % | TEMPERATURE: 99 F | WEIGHT: 150 LBS | BODY MASS INDEX: 23 KG/M2

## 2023-11-06 DIAGNOSIS — K51.00 ULCERATIVE PANCOLITIS WITHOUT COMPLICATION (HCC): Primary | ICD-10-CM

## 2023-11-06 PROCEDURE — 96415 CHEMO IV INFUSION ADDL HR: CPT

## 2023-11-06 PROCEDURE — 96375 TX/PRO/DX INJ NEW DRUG ADDON: CPT

## 2023-11-06 PROCEDURE — 96413 CHEMO IV INFUSION 1 HR: CPT

## 2023-11-06 RX ORDER — ACETAMINOPHEN 325 MG/1
650 TABLET ORAL EVERY 6 HOURS PRN
OUTPATIENT
Start: 2023-12-25

## 2023-11-06 RX ORDER — ACETAMINOPHEN 325 MG/1
650 TABLET ORAL ONCE
OUTPATIENT
Start: 2023-12-25

## 2023-11-06 RX ORDER — DIPHENHYDRAMINE HYDROCHLORIDE 50 MG/ML
25 INJECTION INTRAMUSCULAR; INTRAVENOUS ONCE
OUTPATIENT
Start: 2023-12-25

## 2023-11-06 RX ORDER — DIPHENHYDRAMINE HCL 25 MG
25 CAPSULE ORAL ONCE
OUTPATIENT
Start: 2023-12-25

## 2023-11-06 NOTE — PROGRESS NOTES
Pt here for remicade . Pt denies any issues or concerns. Ordering MD: Curtis  Order Exp: 5/2024     Pt tolerated infusion without difficulty or complaint.  Reviewed next apt date/time: yes      Education Record  Learner:  Patient  Disease / Diagnosis: UP  Barriers / Limitations:  None  Method:  Discussion  General Topics:  Plan of care reviewed  Outcome:  Shows understanding

## 2024-01-02 ENCOUNTER — APPOINTMENT (OUTPATIENT)
Dept: HEMATOLOGY/ONCOLOGY | Facility: HOSPITAL | Age: 74
End: 2024-01-02
Attending: INTERNAL MEDICINE
Payer: MEDICARE

## 2024-03-15 ENCOUNTER — APPOINTMENT (OUTPATIENT)
Dept: HEMATOLOGY/ONCOLOGY | Facility: HOSPITAL | Age: 74
End: 2024-03-15
Attending: INTERNAL MEDICINE
Payer: MEDICARE

## 2024-04-15 ENCOUNTER — PATIENT MESSAGE (OUTPATIENT)
Dept: GASTROENTEROLOGY | Facility: CLINIC | Age: 74
End: 2024-04-15

## 2024-04-16 ENCOUNTER — TELEPHONE (OUTPATIENT)
Facility: CLINIC | Age: 74
End: 2024-04-16

## 2024-04-16 DIAGNOSIS — K51.919 ULCERATIVE COLITIS WITH COMPLICATION, UNSPECIFIED LOCATION (HCC): Primary | ICD-10-CM

## 2024-04-16 DIAGNOSIS — Z12.11 SCREEN FOR COLON CANCER: ICD-10-CM

## 2024-04-16 NOTE — TELEPHONE ENCOUNTER
Patient calling to see if any sooner appointments to see Dr Acevedo in June in the afternoon and so that way she is able to get approval for infusion for beginning of July. please call.

## 2024-04-16 NOTE — TELEPHONE ENCOUNTER
Dr. Acevedo,   Pt will be [place] in [place] end of [place]. She is scheduled for remicade infusion on May 2 there but would like to schedule her July infusion (and next couple) here in [place].      Pt said next clinic opening is July 10 and that she can't schedule infusions here without your authorization. Can I schedule pt for OB with you? Or are you agreeable to ordering infusion without office appt? Of note, pt is overdue for colonoscopy and is looking to schedule that too. Let me know how you want to proceed.  Thanks,  Karlee

## 2024-04-16 NOTE — TELEPHONE ENCOUNTER
From: Madelyn Machado  To: Yaratequila Acevedo  Sent: 4/15/2024 5:15 PM CDT  Subject: Infusion for July in [place]    I will need to get authorization for a remicade infusion in early July. [place] on Damon 11, Mar 7 & upcoming scheduled for May 2nd. I will be returning at end of May & not able to schedule with the ([place]) infusion dept without your authorization.   In another request sent earlier, I let you know that I'm not able to get an appt with your office until July 10 & will also need to schedule colonoscopy.  Please advise.  Thank you

## 2024-04-17 NOTE — TELEPHONE ENCOUNTER
Yes ok to overbook so I can also review labs and schedule colonoscopy  Ok to renew/order infliximab now

## 2024-04-18 NOTE — TELEPHONE ENCOUNTER
Dr. Acevedo,   Please see pended remicade orders and sign if agreeable. It looks like pt was getting 5mg/kg but please verify. I did order solucortef 200 mg IV as premed.      Also, you mentioned reviewing labs at office appt? Please see pended labs and sign if agreeable.    Pt scheduled for clinic appt on 6/5/24. Just want to verify if the plan is still do schedule colon while at office appt.  Thanks,  Karlee

## 2024-04-22 RX ORDER — POLYETHYLENE GLYCOL 3350, SODIUM CHLORIDE, SODIUM BICARBONATE, POTASSIUM CHLORIDE 420; 11.2; 5.72; 1.48 G/4L; G/4L; G/4L; G/4L
4000 POWDER, FOR SOLUTION ORAL AS DIRECTED
Qty: 4000 ML | Refills: 0 | Status: SHIPPED | OUTPATIENT
Start: 2024-04-22

## 2024-04-22 RX ORDER — ACETAMINOPHEN 325 MG/1
650 TABLET ORAL EVERY 6 HOURS PRN
OUTPATIENT
Start: 2024-04-22

## 2024-04-22 RX ORDER — DIPHENHYDRAMINE HYDROCHLORIDE 50 MG/ML
25 INJECTION INTRAMUSCULAR; INTRAVENOUS ONCE
OUTPATIENT
Start: 2024-04-22

## 2024-04-22 RX ORDER — ACETAMINOPHEN 325 MG/1
650 TABLET ORAL ONCE
OUTPATIENT
Start: 2024-04-22

## 2024-04-22 RX ORDER — DIPHENHYDRAMINE HCL 25 MG
25 CAPSULE ORAL ONCE
OUTPATIENT
Start: 2024-04-22

## 2024-04-22 NOTE — TELEPHONE ENCOUNTER
Yes recall is every 2 years so overdue    1. Schedule colonoscopy with MAC [Diagnosis: ulcerative colitis surveillance for colorectal cancer]    2.  bowel prep from pharmacy (split suprep or trilyte)    3. Continue all medications for procedure except    ** If MAC @ [facility]/NE:    - NO alcohol, recreational drugs nor erectile dysfunction mediations 72 hours before procedure(s)   - NO herbal supplements or weight loss medications x 7 days prior to the procedure(s)    ** If MAC @ [facility] or IV twilight - continue all medications as prescribed    4. Read all bowel prep instructions carefully    5. AVOID seeds, nuts, popcorn, raw fruits and vegetables (cooked is okay) for 5 days before procedure        >>>Please note: if you were prescribed Suprep for the bowel prep and it is too expensive or not covered by insurance, it is okay to substitute Trilyte (or any similar generic prep). This can be done by notifying the pharmacy or calling our office.

## 2024-04-22 NOTE — TELEPHONE ENCOUNTER
Dr. Acevedo,   Can you sign the remicade orders? It is showing up as not signed on my end. Let me know if there are issues with EPIC/signing.  Thanks,  Karlee

## 2024-04-22 NOTE — TELEPHONE ENCOUNTER
Dr. Acevedo,   Pt is scheduled for clinic with you on 24 but she will be going back to [place] in October. She was worried that procedure date will be too far out if she does not see you in clinic till . Are you agreeable to giving colon orders now?  Thanks,  Karlee Cooper Procedure, Date, MD:  Colonoscopy 12/15/21 Dr. Acevedo  Last Diagnosis:  chronic colitis, hx of UC  Recalled (mth/yrs): unsure but overdue per pt  Sedation Used Previously:  MAC  Last Prep Used (if known): colyte   Quality Of Prep (if known): good with washing  Anticoagulants: No  Diuretics: No  Diabetic Med's (PO/Injectables): No  Weight loss Med's: No  Iron/Herbal/Multivitamin Supplements (RX/OTC): Yes, intermittently  Marijuana/Vaping/CBD: No  Height & Weight: 5'8\"/151 lbs  BMI: 22.96  Hx of Cardiac/CVA Issues/(MI/Stroke): No  Devices Pacemaker/Defibrillator/Stents: No  Respiratory Issues/Oxygen Use/TERESA/COPD: No  Issues w/ Anesthesia: No    Symptoms (Y/N): No  Symptoms Details: No    Special Comments/Notes: N/A      Please advise on orders and prep. Meds, allergies, and pharmacy verified with pt.    Thank you!      Marcie Acevedo MD   Physician  Gastroenterology     Operative Report     Addendum     Date of Service: 12/15/2021  8:11 AM  Case Time: Procedures: Surgeons:   12/15/2021  7:39 AM COLONOSCOPY    Marcie Acevedo MD                 COLONOSCOPY REPORT           Madelyn Machado      1950 Age 71 year old   PCP Gayle Carlos MD Endoscopist Marcie Acevedo MD      Date of procedure: 19     Procedure: Colonoscopy w/biopsies     Pre-operative diagnosis: surveillance for CRC in UC patient     Post-operative diagnosis: pseudopolyps and decreased vascularity      Medications: MAC sedation  Withdrawal time: 12 minutes     Procedure:  Informed consent was obtained from the patient after the risks of the procedure were discussed, including but not limited to bleeding, perforation, aspiration, infection, or possibility of a missed lesion.  After discussions of the risks/benefits and alternatives to this procedure, as well as the planned sedation, the patient was placed in the left lateral decubitus position and begun on continuous blood pressure pulse oximetry and EKG monitoring and this was maintained throughout the procedure. Once an adequate level of sedation was obtained a digital rectal exam was completed. Then the lubricated tip of the [id number] diagnostic video colonoscope was inserted and advanced without difficulty to the cecum using the CO2 insufflation technique. The cecum was identified by localizing the trifold, the appendix and the ileocecal valve. Withdrawal was begun with thorough washing and careful examination of the colonic walls and folds. A routine second examination of the cecum/ascending colon was performed. Photodocumentation was obtained. The bowel prep was good. Views of the colon were good with washing. I then carefully withdrew the instrument from the patient who tolerated the procedure well.      Complications: none.     Findings:   1. Four quadrant biopsies were taken every 10 cm with a total of > 36 biopsies     2. Terminal ileum: the visualized mucosa appeared normal.     3. A retroflexed view of the rectum revealed small hemorrhoids.     4. The colonic mucosa throughout the colon had pseudopolyps, L>R and decreased vascularity of the left colon. No active inflammation or friability noted     5. JIGAR: normal rectal tone, no masses palpated.      Impression:   Pseudopolyps and decreased vascularity  No active colitis appreciated     Recommend:  Await pathology. The interval for the next colonoscopy will be determined after reviewing pathology. If new signs or symptoms develop, colonoscopy may need to be repeated sooner.   High fiber diet.  Monitor for blood in the stool. If having more than just tinge of blood, call office or go to the ER.     >>>If tissue was obtained and you have not received your pathology  results either by phone or letter within 2 weeks, please call our office at [phone].     Specimens: cecal, ascending, transverse, descending, sigmoid and rectum biopsies            Electronically signed by Marcie Acevedo MD at 12/15/2021  8:13 AM    Final Diagnosis:      A. Cecum; biopsy:  Colonic mucosa with no significant pathologic changes.  No granulomas, active colitis, dysplasia or changes associated with chronicity identified.     B. Ascending colon; biopsy:  Colonic mucosa with no significant pathologic changes.  No granulomas, active colitis, dysplasia or changes associated with chronicity identified.     C. Transverse colon; biopsy:  Colonic mucosa with no significant pathologic changes.  No granulomas, active colitis, dysplasia or changes associated with chronicity identified.     D. Descending colon; biopsy:  Colonic mucosa with no significant pathologic changes.  No granulomas, active colitis, dysplasia or changes associated with chronicity identified.     E. Sigmoid colon; biopsy:  Colonic mucosa with mild chronic colitis.  No granulomas, active colitis, dysplasia or changes associated with chronicity identified.     F. Rectal colon; biopsy:  Colonic mucosa with no significant pathologic changes.  No granulomas, active colitis, dysplasia or changes associated with chronicity identified.     Comment:  The patient's history of ulcerative colitis is noted. The findings in these biopsies are consistent with the clinical impression of no active colitis.

## 2024-04-22 NOTE — TELEPHONE ENCOUNTER
Scheduled for:  Colonoscopy [address]  Provider Name:  Dr. Acevedo   Date:  9/23/24  Location:  [facility]    Sedation:  Mac  Time:  7:30 (patient is aware arrival time is 6:30)  Prep:  trilyte   Meds/Allergies Reconciled?:  Physician reviewed   Diagnosis with codes:  Ulcerative colitis K51.90  Colon cancer screening Z12.11  Was patient informed to call insurance with codes (Y/N):  Yes, I confirmed Medicare insurance with the patient.   Referral sent?:  Referral was sent at the time of electronic surgical scheduling.  EM or [facility] notified?:  I sent an electronic request to Endo Scheduling and received a confirmation today.   Medication Orders:  This patient verbally confirmed that she is not taking:   Iron, blood thinners, BP meds, and is not diabetic   Not latex allergy, Not PCN allergy and does not have a pacemaker  Misc Orders:  I discussed the prep instructions with the patient which she verbally understood and is aware that I will mychart the instructions today.       Further instructions given by staff:

## 2024-05-14 ENCOUNTER — TELEPHONE (OUTPATIENT)
Facility: CLINIC | Age: 74
End: 2024-05-14

## 2024-05-14 ENCOUNTER — TELEPHONE (OUTPATIENT)
Dept: HEMATOLOGY/ONCOLOGY | Facility: HOSPITAL | Age: 74
End: 2024-05-14

## 2024-05-14 ENCOUNTER — ORDER TRANSCRIPTION (OUTPATIENT)
Dept: ADMINISTRATIVE | Facility: HOSPITAL | Age: 74
End: 2024-05-14

## 2024-05-14 DIAGNOSIS — Z12.31 ENCOUNTER FOR SCREENING MAMMOGRAM FOR MALIGNANT NEOPLASM OF BREAST: Primary | ICD-10-CM

## 2024-05-14 NOTE — TELEPHONE ENCOUNTER
Infusion nursing: Pt had previously written us stating she was scheduled for an infusion on May 2 in [place] and would need another infusion in early July. I believe her infusions are for every 8 weeks so I think she is technically due in end of June but please verify when pt got last infusion when you call her. Let me know if anything else is needed. Thank you!

## 2024-05-14 NOTE — TELEPHONE ENCOUNTER
PPD: Can you please follow up on this? TE was originally routed on 4/22/24 but pt was also scheduled for procedure also so I think remicade infusion approval still needs to be obtained. Thank you!    Me         4/22/24  1:21 PM  Note  PPD: Can you please obtain insurance approval for newly signed remicade orders?   Medication is not new to pt but she was [place] them [place] for that last few months (pt splits her time between here and there). She used to get them at our infusion department here but they need a new order in order to schedule her again. Thank you!          RN called pt, no answer. LM informing her that insurance approval for infusion is pending, will contact her with any updates. No infusion appt yet scheduled, likely because infusion dept needs insurance approval before scheduling infusion date. GI office number provided.

## 2024-05-14 NOTE — TELEPHONE ENCOUNTER
Patient called regarding authorization for remicade infusion. See telephone encounter 4/16.  Please call.

## 2024-05-15 ENCOUNTER — TELEPHONE (OUTPATIENT)
Dept: HEMATOLOGY/ONCOLOGY | Facility: HOSPITAL | Age: 74
End: 2024-05-15

## 2024-05-17 ENCOUNTER — TELEPHONE (OUTPATIENT)
Facility: CLINIC | Age: 74
End: 2024-05-17

## 2024-05-17 NOTE — TELEPHONE ENCOUNTER
Remicade 5mg/kg every 8 weeks. Premeds with solucortef 200 mg IV.      Marcie Acevedo MD4 weeks ago       Yes thank you that was recommended at [facility] due to risk of immunogenicity         Note      You routed conversation to Marcie Acevedo MD4 weeks ago     You4 weeks ago     SM  Dr. Acevedo,   Please see pended remicade orders and sign if agreeable. It looks like pt was getting 5mg/kg but please verify. I did order solucortef 200 mg IV as premed.       Also, you mentioned reviewing labs at office appt? Please see pended labs and sign if agreeable.     Pt scheduled for clinic appt on 6/5/24. Just want to verify if the plan is still do schedule colon while at office appt.  Thanks,  Karlee

## 2024-05-30 ENCOUNTER — TELEPHONE (OUTPATIENT)
Facility: CLINIC | Age: 74
End: 2024-05-30

## 2024-05-31 ENCOUNTER — LAB ENCOUNTER (OUTPATIENT)
Dept: LAB | Age: 74
End: 2024-05-31
Attending: INTERNAL MEDICINE
Payer: MEDICARE

## 2024-05-31 DIAGNOSIS — K51.919 ULCERATIVE COLITIS WITH COMPLICATION, UNSPECIFIED LOCATION (HCC): ICD-10-CM

## 2024-05-31 LAB
ALBUMIN SERPL-MCNC: 3.8 G/DL (ref 3.4–5)
ALBUMIN/GLOB SERPL: 1 {RATIO} (ref 1–2)
ALP LIVER SERPL-CCNC: 64 U/L
ALT SERPL-CCNC: 24 U/L
ANION GAP SERPL CALC-SCNC: 2 MMOL/L (ref 0–18)
AST SERPL-CCNC: 22 U/L (ref 15–37)
BASOPHILS # BLD AUTO: 0.04 X10(3) UL (ref 0–0.2)
BASOPHILS NFR BLD AUTO: 0.8 %
BILIRUB SERPL-MCNC: 0.9 MG/DL (ref 0.1–2)
BUN BLD-MCNC: 18 MG/DL (ref 9–23)
CALCIUM BLD-MCNC: 9 MG/DL (ref 8.5–10.1)
CHLORIDE SERPL-SCNC: 111 MMOL/L (ref 98–112)
CO2 SERPL-SCNC: 31 MMOL/L (ref 21–32)
CREAT BLD-MCNC: 0.7 MG/DL
CRP SERPL-MCNC: <0.29 MG/DL (ref ?–0.3)
EGFRCR SERPLBLD CKD-EPI 2021: 92 ML/MIN/1.73M2 (ref 60–?)
EOSINOPHIL # BLD AUTO: 0.2 X10(3) UL (ref 0–0.7)
EOSINOPHIL NFR BLD AUTO: 3.9 %
ERYTHROCYTE [DISTWIDTH] IN BLOOD BY AUTOMATED COUNT: 13.2 %
ERYTHROCYTE [SEDIMENTATION RATE] IN BLOOD: 22 MM/HR
FASTING STATUS PATIENT QL REPORTED: YES
GLOBULIN PLAS-MCNC: 3.9 G/DL (ref 2.8–4.4)
GLUCOSE BLD-MCNC: 94 MG/DL (ref 70–99)
HCT VFR BLD AUTO: 37.4 %
HGB BLD-MCNC: 12.7 G/DL
IMM GRANULOCYTES # BLD AUTO: 0.01 X10(3) UL (ref 0–1)
IMM GRANULOCYTES NFR BLD: 0.2 %
LYMPHOCYTES # BLD AUTO: 1.47 X10(3) UL (ref 1–4)
LYMPHOCYTES NFR BLD AUTO: 28.9 %
MCH RBC QN AUTO: 31.7 PG (ref 26–34)
MCHC RBC AUTO-ENTMCNC: 34 G/DL (ref 31–37)
MCV RBC AUTO: 93.3 FL
MONOCYTES # BLD AUTO: 0.52 X10(3) UL (ref 0.1–1)
MONOCYTES NFR BLD AUTO: 10.2 %
NEUTROPHILS # BLD AUTO: 2.84 X10 (3) UL (ref 1.5–7.7)
NEUTROPHILS # BLD AUTO: 2.84 X10(3) UL (ref 1.5–7.7)
NEUTROPHILS NFR BLD AUTO: 56 %
OSMOLALITY SERPL CALC.SUM OF ELEC: 300 MOSM/KG (ref 275–295)
PLATELET # BLD AUTO: 242 10(3)UL (ref 150–450)
POTASSIUM SERPL-SCNC: 3.8 MMOL/L (ref 3.5–5.1)
PROT SERPL-MCNC: 7.7 G/DL (ref 6.4–8.2)
RBC # BLD AUTO: 4.01 X10(6)UL
SODIUM SERPL-SCNC: 144 MMOL/L (ref 136–145)
WBC # BLD AUTO: 5.1 X10(3) UL (ref 4–11)

## 2024-05-31 PROCEDURE — 80053 COMPREHEN METABOLIC PANEL: CPT

## 2024-05-31 PROCEDURE — 85025 COMPLETE CBC W/AUTO DIFF WBC: CPT

## 2024-05-31 PROCEDURE — 86140 C-REACTIVE PROTEIN: CPT

## 2024-05-31 PROCEDURE — 85652 RBC SED RATE AUTOMATED: CPT

## 2024-05-31 PROCEDURE — 36415 COLL VENOUS BLD VENIPUNCTURE: CPT

## 2024-06-04 ENCOUNTER — HOSPITAL ENCOUNTER (OUTPATIENT)
Dept: MAMMOGRAPHY | Age: 74
Discharge: HOME | End: 2024-06-04
Attending: INTERNAL MEDICINE
Payer: MEDICARE

## 2024-06-04 ENCOUNTER — TELEPHONE (OUTPATIENT)
Facility: CLINIC | Age: 74
End: 2024-06-04

## 2024-06-04 DIAGNOSIS — Z12.31 ENCOUNTER FOR SCREENING MAMMOGRAM FOR MALIGNANT NEOPLASM OF BREAST: ICD-10-CM

## 2024-06-04 DIAGNOSIS — K51.919 ULCERATIVE COLITIS WITH COMPLICATION, UNSPECIFIED LOCATION (HCC): Primary | ICD-10-CM

## 2024-06-04 PROCEDURE — 77063 BREAST TOMOSYNTHESIS BI: CPT | Performed by: INTERNAL MEDICINE

## 2024-06-04 PROCEDURE — 77067 SCR MAMMO BI INCL CAD: CPT | Performed by: INTERNAL MEDICINE

## 2024-06-04 NOTE — TELEPHONE ENCOUNTER
----- Message from Marcie Acevedo sent at 6/3/2024 12:47 PM CDT -----  Recall labs every 3-4 months.     Thank you.

## 2024-06-04 NOTE — TELEPHONE ENCOUNTER
Dr. Acevedo,   Please see pended standing orders and sign if agreeable.  Thanks,  Karlee MCCALL called and spoke to pt, informed her that MD reviewed labs. Labs are WNL and recommended to repeat in 3-4 months. Reminded pt that she is scheduled for a clinic appt with MD tomorrow. Pt verbalized understanding and had no further needs.      Lab recall in 3 months per MD. Message sent to pt outreach.

## 2024-06-05 ENCOUNTER — OFFICE VISIT (OUTPATIENT)
Dept: GASTROENTEROLOGY | Facility: CLINIC | Age: 74
End: 2024-06-05

## 2024-06-05 VITALS
HEIGHT: 68 IN | WEIGHT: 150 LBS | SYSTOLIC BLOOD PRESSURE: 108 MMHG | BODY MASS INDEX: 22.73 KG/M2 | DIASTOLIC BLOOD PRESSURE: 62 MMHG

## 2024-06-05 DIAGNOSIS — K51.00 ULCERATIVE PANCOLITIS WITHOUT COMPLICATION (HCC): Primary | ICD-10-CM

## 2024-06-05 DIAGNOSIS — K64.8 INTERNAL HEMORRHOIDS: ICD-10-CM

## 2024-06-05 PROCEDURE — 99214 OFFICE O/P EST MOD 30 MIN: CPT | Performed by: INTERNAL MEDICINE

## 2024-06-05 NOTE — PROGRESS NOTES
[facility] - Gastroenterology  Clinic Follow-up Visit    Chief Complaint   Patient presents with    Follow - Up     Yearly visit         Subjective/HPI:   Madelyn Machado is a 73 year old female, patient of Dr. Carlos with history of UC diagnosed in 1970, who presents for follow up      Past Visit(s):    REVIEWED History:   \"Madelyn Machado is a 68 year old year-old female with history of UC diagnosed in the 1970s.  Patient was initially started on Asacol and she had a bad flare in 2001 requiring hospitalization and treatment with IV steroids.  She subsequently increased improved and was sent home.  He had an allergy to sulfa drugs and Asacol was discontinued.  She had a long period of time from 0700-9129 where she felt completely fine.  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  She had back episode on her back and her leg.  She never really had bowel symptoms at that time.  She was started on IV steroids and Delzicol and Imuran 50 mg later increased to 100 mg daily.  In 2015 she had a colonoscopy which showed pan ulcerative colitis with pseudopolyps from the ascending colon to the sigmoid.  Her pyoderma eventually got better with topical tacrolimus and steroids and she was feeling well until 2017.  In 2017 she started having abdominal pain, hematochezia and loose stools.  At times she was treated with steroids.  She had severe constipation and then bladder portal prolapse.  She also had acne secondary to steroids.  Her pyoderma reoccurred on her back and left leg.  Is one the decision to initiate Remicade and increase prednisone was made.     In 2017 she had a surveillance colonoscopy which showed worsening patchy erythema in the left colon numerous pseudopolyps and internal hemorrhoids.     January 2018 she was referred for second opinion to [facility], Dr. Flynn.  At that time it was recommended to check a Remicade level prior to the next dose.  A timeout was discussed given risk  of monotherapy to give IV hydrocortisone 200 mg prior to each infusion to decrease the risk of immunogenicity.      She now presents if she is due for renewal for remicade. Severe arthritis over the winter and saw she has bone spurs.  Early this year cdiff last year and early this year. Has some anal leakage. Goes daily and soft/solid.  Denies any blood in the stool currently denies any abdominal pain or any pyoderma gangrenosum.     Prior endoscopies:  10/2015- quiescent UC with pseudopolyps from ascending to sigmoid      12/2017-- normal TI, UC pancolitis with patchy areas of erythema L>R. Numerous pseudopolyps, internal hemorrhoids  Pathology:  Cecum- descending: mild chronic inflammation without dysplasia  Sigmoid- moderate inflammation, frag of hyperplastic polyps, no dysplasia  Rectum: mild chronic inflammation without dysplasia     Soc: Denies smoking, alcohol, drug use\"    COLONOSCOPY  Date of procedure: 11/07/19     Procedure: Colonoscopy w/biopsies     Pre-operative diagnosis: surveillance for CRC in UC patient     Post-operative diagnosis: pseudopolyps and decreased vascularity      Medications: MAC sedation  Withdrawal time: 13 minutes     Procedure:  Informed consent was obtained from the patient after the risks of the procedure were discussed, including but not limited to bleeding, perforation, aspiration, infection, or possibility of a missed lesion. After discussions of the risks/benefits and alternatives to this procedure, as well as the planned sedation, the patient was placed in the left lateral decubitus position and begun on continuous blood pressure pulse oximetry and EKG monitoring and this was maintained throughout the procedure. Once an adequate level of sedation was obtained a digital rectal exam was completed. Then the lubricated tip of the [id number]-127 diagnostic video colonoscope was inserted and advanced without difficulty to the cecum using the CO2 insufflation technique. The  cecum was identified by localizing the trifold, the appendix and the ileocecal valve. Withdrawal was begun with thorough washing and careful examination of the colonic walls and folds. A routine second examination of the cecum/ascending colon was performed. Photodocumentation was obtained. The bowel prep was good. Views of the colon were good with washing. I then carefully withdrew the instrument from the patient who tolerated the procedure well.      Complications: none.     Findings:   1. Four quadrant biopsies were taken every 10 cm with a total of > 33 biopsies     2. Terminal ileum: the visualized mucosa appeared normal.     3. A retroflexed view of the rectum revealed small hemorrhoids.     4. The colonic mucosa throughout the colon had pseudopolyps, L>R and decreased vascularity of the left colon. No active inflammation or friability noted     5. JIGAR: normal rectal tone, no masses palpated.      Impression:   Pseudopolyps and decreased vascularity  No active colitis appreciated     Recommend:  Await pathology. The interval for the next colonoscopy will be determined after reviewing pathology. If new signs or symptoms develop, colonoscopy may need to be repeated sooner.   High fiber diet.  Monitor for blood in the stool. If having more than just tinge of blood, call office or go to the ER.     >>>If tissue was obtained and you have not received your pathology results either by phone or letter within 2 weeks, please call our office at [phone].     Specimens: cecal, ascending, transverse, descending, sigmoid and rectum biopsies     Specimens:   A) - Cecum, BX R/o dysplasia and colitis                                                             B) - Colon ascending, BX  R/o dysplasia and colitis                                                  C) - Colon transverse, BX  R/o dysplasia and colitis                                                 D) - Colon descending, BX  R/o dysplasia and colitis                                                  E) - Sigmoid colon, BX  R/o dysplasia and colitis                                                    F) - Rectum,  BX  R/o dysplasia and colitis                                                Final Diagnosis:      A. Cecum; biopsy:  Colonic mucosa without significant histopathology  No evidence of chronic or active colitis, granuloma, or dysplasia     B. Ascending colon; biopsy:  Colonic mucosa without significant histopathology  No evidence of chronic or active colitis, granuloma, or dysplasia     C. Transverse colon; biopsy:  Colonic mucosa without significant histopathology  No evidence of chronic or active colitis, granuloma, or dysplasia     D. Descending colon; biopsy:  Colonic mucosa without significant histopathology  No evidence of chronic or active colitis, granuloma, or dysplasia     E. Sigmoid colon; biopsy:  Colonic mucosa without significant histopathology  No evidence of chronic or active colitis, granuloma, or dysplasia     F. Rectum; biopsy:  Colonic mucosa without significant histopathology  No evidence of chronic or active colitis, granuloma, or dysplasia     Comment: The patient's history of ulcerative colitis is noted.     Seen in telephone visit 2020   Current concern is toe numbness unsure why this is happening  Also concern for osteoporosis/osteopenia  Also having more arthritis  Has more vision issues and going to see neurology due to ophthalmology  Per patient had pyoderma gangrenosum had it when she went to [place] and had the skin breakout  Feels like it has gone into remission.   More stress so worried about her bowels-- divorce recently, laid off, more stress    ===========================================================================  8/11/2021 pt presents for f/u.    Overall, the patient feels well. Did have some lesions on the body worried about from UC. Not typical of her pyoderma. Denies any GI symptoms of abdominal pain, nausea, vomiting, change  in bowel habits, dyspepsia, dysphagia, hematemesis, hematochezia, or melena. Patient has a bowel movement each day. Additionally there is no change of appetite, unexpected weight loss, and no reported history of chest pain or shortness of breath.      Colonoscopy 12/15/2021  Findings:   1. Four quadrant biopsies were taken every 10 cm with a total of > 36 biopsies     2. Terminal ileum: the visualized mucosa appeared normal.     3. A retroflexed view of the rectum revealed small hemorrhoids.     4. The colonic mucosa throughout the colon had pseudopolyps, L>R and decreased vascularity of the left colon. No active inflammation or friability noted     5. JIGAR: normal rectal tone, no masses palpated.      Impression:   Pseudopolyps and decreased vascularity  No active colitis appreciated     Recommend:  Await pathology. The interval for the next colonoscopy will be determined after reviewing pathology. If new signs or symptoms develop, colonoscopy may need to be repeated sooner.   High fiber diet.  Monitor for blood in the stool. If having more than just tinge of blood, call office or go to the ER.  Final Diagnosis:      A. Cecum; biopsy:  Colonic mucosa with no significant pathologic changes.  No granulomas, active colitis, dysplasia or changes associated with chronicity identified.     B. Ascending colon; biopsy:  Colonic mucosa with no significant pathologic changes.  No granulomas, active colitis, dysplasia or changes associated with chronicity identified.     C. Transverse colon; biopsy:  Colonic mucosa with no significant pathologic changes.  No granulomas, active colitis, dysplasia or changes associated with chronicity identified.     D. Descending colon; biopsy:  Colonic mucosa with no significant pathologic changes.  No granulomas, active colitis, dysplasia or changes associated with chronicity identified.     E. Sigmoid colon; biopsy:  Colonic mucosa with mild chronic colitis.  No granulomas, active colitis,  dysplasia or changes associated with chronicity identified.     F. Rectal colon; biopsy:  Colonic mucosa with no significant pathologic changes.  No granulomas, active colitis, dysplasia or changes associated with chronicity identified.     Comment:  The patient's history of ulcerative colitis is noted. The findings in these biopsies are consistent with the clinical impression of no active colitis.       =========================================================================  04/26/23 Seen in follow up in person first time since 2019    Main concern is if infusion is helping skin conditions  Did go to \[facility]\"" and had a flare when gone for 4 months and had large joint arthritis (missed an infusion Sept and then end of Jan/Feb)  Got infusion and felt better  Mid jan was progressively worse    Denies any GI symptoms of abdominal pain, nausea, vomiting, change in bowel habits, dyspepsia, dysphagia, hematemesis, hematochezia, or melena. Patient has a bowel movement each day. Additionally there is no change of appetite, unexpected weight loss, and no reported history of chest pain or shortness of breath.    06/05/24 presents for yearly follow up  Does go to [facility] and has been doing well on remicade-dyyb (inflectra) and still doing well  Here getting remicade/infliximab 10 mg/kg every 8 weeks  Retired and feeling well  Exercising more    Wt Readings from Last 10 Encounters:   06/05/24 150 lb (68 kg)   11/06/23 150 lb (68 kg)   10/30/23 151 lb (68.5 kg)   09/19/23 152 lb (68.9 kg)   09/05/23 151 lb 12.8 oz (68.9 kg)   07/20/23 155 lb (70.3 kg)   07/11/23 156 lb 6.4 oz (70.9 kg)   05/09/23 159 lb 3.2 oz (72.2 kg)   05/02/23 156 lb (70.8 kg)   04/26/23 156 lb (70.8 kg)         History, Medications, Allergies, ROS:      Past Medical History:    Anesthesia complication    Colitis    Colon polyps    Internal hemorrhoids    Lipid screening    per NG    Nasal fracture    Osteoarthritis    PONV (postoperative nausea and  vomiting)    Pyoderma    Ulcerative colitis (HCC)      Past Surgical History:   Procedure Laterality Date    Colonoscopy      Colonoscopy N/A 2017    Procedure: COLONOSCOPY;  Surgeon: Carri Aparicio MD;  Location: [facility] ENDOSCOPY    Colonoscopy N/A 2019    Procedure: COLONOSCOPY;  Surgeon: Marcie Acevedo MD;  Location: [facility] ENDOSCOPY    Colonoscopy N/A 12/15/2021    Procedure: COLONOSCOPY;  Surgeon: Marcie Acevedo MD;  Location: [facility] ENDO    Excisional biospy right  1967    Hysterectomy  2017    TVH/BSO/vag vault suspension / post repair    Fabby localization wire 1 site right (cpt=19281)      benign tumor removed       Family History   Problem Relation Age of Onset    Diabetes Father     Pacemaker Father     Heart Disease Father     Melanoma Father     Diabetes Mother         DM    Cancer Mother         leukemia    Cancer Maternal Grandmother         lung    Cancer Paternal Grandmother         stomach    Cancer Sister         breast    Breast Cancer Sister 58        age 58    Heart Disorder Neg         Premature CAD, family h/o    Colon Cancer Neg         Family h/o      Social History:   Social History     Socioeconomic History    Marital status:    Tobacco Use    Smoking status: Former     Current packs/day: 0.00     Types: Cigarettes     Quit date: 2004     Years since quittin.8    Smokeless tobacco: Former   Vaping Use    Vaping status: Never Used   Substance and Sexual Activity    Alcohol use: Yes     Alcohol/week: 0.0 standard drinks of alcohol     Comment: very minimally- one drink per weekend; socially    Drug use: No    Sexual activity: Not Currently   Other Topics Concern    Caffeine Concern Yes     Comment: coffee, 2 cups daily    Pt has a pacemaker No    Pt has a defibrillator No    Reaction to local anesthetic No     Social Determinants of Health      [facility]    Social Connections     Received from [facility]    Housing Stability        Medications (Active prior to today's visit):  Current Outpatient Medications   Medication Sig Dispense Refill    InFLIXimab (REMICADE IV) Inject into the vein.      PEG 3350-KCl-Na Bicarb-NaCl (TRILYTE) 420 g Oral Recon Soln Take 4,000 mL by mouth As Directed. 4000 mL 0    Ascorbic Acid (VITAMIN C) 1000 MG Oral Tab Take 1 tablet (1,000 mg total) by mouth daily. (Patient not taking: Reported on 4/22/2024)      B Complex-C-Folic Acid Oral Tab Take by mouth. (Patient not taking: Reported on 4/22/2024)      Multiple Vitamins-Minerals (CENTRUM SILVER ADULT 50+) Oral Tab Take 1 tablet by mouth daily. (Patient not taking: Reported on 4/22/2024)      cholecalciferol (VITAMIN D HIGH POTENCY) 25 MCG (1000 UT) Oral Cap Take 1 capsule (1,000 Units total) by mouth daily. (Patient not taking: Reported on 4/22/2024) 30 capsule 0    triamcinolone 0.5 % External Cream Apply 1 Application topically 2 (two) times daily. (Patient not taking: Reported on 6/5/2024) 30 g 1    Biotin 5000 MCG Sublingual SL Tab Place 1 capsule under the tongue daily. (Patient not taking: Reported on 4/22/2024) 90 tablet 4       Allergies:  Allergies   Allergen Reactions    Sulfa Antibiotics PAIN       ROS:   CONSTITUTIONAL:  negative for fevers, chills  EYES:  negative for change in vision  RESPIRATORY:  negative for  shortness of breath  CARDIOVASCULAR:  negative for  chest pain  GASTROINTESTINAL:  see HPI  GENITOURINARY:  negative for dysuria and hematuria   SKIN:  negative for  rash  ALLERGIC/IMMUNOLOGIC:  negative for hay fever allergies  ENDOCRINE:  negative for cold intolerance and heat intolerance  MUSCULOSKELETAL:  negative for  joint stiffness and joint swelling  BEHAVIOR/PSYCH:  negative for depressed mood    PHYSICAL EXAM:   Blood pressure 108/62, height 5' 8\" (1.727 m), weight 150 lb (68 kg), not currently breastfeeding.    Gen: patient appears comfortable and in no acute  distress  HEENT: conjunctiva pink, the sclera appears anicteric, oropharynx clear, mucus membranes appear moist  CV: regular rate and rhythm, the extremities are warm and well perfused   Lung: effort normal and breath sounds normal, no respiratory distress, wheezes or rales  GI: soft, non-tender exam in all quadrants without rigidity or guarding, non-distended, no abnormal bowel sounds noted, no masses are palpated  : no CVA tenderness  Skin: dry, warm, no jaundice  Ext: no cyanosis, clubbing or edema is evident.   Neuro: Alert and oriented x4, and patient is having movements of all 4 extremities   Psych: Pt has a normal mood and affect, behavior is normal    Nursing note and vitals reviewed      Labs/Imaging:     Patient's labs and imaging were reviewed and discussed with patient today.  See HPI and A&P for further details.     .  ASSESSMENT/PLAN:   Madelyn Machado is a 73 year old  Female year old year-old female with history of pan ulcerative colitis complicated with history of iritis as well as pyoderma gangrenosum on Remicade and IV hydrocortisone.        1. History of Clostridium difficile colitis  2. UC and history of pyoderma and now with joint pain as well       Recommend:  - ok continuing the Remicade ok to do inflectra since doing well on it  - colitis under control  - Would continue at this time  - discussed options like Humira and working with doc in [place] if going to be there 4-5 months of the year  - Labs ordered for recall 3 months  - scheduled for colonoscopy 9/2024      Discussed annual screenings   -Influenza  Annual Exams:   -Breast   -Blood pressure   -Ophtho-- exam   -Skin cancer-- yearly  Radiology:   -DXA scan-- osteoporosis/osteopenia   -Mammogram  Colonoscopy:   -Postoperative (within 6-12 mo)   -Dysplasia surveillance    Labs:   -CBC   -LFTs   -Creatinine   -B12/folate/iron   -Vitamin D (25 OH vitamin D)   -Lipids/glucose    Orders This Visit:  No orders of the defined types were  placed in this encounter.      Meds This Visit:  Requested Prescriptions      No prescriptions requested or ordered in this encounter       Imaging & Referrals:  None         Marcie Acevedo MD

## 2024-06-05 NOTE — PATIENT INSTRUCTIONS
1. History of Clostridium difficile colitis  2. UC and history of pyoderma and now with joint pain as well    Recommend:  - ok continuing the Remicade ok to do inflectra since doing well on it  - colitis under control  - Would continue at this time  - discussed options like Humira and working with doc in [place] if going to be there 4-5 months of the year  - Labs ordered for recall 3 months      Discussed annual screenings   -Influenza  Annual Exams:   -Breast   -Blood pressure   -Ophtho-- exam   -Skin cancer-- yearly  Radiology:   -DXA scan-- osteoporosis/osteopenia   -Mammogram  Colonoscopy:   -Postoperative (within 6-12 mo)   -Dysplasia surveillance    Labs:   -CBC   -LFTs   -Creatinine   -B12/folate/iron   -Vitamin D (25 OH vitamin D)   -Lipids/glucose

## 2024-06-10 ENCOUNTER — OFFICE VISIT (OUTPATIENT)
Dept: INTERNAL MEDICINE CLINIC | Facility: CLINIC | Age: 74
End: 2024-06-10
Payer: MEDICARE

## 2024-06-10 ENCOUNTER — LAB ENCOUNTER (OUTPATIENT)
Dept: LAB | Age: 74
End: 2024-06-10
Attending: INTERNAL MEDICINE
Payer: MEDICARE

## 2024-06-10 VITALS
HEIGHT: 68 IN | OXYGEN SATURATION: 95 % | WEIGHT: 150.38 LBS | HEART RATE: 73 BPM | DIASTOLIC BLOOD PRESSURE: 58 MMHG | BODY MASS INDEX: 22.79 KG/M2 | SYSTOLIC BLOOD PRESSURE: 96 MMHG

## 2024-06-10 DIAGNOSIS — R73.03 PREDIABETES: ICD-10-CM

## 2024-06-10 DIAGNOSIS — Z00.00 ENCOUNTER FOR ANNUAL WELLNESS VISIT (AWV) IN MEDICARE PATIENT: ICD-10-CM

## 2024-06-10 DIAGNOSIS — M25.562 PAIN AND SWELLING OF LEFT KNEE: ICD-10-CM

## 2024-06-10 DIAGNOSIS — M85.80 OSTEOPENIA, UNSPECIFIED LOCATION: ICD-10-CM

## 2024-06-10 DIAGNOSIS — M25.462 PAIN AND SWELLING OF LEFT KNEE: ICD-10-CM

## 2024-06-10 DIAGNOSIS — K51.00 ULCERATIVE PANCOLITIS WITHOUT COMPLICATION (HCC): ICD-10-CM

## 2024-06-10 DIAGNOSIS — E55.9 VITAMIN D DEFICIENCY: ICD-10-CM

## 2024-06-10 DIAGNOSIS — N81.89 PELVIC FLOOR WEAKNESS IN FEMALE: ICD-10-CM

## 2024-06-10 DIAGNOSIS — Z12.83 SKIN CANCER SCREENING: Primary | ICD-10-CM

## 2024-06-10 DIAGNOSIS — Z78.0 POSTMENOPAUSAL: ICD-10-CM

## 2024-06-10 PROBLEM — M17.11 OSTEOARTHRITIS OF RIGHT KNEE: Status: ACTIVE | Noted: 2019-04-10

## 2024-06-10 PROBLEM — M62.830 SPASM OF MUSCLE OF LOWER BACK: Status: ACTIVE | Noted: 2020-08-11

## 2024-06-10 PROBLEM — M99.03 SEGMENTAL DYSFUNCTION OF LUMBAR REGION: Status: ACTIVE | Noted: 2019-04-12

## 2024-06-10 PROBLEM — M54.17 LUMBOSACRAL RADICULITIS: Status: ACTIVE | Noted: 2019-04-12

## 2024-06-10 LAB
DEPRECATED HBV CORE AB SER IA-ACNC: 61.6 NG/ML
EST. AVERAGE GLUCOSE BLD GHB EST-MCNC: 117 MG/DL (ref 68–126)
FOLATE SERPL-MCNC: 18.1 NG/ML (ref 5.4–?)
HBA1C MFR BLD: 5.7 % (ref ?–5.7)
IRON SATN MFR SERPL: 13 %
IRON SERPL-MCNC: 43 UG/DL
TIBC SERPL-MCNC: 332 UG/DL (ref 250–425)
TRANSFERRIN SERPL-MCNC: 223 MG/DL (ref 250–380)
TSI SER-ACNC: 3.4 MIU/ML (ref 0.55–4.78)
VIT B12 SERPL-MCNC: 337 PG/ML (ref 211–911)
VIT D+METAB SERPL-MCNC: 28 NG/ML (ref 30–100)

## 2024-06-10 PROCEDURE — 84443 ASSAY THYROID STIM HORMONE: CPT

## 2024-06-10 PROCEDURE — 82607 VITAMIN B-12: CPT

## 2024-06-10 PROCEDURE — 82728 ASSAY OF FERRITIN: CPT

## 2024-06-10 PROCEDURE — 82746 ASSAY OF FOLIC ACID SERUM: CPT

## 2024-06-10 PROCEDURE — 84466 ASSAY OF TRANSFERRIN: CPT

## 2024-06-10 PROCEDURE — 83036 HEMOGLOBIN GLYCOSYLATED A1C: CPT

## 2024-06-10 PROCEDURE — 36415 COLL VENOUS BLD VENIPUNCTURE: CPT

## 2024-06-10 PROCEDURE — 83540 ASSAY OF IRON: CPT

## 2024-06-10 PROCEDURE — 82306 VITAMIN D 25 HYDROXY: CPT

## 2024-06-10 NOTE — PROGRESS NOTES
Subjective:   Madelyn Machado is a 73 year old female who presents for a Medicare Subsequent Annual Wellness visit (Pt already had Initial Annual Wellness) and scheduled follow up of multiple significant but stable problems.       History/Other:   Fall Risk Assessment:   She has been screened for Falls and is High Risk. Fall Prevention information provided to patient in After Visit Summary.    Do you feel unsteady when standing or walking?: No  Do you worry about falling?: Yes  Have you fallen in the past year?: No     Cognitive Assessment:   She had a completely normal cognitive assessment - see flowsheet entries     Functional Ability/Status:   Madelyn Machado has some abnormal functions as listed below:  She has Walking problems based on screening of functional status.       Depression Screening (PHQ-2/PHQ-9): PHQ-2 SCORE: 0  , done 6/10/2024   If you checked off any problems, how difficult have these problems made it for you to do your work, take care of things at home, or get along with other people?: Not difficult at all             Advanced Directives:   She does NOT have a Living Will. [Do you have a living will?: No]  She does NOT have a Power of  for Health Care. [Do you have a healthcare power of ?: No]  Discussed Advance Care Planning with patient (and family/surrogate if present). Standard forms made available to patient in After Visit Summary.      Patient Active Problem List   Diagnosis    Ulcerative colitis (HCC)    Pyoderma gangrenosum (HCC)    Hair loss    Keloid scar    Medicare annual wellness visit, subsequent    Stress incontinence    History of hysterectomy    Internal hemorrhoids    Immunosuppressed status (HCC)    Scar condition and fibrosis of skin    Neuropathy    Vitamin B12 deficiency    Vitamin D deficiency    Hypercholesterolemia    Lung nodules    Prediabetes    Counseling regarding advanced directives    Osteopenia    Acute pain of left knee    Lumbosacral  10/20/23 0538    nitroGLYCERIN (NITROSTAT) SL tablet 0.4 mg, 0.4 mg, SubLINGual, Q5 Min PRN, Susi Montgomery, MATTHEW Alaniz CNP  Prior to Admission medications    Medication Sig Start Date End Date Taking? Authorizing Provider   meloxicam (MOBIC) 7.5 MG tablet Take 1 tablet by mouth at bedtime 12/26/19   Pérez Jo MD   rosuvastatin (CRESTOR) 20 MG tablet Take 1 tablet by mouth at bedtime 7/10/23   Provider, MD Yuli Ortez 62.5-25 MCG/ACT inhaler Inhale 1 puff into the lungs daily 7/13/23   Pérez Jo MD     Additional information:       VITAL SIGNS   Vitals:    10/20/23 0520   BP: 120/87   Pulse: 65   Resp: 23   Temp:    SpO2:        PHYSICAL:   General: No acute distress  HEENT:  Unremarkable for age  Neck: without increased JVD, carotid pulses 2+ bilaterally without bruits  Heart: RRR, S1 & S2 WNL, S4 gallop, without murmurs or rubs    Lungs: Clear to auscultation    Abdomen: BS present, without HSM, masses, or tenderness    Extremities: without C,C,E.  Pulses 2+ bilaterally  Mental Status: Alert & Oriented        PLANNED PROCEDURE   [x]Cath  []PCI                []Pacemaker/AICD  []BALTA             []Cardioversion []Peripheral angiography/PTA  []Other:      SEDATION  Planned agent:[x]Midazolam []Meperidine [x]Sublimaze []Morphine  []Diazepam  []Other:     ASA Classification:  []1 [x]2 []3 []4 []5  Class 1: A normal healthy patient  Class 2: Pt with mild to moderate systemic disease  Class 3: Severe systemic disease or disturbance  Class 4: Severe systemic disorders that are already life threatening. Class 5: Moribund pt with little chances of survival, for more than 24 hours. Mallampati I Airway Classification:   []1 [x]2 []3 []4    [x]Pre-procedure diagnostic studies complete and results available. Comment:    [x]Previous sedation/anesthesia experiences assessed.    Comment:    [x]The patient is an appropriate candidate to undergo the planned procedure sedation and radiculitis    Open wound of lip    Osteoarthritis of right knee    Segmental dysfunction of lumbar region    Senile hyperkeratosis    Spasm of muscle of lower back     Allergies:  She is allergic to sulfa antibiotics.    Current Medications:  Outpatient Medications Marked as Taking for the 6/10/24 encounter (Office Visit) with Rayna Kan MD   Medication Sig    InFLIXimab (REMICADE IV) Inject into the vein.       Medical History:  She  has a past medical history of Anesthesia complication, Colitis, Colon polyps, Internal hemorrhoids (2017), Lipid screening (3/16/2013), Nasal fracture, Osteoarthritis, PONV (postoperative nausea and vomiting), Pyoderma, and Ulcerative colitis (HCC) (, ).  Surgical History:  She  has a past surgical history that includes colonoscopy (); hysterectomy (2017); colonoscopy (N/A, 2017); colonoscopy (N/A, 2019); excisional biospy right (); sandra localization wire 1 site right (cpt=19281); and colonoscopy (N/A, 12/15/2021).   Family History:  Her family history includes Breast Cancer (age of onset: 58) in her sister; Cancer in her maternal grandmother, mother, paternal grandmother, and sister; Diabetes in her father and mother; Heart Disease in her father; Melanoma in her father; Pacemaker in her father.  Social History:  She  reports that she quit smoking about 19 years ago. Her smoking use included cigarettes. She has quit using smokeless tobacco. She reports current alcohol use. She reports that she does not use drugs.    Tobacco:  She smoked tobacco in the past but quit greater than 12 months ago.  Social History     Tobacco Use   Smoking Status Former    Current packs/day: 0.00    Types: Cigarettes    Quit date: 2004    Years since quittin.9   Smokeless Tobacco Former        CAGE Alcohol Screen:   CAGE screening score of 0 on 2024, showing low risk of alcohol abuse.      Patient Care Team:  Rayna Kan MD as PCP -  General  Michael Cooley MD as Consulting Physician (NEUROLOGY)    Review of Systems  GENERAL: feels well otherwise  SKIN: denies any unusual skin lesions  EYES: denies blurred vision or double vision  HEENT: denies nasal congestion, sinus pain or ST  LUNGS: denies shortness of breath with exertion  CARDIOVASCULAR: denies chest pain on exertion  GI: denies abdominal pain, denies heartburn  : denies dysuria, vaginal discharge or itching, no complaint of urinary incontinence   MUSCULOSKELETAL: denies back pain  NEURO: denies headaches  PSYCHE: denies depression or anxiety  HEMATOLOGIC: denies hx of anemia  ENDOCRINE: denies thyroid history  ALL/ASTHMA: denies hx of allergy or asthma    Objective:   Physical Exam  General Appearance:  Alert, cooperative, no distress, appears stated age   Head:  Normocephalic, without obvious abnormality, atraumatic   Eyes:  PERRL, conjunctiva/corneas clear, EOM's intact both eyes   Ears:  Normal TM's and external ear canals, both ears   Nose: Nares normal, septum midline,mucosa normal, no drainage or sinus tenderness   Throat: Lips, mucosa, and tongue normal; teeth and gums normal   Neck: Supple, symmetrical, trachea midline, no adenopathy;  thyroid: not enlarged, symmetric, no tenderness/mass/nodules; no carotid bruit or JVD   Back:   Symmetric, no curvature, ROM normal, no CVA tenderness   Lungs:   Clear to auscultation bilaterally, respirations unlabored   Heart:  Regular rate and rhythm, S1 and S2 normal, no murmur, rub, or gallop   Abdomen:   Soft, non-tender, bowel sounds active all four quadrants,  no masses, no organomegaly   Pelvic: Deferred   Extremities: Extremities normal, atraumatic, no cyanosis or edema   Pulses: 2+ and symmetric   Skin: Skin color, texture, turgor normal, no rashes or lesions   Lymph nodes: Cervical, supraclavicular, and axillary nodes normal   Neurologic: Normal       BP 96/58   Pulse 73   Ht 5' 8\" (1.727 m)   Wt 150 lb 6.4 oz (68.2 kg)    SpO2 95%   BMI 22.87 kg/m²  Estimated body mass index is 22.87 kg/m² as calculated from the following:    Height as of this encounter: 5' 8\" (1.727 m).    Weight as of this encounter: 150 lb 6.4 oz (68.2 kg).    Medicare Hearing Assessment:   Hearing Screening    Screening Method: Questionnaire  I have a problem hearing over the telephone: No I have trouble following the conversations when two or more people are talking at the same time: No   I have trouble understanding things on the TV: No I have to strain to understand conversations: No   I have to worry about missing the telephone ring or doorbell: No I have trouble hearing conversations in a noisy background such as a crowded room or restaurant: No   I get confused about where sounds come from: No I misunderstand some words in a sentence and need to ask people to repeat themselves: No   I especially have trouble understanding the speech of women and children: No I have trouble understanding the speaker in a large room such as at a meeting or place of [employer]: No   Many people I talk to seem to mumble (or don't speak clearly): No People get annoyed because I misunderstand what they say: No   I misunderstand what others are saying and make inappropriate responses: No I avoid social activities because I cannot hear well and fear I will reply improperly: No   Family members and friends have told me they think I may have hearing loss: No             Visual Acuity:   Right Eye Visual Acuity: Corrected Right Eye Chart Acuity: 20/30   Left Eye Visual Acuity: Corrected Left Eye Chart Acuity: 20/20   Both Eyes Visual Acuity: Corrected Both Eyes Chart Acuity: 20/20   Able To Tolerate Visual Acuity: Yes        Assessment & Plan:   Madelyn Machado is a 73 year old female who presents for a Medicare Assessment.     1. Skin cancer screening (Primary)  -     Derm Referral - In Network  2. Encounter for annual wellness visit (AWV) in Medicare patient  3. Ulcerative pancolitis  without complication (HCC)  -     TSH W Reflex To Free T4; Future; Expected date: 06/10/2024  -     Vitamin D; Future; Expected date: 06/10/2024  -     Vitamin B12; Future; Expected date: 06/10/2024  -     Iron And Tibc; Future; Expected date: 06/10/2024  -     Ferritin; Future; Expected date: 06/10/2024  -     Folic Acid Serum (Folate); Future; Expected date: 06/10/2024  4. Prediabetes  -     Hemoglobin A1C; Future; Expected date: 06/10/2024  -     TSH W Reflex To Free T4; Future; Expected date: 06/10/2024  -     Vitamin D; Future; Expected date: 06/10/2024  -     Vitamin B12; Future; Expected date: 06/10/2024  -     Iron And Tibc; Future; Expected date: 06/10/2024  -     Ferritin; Future; Expected date: 06/10/2024  -     Folic Acid Serum (Folate); Future; Expected date: 06/10/2024  5. Vitamin D deficiency  -     Hemoglobin A1C; Future; Expected date: 06/10/2024  -     TSH W Reflex To Free T4; Future; Expected date: 06/10/2024  -     Vitamin D; Future; Expected date: 06/10/2024  -     Vitamin B12; Future; Expected date: 06/10/2024  -     Iron And Tibc; Future; Expected date: 06/10/2024  -     Ferritin; Future; Expected date: 06/10/2024  -     Folic Acid Serum (Folate); Future; Expected date: 06/10/2024  6. Osteopenia, unspecified location  Overview:  DEXA 8/1/2022:  T score -2.0 in the femoral neck.  Orders:  -     Hemoglobin A1C; Future; Expected date: 06/10/2024  -     TSH W Reflex To Free T4; Future; Expected date: 06/10/2024  -     Vitamin D; Future; Expected date: 06/10/2024  -     Vitamin B12; Future; Expected date: 06/10/2024  -     Iron And Tibc; Future; Expected date: 06/10/2024  -     Ferritin; Future; Expected date: 06/10/2024  -     Folic Acid Serum (Folate); Future; Expected date: 06/10/2024  -     XR DEXA BONE DENSITOMETRY (CPT=77080); Future; Expected date: 06/10/2024  7. Postmenopausal  -     XR DEXA BONE DENSITOMETRY (CPT=77080); Future; Expected date: 06/10/2024  8. Pelvic floor weakness in  female  -     Physical Therapy Referral - [facility]  9. Pain and swelling of left knee  -     XR KNEE (3 VIEWS), LEFT (CPT=73562); Future; Expected date: 06/10/2024    The patient indicates understanding of these issues and agrees to the plan.  Reinforced healthy diet, lifestyle, and exercise.      No follow-ups on file.     Rayna Kan MD, 6/10/2024     Supplementary Documentation:   General Health:  In the past six months, have you lost more than 10 pounds without trying?: 2 - No  Has your appetite been poor?: No  Type of Diet: Balanced  How does the patient maintain a good energy level?: Appropriate Exercise  How would you describe your daily physical activity?: Moderate  How would you describe your current health state?: Fair  How do you maintain positive mental well-being?: Social Interaction;Games;Visiting Family  On a scale of 0 to 10, with 0 being no pain and 10 being severe pain, what is your pain level?: 4 - (Moderate)  In the past six months, have you experienced urine leakage?: 1-Yes  At any time do you feel concerned for the safety/well-being of yourself and/or your children, in your home or elsewhere?: No  Have you had any immunizations at another office such as Influenza, Hepatitis B, Tetanus, or Pneumococcal?: No       Madelyn Machado's SCREENING SCHEDULE   Tests on this list are recommended by your physician but may not be covered, or covered at this frequency, by your insurer.   Please check with your insurance carrier before scheduling to verify coverage.   PREVENTATIVE SERVICES FREQUENCY &  COVERAGE DETAILS LAST COMPLETION DATE   Diabetes Screening    Fasting Blood Sugar /  Glucose    One screening every 12 months if never tested or if previously tested but not diagnosed with pre-diabetes   One screening every 6 months if diagnosed with pre-diabetes Lab Results   Component Value Date    GLU 94 05/31/2024        Cardiovascular Disease Screening    Lipid  Panel  Cholesterol  Lipoprotein (HDL)  Triglycerides Covered every 5 years for all Medicare beneficiaries without apparent signs or symptoms of cardiovascular disease Lab Results   Component Value Date    CHOLEST 163 03/03/2023    HDL 78 (H) 03/03/2023    LDL 75 03/03/2023    LDLD 156 01/05/2022    TRIG 47 03/03/2023         Electrocardiogram (EKG)   Covered if needed at Welcome to Medicare, and non-screening if indicated for medical reasons 11/11/2019      Ultrasound Screening for Abdominal Aortic Aneurysm (AAA) Covered once in a lifetime for one of the following risk factors    Men who are 65-75 years old and have ever smoked    Anyone with a family history -     Colorectal Cancer Screening  Covered for ages 50-85; only need ONE of the following:    Colonoscopy   Covered every 10 years    Covered every 2 years if patient is at high risk or previous colonoscopy was abnormal 12/15/2021    Health Maintenance   Topic Date Due    Colorectal Cancer Screening  12/15/2023       Flexible Sigmoidoscopy   Covered every 4 years -    Fecal Occult Blood Test Covered annually -   Bone Density Screening    Bone density screening    Covered every 2 years after age 65 if diagnosed with risk of osteoporosis or estrogen deficiency.    Covered yearly for long-term glucocorticoid medication use (Steroids) Last Dexa Scan:    XR DEXA BONE DENSITOMETRY (CPT=77080) 08/01/2022      No recommendations at this time   Pap and Pelvic    Pap   Covered every 2 years for women at normal risk; Annually if at high risk -  No recommendations at this time    Chlamydia Annually if high risk -  No recommendations at this time   Screening Mammogram    Mammogram     Recommend annually for all female patients aged 40 and older    One baseline mammogram covered for patients aged 35-39 06/04/2024    Health Maintenance   Topic Date Due    Mammogram  06/04/2025       Immunizations    Influenza Covered once per flu season  Please get every year 09/19/2023  No  recommendations at this time    Pneumococcal Each vaccine ([id number] & [id number]) covered once after 65 Prevnar 13: 11/15/2017    [id number]: 07/14/2020     No recommendations at this time    Hepatitis B One screening covered for patients with certain risk factors   -  No recommendations at this time    Tetanus Toxoid Not covered by Medicare Part B unless medically necessary (cut with metal); may be covered with your pharmacy prescription benefits 04/26/2004    Tetanus, Diptheria and Pertusis TD and TDaP Not covered by Medicare Part B -  No recommendations at this time    Zoster Not covered by Medicare Part B; may be covered with your pharmacy  prescription benefits -  No recommendations at this time

## 2024-06-11 RX ORDER — FERROUS SULFATE 324(65)MG
1 TABLET, DELAYED RELEASE (ENTERIC COATED) ORAL
Qty: 18 TABLET | Refills: 0 | Status: SHIPPED | OUTPATIENT
Start: 2024-06-12 | End: 2024-07-27

## 2024-06-11 RX ORDER — ERGOCALCIFEROL 1.25 MG/1
50000 CAPSULE ORAL WEEKLY
Qty: 12 CAPSULE | Refills: 0 | Status: SHIPPED | OUTPATIENT
Start: 2024-06-11 | End: 2024-08-28

## 2024-06-21 ENCOUNTER — HOSPITAL ENCOUNTER (OUTPATIENT)
Dept: BONE DENSITY | Facility: HOSPITAL | Age: 74
Discharge: HOME | End: 2024-06-21
Attending: INTERNAL MEDICINE

## 2024-06-21 ENCOUNTER — HOSPITAL ENCOUNTER (OUTPATIENT)
Dept: GENERAL RADIOLOGY | Facility: HOSPITAL | Age: 74
Discharge: HOME | End: 2024-06-21
Attending: INTERNAL MEDICINE

## 2024-06-21 DIAGNOSIS — Z78.0 POSTMENOPAUSAL: ICD-10-CM

## 2024-06-21 DIAGNOSIS — M25.462 PAIN AND SWELLING OF LEFT KNEE: ICD-10-CM

## 2024-06-21 DIAGNOSIS — M85.80 OSTEOPENIA, UNSPECIFIED LOCATION: ICD-10-CM

## 2024-06-21 DIAGNOSIS — M25.562 PAIN AND SWELLING OF LEFT KNEE: ICD-10-CM

## 2024-06-21 PROCEDURE — 77080 DXA BONE DENSITY AXIAL: CPT | Performed by: INTERNAL MEDICINE

## 2024-06-21 PROCEDURE — 73562 X-RAY EXAM OF KNEE 3: CPT | Performed by: INTERNAL MEDICINE

## 2024-06-24 DIAGNOSIS — M17.12 ARTHRITIS OF LEFT KNEE: Primary | ICD-10-CM

## 2024-06-24 DIAGNOSIS — M85.80 OSTEOPENIA, UNSPECIFIED LOCATION: Primary | ICD-10-CM

## 2024-06-25 ENCOUNTER — PATIENT MESSAGE (OUTPATIENT)
Dept: GASTROENTEROLOGY | Facility: CLINIC | Age: 74
End: 2024-06-25

## 2024-06-26 ENCOUNTER — TELEPHONE (OUTPATIENT)
Facility: CLINIC | Age: 74
End: 2024-06-26

## 2024-06-26 NOTE — TELEPHONE ENCOUNTER
Patient called regarding this message.  Patient is scheduled for tomorrow for infusion.  Please call.

## 2024-06-26 NOTE — TELEPHONE ENCOUNTER
Dr. Acevedo,   Pt is scheduled for infliximab infusion tomorrow and had some questions.     1) She is taking larger doses of Vit D and iron and is worried this may cause an issue with her infusion. I told her I don't think it would be an issue but defer official answer to you.    2) She got infliximab dyyb in [place]. She is wondering if the discomfort and cramping in her feet/toes is r/t getting inflectra there. Her order is for infliximab tomorrow; is there any issues rotating between inflectra and infliximab?  Please advise.  Thanks,  Karlee Machado   to P  Gi Clinical Staff (supporting Marcie Acevedo MD)         6/25/24 10:10 PM  Hi Dr Acevedo,  I have an infusion scheduled for this Thursday afternoon. I just want to make sure that there isn’t any problem taking the 2 vitamins (D2 1.25MG 50,000 unit & ferrous sulfate EC 324mg) recently prescribed by my PCP, Dr Kan since they are larger than normal dosages & I started them last week.  At my last visit with you, I failed to mention that the neuropathy in my toes/feet seems to have become more intense in discomfort with frequent cramping just in the last few months. Could the change in my infliximab to the infliximab dyyb that was administered in [place] (last 3 treatments) have any bearing on this? I’m checking because I think I may be getting the infliximab dyyb on Thursday since I had told you that I felt there was no apparent change when you had asked me at my visit with you. Please advise.  Thank you.

## 2024-06-26 NOTE — TELEPHONE ENCOUNTER
Patient calling to speak with Dr. Acevedo or Dr. Acevedo's nurse regarding message. Please call.

## 2024-06-27 ENCOUNTER — OFFICE VISIT (OUTPATIENT)
Dept: HEMATOLOGY/ONCOLOGY | Facility: HOSPITAL | Age: 74
End: 2024-06-27
Attending: INTERNAL MEDICINE

## 2024-06-27 VITALS
DIASTOLIC BLOOD PRESSURE: 62 MMHG | WEIGHT: 149.19 LBS | BODY MASS INDEX: 23 KG/M2 | RESPIRATION RATE: 16 BRPM | SYSTOLIC BLOOD PRESSURE: 110 MMHG | HEART RATE: 57 BPM | OXYGEN SATURATION: 96 % | TEMPERATURE: 98 F

## 2024-06-27 DIAGNOSIS — K51.00 ULCERATIVE PANCOLITIS WITHOUT COMPLICATION (HCC): Primary | ICD-10-CM

## 2024-06-27 PROCEDURE — 96413 CHEMO IV INFUSION 1 HR: CPT

## 2024-06-27 PROCEDURE — 96375 TX/PRO/DX INJ NEW DRUG ADDON: CPT

## 2024-06-27 PROCEDURE — 96415 CHEMO IV INFUSION ADDL HR: CPT

## 2024-06-27 RX ORDER — DIPHENHYDRAMINE HCL 25 MG
CAPSULE ORAL
Status: COMPLETED
Start: 2024-06-27 | End: 2024-06-27

## 2024-06-27 RX ORDER — DIPHENHYDRAMINE HYDROCHLORIDE 50 MG/ML
25 INJECTION INTRAMUSCULAR; INTRAVENOUS ONCE
OUTPATIENT
Start: 2024-08-22

## 2024-06-27 RX ORDER — ACETAMINOPHEN 325 MG/1
650 TABLET ORAL ONCE
Status: COMPLETED | OUTPATIENT
Start: 2024-06-27 | End: 2024-06-27

## 2024-06-27 RX ORDER — ACETAMINOPHEN 325 MG/1
650 TABLET ORAL EVERY 6 HOURS PRN
OUTPATIENT
Start: 2024-08-22

## 2024-06-27 RX ORDER — DIPHENHYDRAMINE HCL 25 MG
25 CAPSULE ORAL ONCE
OUTPATIENT
Start: 2024-08-22

## 2024-06-27 RX ORDER — ACETAMINOPHEN 325 MG/1
TABLET ORAL
Status: COMPLETED
Start: 2024-06-27 | End: 2024-06-27

## 2024-06-27 RX ORDER — DIPHENHYDRAMINE HCL 25 MG
25 CAPSULE ORAL ONCE
Status: COMPLETED | OUTPATIENT
Start: 2024-06-27 | End: 2024-06-27

## 2024-06-27 RX ORDER — ACETAMINOPHEN 325 MG/1
650 TABLET ORAL ONCE
OUTPATIENT
Start: 2024-08-22

## 2024-06-27 RX ADMIN — DIPHENHYDRAMINE HCL 25 MG: 25 MG CAPSULE ORAL at 13:48:00

## 2024-06-27 RX ADMIN — ACETAMINOPHEN 650 MG: 325 TABLET ORAL at 13:48:00

## 2024-06-27 NOTE — TELEPHONE ENCOUNTER
No issues to with vit D and iron    No issues switching between the two formulation-- inflectra and infliximab      Marcie Acevedo MD

## 2024-06-27 NOTE — PROGRESS NOTES
Pt here for Remicade . Pt denies any issues or concerns.  Her last couple doses were given in [place]. Premeds given as ordered.  Filter present on IV line/      Ordering Provider: Dr Acevedo  Order Exp: 4/21/25     Pt tolerated infusion without difficulty or complaint. Reviewed next apt date/time: Yes      Education Record  Learner:  Patient  Disease / Diagnosis: Ulcerative Pancolitis  Barriers / Limitations:  None  Method:  Discussion  General Topics:  Plan of care reviewed  Outcome:  Shows understanding

## 2024-06-27 NOTE — TELEPHONE ENCOUNTER
RN called pt, no answer so left detailed message regarding MD message below. Instructed pt to call GI office for any further needs, GI office number provided.

## 2024-07-16 ENCOUNTER — OFFICE VISIT (OUTPATIENT)
Facility: LOCATION | Age: 74
End: 2024-07-16
Payer: MEDICARE

## 2024-07-16 VITALS
DIASTOLIC BLOOD PRESSURE: 52 MMHG | OXYGEN SATURATION: 92 % | HEIGHT: 67 IN | HEART RATE: 76 BPM | BODY MASS INDEX: 23.13 KG/M2 | WEIGHT: 147.38 LBS | SYSTOLIC BLOOD PRESSURE: 110 MMHG

## 2024-07-16 DIAGNOSIS — E55.9 VITAMIN D DEFICIENCY: ICD-10-CM

## 2024-07-16 DIAGNOSIS — D84.9 IMMUNOSUPPRESSED STATUS (HCC): ICD-10-CM

## 2024-07-16 DIAGNOSIS — E78.00 HYPERCHOLESTEROLEMIA: ICD-10-CM

## 2024-07-16 DIAGNOSIS — E53.8 VITAMIN B12 DEFICIENCY: ICD-10-CM

## 2024-07-16 DIAGNOSIS — K51.00 ULCERATIVE PANCOLITIS WITHOUT COMPLICATION (HCC): ICD-10-CM

## 2024-07-16 DIAGNOSIS — R73.03 PREDIABETES: ICD-10-CM

## 2024-07-16 DIAGNOSIS — M81.0 AGE-RELATED OSTEOPOROSIS WITHOUT CURRENT PATHOLOGICAL FRACTURE: Primary | ICD-10-CM

## 2024-07-16 DIAGNOSIS — L88 PYODERMA GANGRENOSUM (HCC): ICD-10-CM

## 2024-07-16 DIAGNOSIS — G62.9 NEUROPATHY: ICD-10-CM

## 2024-07-16 DIAGNOSIS — M54.17 LUMBOSACRAL RADICULITIS: ICD-10-CM

## 2024-07-16 PROCEDURE — 99205 OFFICE O/P NEW HI 60 MIN: CPT | Performed by: INTERNAL MEDICINE

## 2024-07-16 RX ORDER — MULTIVITAMIN WITH IRON
50 TABLET ORAL DAILY
COMMUNITY

## 2024-07-16 NOTE — PROGRESS NOTES
New Patient Evaluation - History and Physical    CONSULT - Reason for Visit:    osteoporosis   Requesting Physician:   .Yolanda Kan MD    CHIEF COMPLAINT:    Chief Complaint   Patient presents with    Consult     Was recommended to see endocrinologist due DEXA scan done 6/24. Was referred by Loreta Walker MD. Patient denies recent falls, fractures, or major dental work.        HISTORY OF PRESENT ILLNESS:   Madelyn Machado is a 73 year old female who presents with  ulcerative colitis, pyoderma on Remicade and osteoporosis      Osteoporosis, was Dx in ~2016 6/2024, DXA showed  osteopenia with frax 16, 3.5   Not on treatment   Fragility fracture: no  Height loss: 1.5 inches  Q 8-10 weeks gets steroids and remicade  since 2017  Denied risk factors: steroid use, alcohol abuse, liver disease, kidney disease, hyperthyroid, seizure medications.   Family history of osteoporosis or fragility fracture: both parents lost ht     Patient is on fall precaution.  Patient is taking Vitamin D2 weekl since 6/2024  Goes tot he gym M to F, walks 2 miles, wt lifting, then swim.    Educated the patient to do wt-bearing exercise, but avoid falls.  Seeing the dentist now       ASSESSMENT AND PLAN:    73 year old  female who presents with  ulcerative colitis, pyoderma on remicade and osteoporosis     She has few risk factors and scan showed high FRAX score     plan  We need dental clearance before we start treatment   Will give info to read  RTC in 1 mo to discuss more   Take vitamin D3 2000 international unit a day and calcium 600 mg twice a day or 3 servings of dairy a day   Do weight bearing exercise   Follow fall precautions   educational material          https://www.[employer]/contents/medicines-for-osteoporosis-the-basics?search=osteoporosis&source=search_result&selectedTitle=2~150&usage_type=default&display_rank=2    https://www.[employer]/contents/osteoporosis-the-basics?search=osteoporosis&source=search_result&selectedTitle=1~150&usage_type=default&display_rank=1    https://www.[employer]/contents/zoledronic-acid-patient-drug-information?search=zoledronic%20acid&source=panel_search_result&selectedTitle=1~148&usage_type=panel&kp_tab=drug_patient&display_rank=1    We discussed diagnosis, treatment options, fall risk, long term complications and side effect.   Discussed options oral bisphosphonate vs infusion bisphosphonate vs Denosumab   Side effect from osteoporosis meds   Hypocalcemia: Adequately supplement calcium and vitamin D during  Osteonecrosis of the Jaw: Monitor for symptoms.   Atypical Femoral Fracture: Evaluate new or unusual thigh, hip, or groin      Lab Results   Component Value Date    A1C 5.7 (H) 06/10/2024    A1C 6.0 (H) 10/19/2023    A1C 6.0 (H) 03/03/2023    A1C 5.6 11/13/2019    A1C 5.7 (H) 06/03/2019           PAST MEDICAL HISTORY:   Past Medical History:    Anesthesia complication    Colitis    Colon polyps    Internal hemorrhoids    Lipid screening    per NG    Nasal fracture    Osteoarthritis    PONV (postoperative nausea and vomiting)    Pyoderma    Ulcerative colitis (HCC)       PAST SURGICAL HISTORY:   Past Surgical History:   Procedure Laterality Date    Colonoscopy  2014    Colonoscopy N/A 12/29/2017    Procedure: COLONOSCOPY;  Surgeon: Carri Aparicio MD;  Location: [facility] ENDOSCOPY    Colonoscopy N/A 11/7/2019    Procedure: COLONOSCOPY;  Surgeon: Marcie Acevedo MD;  Location: [facility] ENDOSCOPY    Colonoscopy N/A 12/15/2021    Procedure: COLONOSCOPY;  Surgeon: Marcie Acevedo MD;  Location: [facility] ENDO    Excisional biospy right  1967    Hysterectomy  11/06/2017    TVH/BSO/vag vault suspension / post repair    Fabby  localization wire 1 site right (cpt=19281)      benign tumor removed        CURRENT MEDICATIONS:     vitamin B-12 50 MCG Oral Tab Take 1 tablet (50 mcg total) by mouth daily.      ergocalciferol 1.25 MG (70276 UT) Oral Cap Take 1 capsule (50,000 Units total) by mouth once a week for 12 doses. 12 capsule 0    Ferrous Sulfate 324 (65 Fe) MG Oral Tab EC Take 1 tablet by mouth Every Monday, Wednesday, and Friday. 18 tablet 0    InFLIXimab (REMICADE IV) Inject into the vein.         ALLERGIES:  Allergies   Allergen Reactions    Sulfa Antibiotics PAIN       SOCIAL HISTORY:    Social History     Socioeconomic History    Marital status:    Tobacco Use    Smoking status: Former     Current packs/day: 0.00     Types: Cigarettes     Quit date: 2004     Years since quittin.0    Smokeless tobacco: Former   Vaping Use    Vaping status: Never Used   Substance and Sexual Activity    Alcohol use: Yes     Alcohol/week: 0.0 standard drinks of alcohol     Comment: very minimally- one drink per weekend; socially    Drug use: No    Sexual activity: Not Currently   Other Topics Concern    Caffeine Concern Yes     Comment: coffee, 2 cups daily    Pt has a pacemaker No    Pt has a defibrillator No    Reaction to local anesthetic No   Office work  Coffee 2 /day  Smoking stopped  Marijuana no  Etoh x2/mo  Drugs no  FAMILY HISTORY:   Family History   Problem Relation Age of Onset    Diabetes Father     Pacemaker Father     Heart Disease Father     Melanoma Father     Diabetes Mother         DM    Cancer Mother         leukemia    Cancer Maternal Grandmother         lung    Cancer Paternal Grandmother         stomach    Cancer Sister         breast    Breast Cancer Sister 58        age 58    Heart Disorder Neg         Premature CAD, family h/o    Colon Cancer Neg         Family h/o        REVIEW OF SYSTEMS:  All negative other than HPI    PHYSICAL EXAM:   Height: 5' 7\" (170.2 cm) ( 1122)  Weight: 147 lb 6.4 oz (66.9  kg) (07/16 1122)  BSA (Calculated - sq m): 1.78 sq meters (07/16 1122)  Pulse: 76 (07/16 1122)  BP: 110/52 (07/16 1122)  Temp: --  Do Not Use - Resp Rate: --  SpO2: 92 % (07/16 1122)    Body mass index is 23.09 kg/m².  No spine tenderness     CONSTITUTIONAL:  Awake and alert. Age appropriate, good hygiene not in acute distress. Well-nourished and well developed. no acute distress   PSYCH:   Orientated to time, place, person & situation, Normal mood and affect, memory intact, normal insight and judgment, cooperative  Neuro: speech is clear. Awake, alert, no aphasia, no facial asymmetry, no nuchal rigidity  EYES:  No proptosis, no ptosis, conjunctiva normal  ENT:  Normocephalic, atraumatic  Eye: EOMI, normal lids, no discharge, no conjunctival erythema. No exophthalmos/proptosis, Ptosis negative   No rhinorrhea, moist oral mucosa  Neck: full range of motion  Neck/Thyroid: neck inspection: normal, No scar, No goiter   LUNGS:  No acute respiratory distress, non-labored respiration. Speaking full sentences  CARDIOVASCULAR:  regular rate   ABDOMEN:  No abdominal pain.   SKIN:  no bruising or bleeding, no rashes and no lesions, Skin is dry, no obvious rashes or lesions  EXTREMITIES: no gross abnormality   MSK: Moves extremities spontaneously. full range of motion in all major joints      DATA:     Pertinent data reviewed   Latest Reference Range & Units 05/31/24 08:59   CREATININE 0.55 - 1.02 mg/dL 0.70   CALCIUM 8.5 - 10.1 mg/dL 9.0   EGFR >=60 mL/min/1.73m2 92      Latest Reference Range & Units 06/10/24 15:21   VITAMIN D, 25-OH, TOTAL 30.0 - 100.0 ng/mL 28.0 (L)   (L): Data is abnormally low   6/2024 DXA     CONCLUSION:  1. Scans of the lumbar spine and left hip are consistent with osteopenia, which according to World Health Organization criteria place the patient at a mild-to-moderately increased risk for fracture.     2. Compared to the prior study, bone mineral density has decreased in the left hip and lumbar  spine.     3. Based on left femoral neck bone mineral density, the FRAX 10 year probability of a major osteoporotic fracture is 16% and the 10 year probability of a hip fracture is 3.5%          No results for input(s): \"TSH\", \"T4F\", \"T3F\", \"THYP\" in the last 72 hours.  No results found.    No orders of the defined types were placed in this encounter.    Orders Placed This Encounter    vitamin B-12 50 MCG Oral Tab     Sig: Take 1 tablet (50 mcg total) by mouth daily.          This is a specialized patient consultation in endocrinology and required comprehensive review of prior records, as well as current evaluation, with time required for consideration of complex endocrine issues and consultation. For this visit, I personally interviewed the patient, and family member if accompanied, performed the pertinent parts of the history and physical examination. ROS included screening for appropriate endocrine conditions.   Today's diagnosis and plan were reviewed in detail with the patient who states understanding and agrees with plan. I discussed with the patient possible diagnosis, differential diagnosis, need for work up, treatment options, alternatives and side effects.     Please see note for details about time spent which includes:   · pre-visit preparation  · reviewing records  · face to face time with the patient   · timely documentation of the encounter  · ordering medications/tests  · communication with care team  · care coordination    I appreciate the opportunity to be part of your patient's medical care and will keep you, as the referring and primary physicians, informed about the care of your patient. Please feel free to contact me should you have any questions.    The 21st Century Cures Act makes medical notes like these available to patients in the interest of transparency. Please be advised this is a medical document. Medical documents are intended to carry relevant information, facts as evident, and the  clinical opinion of the practitioner. The medical note is intended as peer to peer communication and may appear blunt or direct. It is written in medical language and may contain abbreviations or verbiage that are unfamiliar.   Franco Tate MD

## 2024-07-16 NOTE — PATIENT INSTRUCTIONS
We need dental clearance before we start treatment   Will give info to read  RTC in 1 mo to discuss more   Take vitamin D3 2000 international unit a day and calcium 600 mg twice a day or 3 servings of dairy a day   Do weight bearing exercise   Follow fall precautions   educational material     https://www.[employer]/contents/medicines-for-osteoporosis-the-basics?search=osteoporosis&source=search_result&selectedTitle=2~150&usage_type=default&display_rank=2    https://www.[employer]/contents/osteoporosis-the-basics?search=osteoporosis&source=search_result&selectedTitle=1~150&usage_type=default&display_rank=1    https://www.[employer]/contents/zoledronic-acid-patient-drug-information?search=zoledronic%20acid&source=panel_search_result&selectedTitle=1~148&usage_type=panel&kp_tab=drug_patient&display_rank=1    We discussed diagnosis, treatment options, fall risk, long term complications and side effect.   Discussed options oral bisphosphonate vs infusion bisphosphonate vs Denosumab   Side effect from osteoporosis meds   Hypocalcemia: Adequately supplement calcium and vitamin D during  Osteonecrosis of the Jaw: Monitor for symptoms.   Atypical Femoral Fracture: Evaluate new or unusual thigh, hip, or groin

## 2024-07-24 RX ORDER — FERROUS SULFATE 324(65)MG
1 TABLET, DELAYED RELEASE (ENTERIC COATED) ORAL
Qty: 18 TABLET | Refills: 0 | OUTPATIENT
Start: 2024-07-24 | End: 2024-09-07

## 2024-07-24 NOTE — TELEPHONE ENCOUNTER
Lab note 06/10/2024:  Mild Iron deficiency. I will start iron for you to take on Monday Wednesday and Friday for 6 weeks, then follow up in office to repeat labs.

## 2024-07-25 ENCOUNTER — OFFICE VISIT (OUTPATIENT)
Dept: DERMATOLOGY CLINIC | Facility: CLINIC | Age: 74
End: 2024-07-25
Payer: MEDICARE

## 2024-07-25 DIAGNOSIS — D84.9 IMMUNOSUPPRESSED STATUS (HCC): ICD-10-CM

## 2024-07-25 DIAGNOSIS — L82.1 SK (SEBORRHEIC KERATOSIS): Primary | ICD-10-CM

## 2024-07-25 DIAGNOSIS — K51.00 ULCERATIVE PANCOLITIS WITHOUT COMPLICATION (HCC): ICD-10-CM

## 2024-07-25 DIAGNOSIS — L81.4 LENTIGO: ICD-10-CM

## 2024-07-25 DIAGNOSIS — D22.9 MULTIPLE NEVI: ICD-10-CM

## 2024-07-25 DIAGNOSIS — D23.9 BENIGN NEOPLASM OF SKIN, UNSPECIFIED LOCATION: ICD-10-CM

## 2024-07-25 DIAGNOSIS — L88 PYODERMA GANGRENOSUM (HCC): ICD-10-CM

## 2024-07-25 PROCEDURE — 99203 OFFICE O/P NEW LOW 30 MIN: CPT | Performed by: DERMATOLOGY

## 2024-08-04 NOTE — PROGRESS NOTES
Madelyn Machado is a 73 year old female.  HPI:     CC:    Chief Complaint   Patient presents with    Full Skin Exam     \"New Patient\" present for FBSE. Denies personal Hx of skin cancer. Has family Hx of melanoma(Father). Has sensitive, small lesions of concern on her scalp.           HISTORY:    Past Medical History:    Anesthesia complication    Colitis    Colon polyps    Internal hemorrhoids    Lipid screening    per NG    Nasal fracture    Osteoarthritis    PONV (postoperative nausea and vomiting)    Pyoderma    Ulcerative colitis (HCC)      Past Surgical History:   Procedure Laterality Date    Colonoscopy      Colonoscopy N/A 2017    Procedure: COLONOSCOPY;  Surgeon: Carri Aparicio MD;  Location: [facility] ENDOSCOPY    Colonoscopy N/A 2019    Procedure: COLONOSCOPY;  Surgeon: Marcie Acevedo MD;  Location: [facility] ENDOSCOPY    Colonoscopy N/A 12/15/2021    Procedure: COLONOSCOPY;  Surgeon: Marcie Acevedo MD;  Location: [facility] ENDO    Excisional biospy right  1967    Hysterectomy  2017    TVH/BSO/vag vault suspension / post repair    Fabby localization wire 1 site right (cpt=19281)      benign tumor removed 1967      Family History   Problem Relation Age of Onset    Diabetes Father     Pacemaker Father     Heart Disease Father     Melanoma Father     Diabetes Mother         DM    Cancer Mother         leukemia    Cancer Maternal Grandmother         lung    Cancer Paternal Grandmother         stomach    Cancer Sister         breast    Breast Cancer Sister 58        age 58    Heart Disorder Neg         Premature CAD, family h/o    Colon Cancer Neg         Family h/o      Social History     Socioeconomic History    Marital status:    Tobacco Use    Smoking status: Former     Current packs/day: 0.00     Types: Cigarettes     Quit date: 2004     Years since quittin.0    Smokeless tobacco: Former   Vaping Use    Vaping status: Never Used   Substance and Sexual Activity    Alcohol  use: Yes     Alcohol/week: 0.0 standard drinks of alcohol     Comment: very minimally- one drink per weekend; socially    Drug use: No    Sexual activity: Not Currently   Other Topics Concern    Caffeine Concern Yes     Comment: coffee, 2 cups daily    History of tanning Yes    Pt has a pacemaker No    Pt has a defibrillator No    Reaction to local anesthetic No     Social Determinants of Health      [facility]    Social Connections    Received from [facility]    Housing Stability        Current Outpatient Medications   Medication Sig Dispense Refill    vitamin B-12 50 MCG Oral Tab Take 1 tablet (50 mcg total) by mouth daily.      ergocalciferol 1.25 MG (70823 UT) Oral Cap Take 1 capsule (50,000 Units total) by mouth once a week for 12 doses. 12 capsule 0    InFLIXimab (REMICADE IV) Inject into the vein.      PEG 3350-KCl-Na Bicarb-NaCl (TRILYTE) 420 g Oral Recon Soln Take 4,000 mL by mouth As Directed. 4000 mL 0    Ascorbic Acid (VITAMIN C) 1000 MG Oral Tab Take 1 tablet (1,000 mg total) by mouth daily. (Patient not taking: Reported on 4/22/2024)      B Complex-C-Folic Acid Oral Tab Take by mouth. (Patient not taking: Reported on 4/22/2024)      Multiple Vitamins-Minerals (CENTRUM SILVER ADULT 50+) Oral Tab Take 1 tablet by mouth daily. (Patient not taking: Reported on 4/22/2024)      cholecalciferol (VITAMIN D HIGH POTENCY) 25 MCG (1000 UT) Oral Cap Take 1 capsule (1,000 Units total) by mouth daily. (Patient not taking: Reported on 4/22/2024) 30 capsule 0    triamcinolone 0.5 % External Cream Apply 1 Application topically 2 (two) times daily. (Patient not taking: Reported on 6/5/2024) 30 g 1    Biotin 5000 MCG Sublingual SL Tab Place 1 capsule under the tongue daily. (Patient not taking: Reported on 7/16/2024) 90 tablet 4     Allergies:   Allergies   Allergen Reactions    Sulfa Antibiotics PAIN       Past Medical History:     Anesthesia complication    Colitis    Colon polyps    Internal hemorrhoids    Lipid screening    per NG    Nasal fracture    Osteoarthritis    PONV (postoperative nausea and vomiting)    Pyoderma    Ulcerative colitis (HCC)     Past Surgical History:   Procedure Laterality Date    Colonoscopy      Colonoscopy N/A 2017    Procedure: COLONOSCOPY;  Surgeon: Carri Aparicio MD;  Location: [facility] ENDOSCOPY    Colonoscopy N/A 2019    Procedure: COLONOSCOPY;  Surgeon: Marcie Acevedo MD;  Location: [facility] ENDOSCOPY    Colonoscopy N/A 12/15/2021    Procedure: COLONOSCOPY;  Surgeon: Marcie Acevedo MD;  Location: [facility] ENDO    Excisional biospy right      Hysterectomy  2017    TVH/BSO/vag vault suspension / post repair    Fabby localization wire 1 site right (cpt=19281)      benign tumor removed      Social History     Socioeconomic History    Marital status:      Spouse name: Not on file    Number of children: Not on file    Years of education: Not on file    Highest education level: Not on file   Occupational History    Not on file   Tobacco Use    Smoking status: Former     Current packs/day: 0.00     Types: Cigarettes     Quit date: 2004     Years since quittin.0    Smokeless tobacco: Former   Vaping Use    Vaping status: Never Used   Substance and Sexual Activity    Alcohol use: Yes     Alcohol/week: 0.0 standard drinks of alcohol     Comment: very minimally- one drink per weekend; socially    Drug use: No    Sexual activity: Not Currently   Other Topics Concern     Service Not Asked    Blood Transfusions Not Asked    Caffeine Concern Yes     Comment: coffee, 2 cups daily    Occupational Exposure Not Asked    Hobby Hazards Not Asked    Sleep Concern Not Asked    Stress Concern Not Asked    Weight Concern Not Asked    Special Diet Not Asked    Back Care Not Asked    Exercise Not Asked    Bike Helmet Not Asked    Seat Belt Not Asked    Self-Exams Not Asked    Grew up on  a farm Not Asked    History of tanning Yes    Outdoor occupation Not Asked    Pt has a pacemaker No    Pt has a defibrillator No    Breast feeding Not Asked    Reaction to local anesthetic No   Social History Narrative    Not on file     Social Determinants of Health     Financial Resource Strain: Not on file   Food Insecurity: Not on file   Transportation Needs: Not on file   Physical Activity: Not on file   Stress: Not on file   Social Connections: Unknown (3/15/2021)    [facility]    Social Connections     Conversations with friends/family/neighbors per week: Not on file   Housing Stability: Low Risk  (4/22/2024)    Received from [facility]    Housing Stability     Mortgage Payment Concerns?: Not on file     Number of Places Lived in the Last Year: Not on file     Unstable Housing?: Not on file     Family History   Problem Relation Age of Onset    Diabetes Father     Pacemaker Father     Heart Disease Father     Melanoma Father     Diabetes Mother         DM    Cancer Mother         leukemia    Cancer Maternal Grandmother         lung    Cancer Paternal Grandmother         stomach    Cancer Sister         breast    Breast Cancer Sister 58        age 58    Heart Disorder Neg         Premature CAD, family h/o    Colon Cancer Neg         Family h/o       There were no vitals filed for this visit.    HPI:  Chief Complaint   Patient presents with    Full Skin Exam     \"New Patient\" present for FBSE. Denies personal Hx of skin cancer. Has family Hx of melanoma(Father). Has sensitive, small lesions of concern on her scalp.       Patient last seen by LSS 2019, family history melanoma in her father, patient longstanding immunosuppressive therapy on Remicade for ulcerative colitis, history of pyoderma gangrenosum, keloids.    Patient presents with concerns above.    Patient has been in their usual state of health.     Past notes/ records  and appropriate/relevant lab results including pathology and past body maps reviewed. Including outside notes/ PCP notes as appropriate. Updated and new information noted in current visit.     ROS:  Denies other relevant systemic complaints.      History, medications, allergies reviewed as noted.    Allergies:  Sulfa antibiotics     Physical Examination:     Well-developed well-nourished patient alert oriented in no acute distress.  Exam performed, including scalp, head, neck, face,nails, hair, external eyes, including conjunctival mucosa, eyelids, lips external ears , arms, digits,palms.     Multiple light to medium brown, well marginated, uniformly pigmented, macules and papules 6 mm and less scattered on exam. pigmented lesions examined with dermoscopy benign-appearing patterns.     Waxy tannish keratotic papules scattered, cherry-red vascular papules scattered.    See map today's date for lesions noted .  See assessment and plan below for specific lesions.    Otherwise remarkable for lesions as noted on map.    See A/P  below for additional information:    Assessment / plan:    No orders of the defined types were placed in this encounter.      Meds & Refills for this Visit:  Requested Prescriptions      No prescriptions requested or ordered in this encounter         Encounter Diagnoses   Name Primary?    SK (seborrheic keratosis) Yes    Benign neoplasm of skin, unspecified location     Lentigo     Multiple nevi     Ulcerative pancolitis without complication (HCC)     Immunosuppressed status (HCC)     Pyoderma gangrenosum (HCC)        History of pyoderma gangrenosum no active lesions.  Prominent areas on back lower leg with scarring.  Monitor.  Patient with ulcerative colitis, immunosuppressed at risk for additional skin cancers.  Patient systemic medications controlling her pyoderma gangrenosum.  On infliximab long-term for above.  Regular skin exams recommended given immunosuppression, history of scars, PEG.   More hypertrophic scarring noted in the past as well.    Previously seen by LSS    Family history of melanoma in father, patient at risk for skin cancers atypical nevi melanoma as well.  Continue careful sun protection monitoring, annual skin exam  Benign-appearing nevi, no atypical features on dermoscopy reassurance given monitor.     Waxy tan keratotic papules lesions in areas of concern as noted reassurance given.  Benign nature discussed.  Possibility of cryo, alphahydroxy acids over-the-counter retinol's discussed.     No other susupicious lesions on todays  exam.    Please refer to map for specific lesions.  See additional diagnoses.  Pros cons of various therapies, risks benefits discussed.Pathophysiology discussed with patient.  Therapeutic options reviewed.  See  Medications in grid.  Instructions reviewed at length.    Benign nevi, seborrheic  keratoses, cherry angiomas:  Reassurance regarding other benign skin lesions.Signs and symptoms of skin cancer, ABCDE's of melanoma discussed with patient. Sunscreen (broad-spectrum, ideally mineral-based-UVA/UVB -SPF 30 or higher) use encouraged, sun protection/sun protective clothing, self exams reviewed Followup as noted RTC ---routine checkup    6 mos -one year or p.r.n.    Encounter Times   Including precharting, reviewing chart, prior notes obtaining history: 10 minutes, medical exam :10 minutes, notes on body map, plan, counseling 10minutes My total time spent caring for the patient on the day of the encounter: 30 minutes     The patient indicates understanding of these issues and agrees to the plan.  The patient is asked to return as noted in follow-up/ above.    This note was generated using Dragon voice recognition software.  Please contact me regarding any confusion resulting from errors in recognition..  Note to patient and family: The 21st Century Cures Act makes medical notes like these available to patients. However, be advised this is a medical  document. It is intended as [id number]-kupz communication and monitoring of a patient's care needs. It is written in medical language and may contain abbreviations or verbiage that are unfamiliar. It may appear blunt or direct. Medical documents are intended to carry relevant information, facts as evident and the clinical opinion of the practitioner.

## 2024-08-19 ENCOUNTER — TELEPHONE (OUTPATIENT)
Facility: CLINIC | Age: 74
End: 2024-08-19

## 2024-08-19 ENCOUNTER — PATIENT MESSAGE (OUTPATIENT)
Dept: GASTROENTEROLOGY | Facility: CLINIC | Age: 74
End: 2024-08-19

## 2024-08-19 ENCOUNTER — PATIENT MESSAGE (OUTPATIENT)
Dept: DERMATOLOGY CLINIC | Facility: CLINIC | Age: 74
End: 2024-08-19

## 2024-08-19 NOTE — TELEPHONE ENCOUNTER
Dr. Acevedo,   I called pt, she is scheduled for her remicade infusion on 8/22/24 but pt reports new skin lesions that started Friday night. Lesions are described as swollen, red, itchy, and painful when touching. Areas are warm \"when flaring\".      Pt denies fevers/N/V/C/D/bleeding. Pt states she has a call into the derm service, waiting for a reply. No camping but did sit at outdoor concerts in recent past. Pt unsure if it is ok to get her infusion this Thursday. Please advise.  Thanks,  Karlee        From: Madelyn Machado  To: Marcie Acevedo  Sent: 8/19/2024  2:49 PM CDT  Subject: upcoming infusion & skin irritation    I'm seeking direction on getting my infusion as scheduled for this Thursday.  Over the weekend I have started getting extremely itchy & somewhat mysterious skin \"bumps\". the 1st began with my left eye that turned into a very swollen area. the next spot is on my neck where I was using a Rx for Triamcinolone Acetonide cream that seems to help for a very short time. Another inside the crux of my rt arm. ALL swelled up from 1\" to 3\" diameter & were accompanied with an annoying pain. 2 more itchy areas have appeared today on my left leg. What is your recommendation about getting my infusion on Thursday (8/22) if these things have not diminished?

## 2024-08-19 NOTE — TELEPHONE ENCOUNTER
From: Madelyn Machado  To: Marcie Acevedo  Sent: 8/19/2024 2:49 PM CDT  Subject: upcoming infusion & skin irritation    I'm seeking direction on getting my infusion as scheduled for this Thursday. Over the weekend I have started getting extremely itchy & somewhat mysterious skin \"bumps\". the 1st began with my left eye that turned into a very swollen area. the next spot is on my neck where I was using a Rx for Triamcinolone Acetonide cream that seems to help for a very short time. Another inside the crux of my rt arm. ALL swelled up from 1\" to 3\" diameter & were accompanied with an annoying pain. 2 more itchy areas have appeared today on my left leg. What is your recommendation about getting my infusion on Thursday (8/22) if these things have not diminished?

## 2024-08-20 NOTE — TELEPHONE ENCOUNTER
If fevers and warm then check with ID and delay infusion  If itchy and no fevers might be bug bite. Worry is supra infection.   Ok to delay a week or two if needed

## 2024-08-20 NOTE — TELEPHONE ENCOUNTER
RN called and spoke to pt. Pt states she is doing well, reddened and itchy spots on skin have improved. Pt still denies fevers or any other signs of illness. Informed pt of MD message per below. Pt will keep infusion as scheduled for now but will contact us for any new needs.

## 2024-08-22 ENCOUNTER — OFFICE VISIT (OUTPATIENT)
Dept: HEMATOLOGY/ONCOLOGY | Facility: HOSPITAL | Age: 74
End: 2024-08-22
Attending: INTERNAL MEDICINE
Payer: MEDICARE

## 2024-08-22 VITALS
BODY MASS INDEX: 23 KG/M2 | DIASTOLIC BLOOD PRESSURE: 51 MMHG | RESPIRATION RATE: 18 BRPM | SYSTOLIC BLOOD PRESSURE: 99 MMHG | TEMPERATURE: 98 F | WEIGHT: 144.19 LBS | OXYGEN SATURATION: 95 % | HEART RATE: 75 BPM

## 2024-08-22 DIAGNOSIS — K51.00 ULCERATIVE PANCOLITIS WITHOUT COMPLICATION (HCC): Primary | ICD-10-CM

## 2024-08-22 PROCEDURE — 96415 CHEMO IV INFUSION ADDL HR: CPT

## 2024-08-22 PROCEDURE — 96375 TX/PRO/DX INJ NEW DRUG ADDON: CPT

## 2024-08-22 PROCEDURE — 96413 CHEMO IV INFUSION 1 HR: CPT

## 2024-08-22 RX ORDER — DIPHENHYDRAMINE HYDROCHLORIDE 50 MG/ML
25 INJECTION INTRAMUSCULAR; INTRAVENOUS ONCE
OUTPATIENT
Start: 2024-10-17

## 2024-08-22 RX ORDER — ACETAMINOPHEN 325 MG/1
650 TABLET ORAL EVERY 6 HOURS PRN
OUTPATIENT
Start: 2024-10-17

## 2024-08-22 RX ORDER — ACETAMINOPHEN 325 MG/1
TABLET ORAL
Status: COMPLETED
Start: 2024-08-22 | End: 2024-08-22

## 2024-08-22 RX ORDER — DIPHENHYDRAMINE HCL 25 MG
25 CAPSULE ORAL ONCE
OUTPATIENT
Start: 2024-10-17

## 2024-08-22 RX ORDER — ACETAMINOPHEN 325 MG/1
650 TABLET ORAL ONCE
OUTPATIENT
Start: 2024-10-17

## 2024-08-22 RX ORDER — DIPHENHYDRAMINE HCL 25 MG
25 CAPSULE ORAL ONCE
Status: COMPLETED | OUTPATIENT
Start: 2024-08-22 | End: 2024-08-22

## 2024-08-22 RX ORDER — DIPHENHYDRAMINE HCL 25 MG
CAPSULE ORAL
Status: COMPLETED
Start: 2024-08-22 | End: 2024-08-22

## 2024-08-22 RX ORDER — ACETAMINOPHEN 325 MG/1
650 TABLET ORAL ONCE
Status: COMPLETED | OUTPATIENT
Start: 2024-08-22 | End: 2024-08-22

## 2024-08-22 RX ADMIN — DIPHENHYDRAMINE HCL 25 MG: 25 MG CAPSULE ORAL at 14:21:00

## 2024-08-22 RX ADMIN — ACETAMINOPHEN 650 MG: 325 TABLET ORAL at 14:21:00

## 2024-08-22 NOTE — PROGRESS NOTES
Pt here for remicade . Pt denies any issues or concerns.      Ordering Provider: Curtis Aguero Exp: 4/25-ordered every 8 weeks     Pt tolerated infusion without difficulty or complaint. Reviewed next apt date/time: yes      Education Record  Learner:  Patient  Disease / Diagnosis: UC  Barriers / Limitations:  None  Method:  Discussion  General Topics:  Plan of care reviewed  Outcome:  Shows understanding

## 2024-08-29 ENCOUNTER — LAB ENCOUNTER (OUTPATIENT)
Dept: LAB | Age: 74
End: 2024-08-29
Attending: INTERNAL MEDICINE
Payer: MEDICARE

## 2024-08-29 PROCEDURE — 82306 VITAMIN D 25 HYDROXY: CPT | Performed by: INTERNAL MEDICINE

## 2024-08-29 PROCEDURE — 36415 COLL VENOUS BLD VENIPUNCTURE: CPT | Performed by: INTERNAL MEDICINE

## 2024-09-12 ENCOUNTER — LAB ENCOUNTER (OUTPATIENT)
Dept: LAB | Age: 74
End: 2024-09-12
Attending: INTERNAL MEDICINE
Payer: MEDICARE

## 2024-09-12 ENCOUNTER — TELEPHONE (OUTPATIENT)
Facility: CLINIC | Age: 74
End: 2024-09-12

## 2024-09-12 DIAGNOSIS — K51.919 ULCERATIVE COLITIS WITH COMPLICATION, UNSPECIFIED LOCATION (HCC): ICD-10-CM

## 2024-09-12 LAB
ALBUMIN SERPL-MCNC: 4.1 G/DL (ref 3.2–4.8)
ALBUMIN/GLOB SERPL: 1.2 {RATIO} (ref 1–2)
ALP LIVER SERPL-CCNC: 68 U/L
ALT SERPL-CCNC: 24 U/L
ANION GAP SERPL CALC-SCNC: 8 MMOL/L (ref 0–18)
AST SERPL-CCNC: 30 U/L (ref ?–34)
BASOPHILS # BLD AUTO: 0.05 X10(3) UL (ref 0–0.2)
BASOPHILS NFR BLD AUTO: 0.8 %
BILIRUB SERPL-MCNC: 0.4 MG/DL (ref 0.2–1.1)
BUN BLD-MCNC: 18 MG/DL (ref 9–23)
CALCIUM BLD-MCNC: 9.6 MG/DL (ref 8.7–10.4)
CHLORIDE SERPL-SCNC: 107 MMOL/L (ref 98–112)
CO2 SERPL-SCNC: 25 MMOL/L (ref 21–32)
CREAT BLD-MCNC: 0.84 MG/DL
CRP SERPL-MCNC: <0.4 MG/DL (ref ?–0.5)
EGFRCR SERPLBLD CKD-EPI 2021: 73 ML/MIN/1.73M2 (ref 60–?)
EOSINOPHIL # BLD AUTO: 0.13 X10(3) UL (ref 0–0.7)
EOSINOPHIL NFR BLD AUTO: 2.1 %
ERYTHROCYTE [DISTWIDTH] IN BLOOD BY AUTOMATED COUNT: 13.8 %
ERYTHROCYTE [SEDIMENTATION RATE] IN BLOOD: 32 MM/HR
FASTING STATUS PATIENT QL REPORTED: YES
GLOBULIN PLAS-MCNC: 3.4 G/DL (ref 2–3.5)
GLUCOSE BLD-MCNC: 165 MG/DL (ref 70–99)
HCT VFR BLD AUTO: 35.4 %
HGB BLD-MCNC: 12.4 G/DL
IMM GRANULOCYTES # BLD AUTO: 0.01 X10(3) UL (ref 0–1)
IMM GRANULOCYTES NFR BLD: 0.2 %
LYMPHOCYTES # BLD AUTO: 1.79 X10(3) UL (ref 1–4)
LYMPHOCYTES NFR BLD AUTO: 29.3 %
MCH RBC QN AUTO: 32 PG (ref 26–34)
MCHC RBC AUTO-ENTMCNC: 35 G/DL (ref 31–37)
MCV RBC AUTO: 91.5 FL
MONOCYTES # BLD AUTO: 0.5 X10(3) UL (ref 0.1–1)
MONOCYTES NFR BLD AUTO: 8.2 %
NEUTROPHILS # BLD AUTO: 3.63 X10 (3) UL (ref 1.5–7.7)
NEUTROPHILS # BLD AUTO: 3.63 X10(3) UL (ref 1.5–7.7)
NEUTROPHILS NFR BLD AUTO: 59.4 %
OSMOLALITY SERPL CALC.SUM OF ELEC: 296 MOSM/KG (ref 275–295)
PLATELET # BLD AUTO: 259 10(3)UL (ref 150–450)
POTASSIUM SERPL-SCNC: 3.8 MMOL/L (ref 3.5–5.1)
PROT SERPL-MCNC: 7.5 G/DL (ref 5.7–8.2)
RBC # BLD AUTO: 3.87 X10(6)UL
SODIUM SERPL-SCNC: 140 MMOL/L (ref 136–145)
WBC # BLD AUTO: 6.1 X10(3) UL (ref 4–11)

## 2024-09-12 PROCEDURE — 85025 COMPLETE CBC W/AUTO DIFF WBC: CPT

## 2024-09-12 PROCEDURE — 85652 RBC SED RATE AUTOMATED: CPT

## 2024-09-12 PROCEDURE — 36415 COLL VENOUS BLD VENIPUNCTURE: CPT

## 2024-09-12 PROCEDURE — 80053 COMPREHEN METABOLIC PANEL: CPT

## 2024-09-12 PROCEDURE — 86140 C-REACTIVE PROTEIN: CPT

## 2024-09-12 NOTE — TELEPHONE ENCOUNTER
Cancelled; [place] going out of [place] and unsure about when she is returning. She will call back when ready to reschedule    See 4/16/2024 telephone encounter

## 2024-09-17 ENCOUNTER — TELEPHONE (OUTPATIENT)
Facility: CLINIC | Age: 74
End: 2024-09-17

## 2024-09-17 NOTE — TELEPHONE ENCOUNTER
----- Message from Marcie Acevedo sent at 9/13/2024 12:08 PM CDT -----  Recall labs in 3-4 months

## 2024-09-17 NOTE — TELEPHONE ENCOUNTER
Dr. Acevedo,   Pt asking about slightly elevated sed rate. She has had similar values in the past. She is asking for feedback regarding the sed rate and glucose level.  Thanks,  Karlee

## 2024-09-17 NOTE — TELEPHONE ENCOUNTER
Recall labs in 3 months per Dr. Acevedo. Message sent to pt outreach.    Laney message sent to pt by RN.

## 2024-10-10 ENCOUNTER — TELEPHONE (OUTPATIENT)
Dept: HEMATOLOGY/ONCOLOGY | Facility: HOSPITAL | Age: 74
End: 2024-10-10

## 2024-10-10 NOTE — TELEPHONE ENCOUNTER
Patient calling to cancel her keytruda appt 10/17 she is having [facility] [place] [facility] in [place]. tia

## 2024-10-17 ENCOUNTER — APPOINTMENT (OUTPATIENT)
Dept: HEMATOLOGY/ONCOLOGY | Facility: HOSPITAL | Age: 74
End: 2024-10-17
Attending: INTERNAL MEDICINE
Payer: MEDICARE

## 2025-06-18 ENCOUNTER — PATIENT MESSAGE (OUTPATIENT)
Dept: GASTROENTEROLOGY | Facility: CLINIC | Age: 75
End: 2025-06-18

## 2025-06-19 NOTE — TELEPHONE ENCOUNTER
Madelyn TAYLOR  Gi Clinical Staff (supporting Marcie Acevedo MD)Yesterday (12:33 AM)       Hi Dr Acevedo, I've returned to [facility] & would like to get my infusion here during my stay.  I'll need to have one approx Aug 1st; my last infusion was on May 23, 2025 in [place]. I've had my colonoscopy & routine infusions at [facility] since leaving here in Sept 2024 along with following labs all done in May 2025:  STOOL CALPROTECTIN  WHITE BLOOD CELL DIFFERENTIAL  ASPARTATE AMINOTRANSFERASE (AST)  LIVER FUNCTION PANEL  CBC WITH DIFFERENTIAL  C-REACTIVE PROTEIN  CREATININE  HEPATIC (LIVER) FUNCTION PANEL  ERYTHROCYTE SEDIMENTATION RATE (ESR)  INFLIXIMAB LEVEL & INFLIXIMAB ANTIBODY PANEL, ECLIA  Please advise for next infusion at [facility]. Thank you, Madelyn Machado [phone]

## 2025-06-21 NOTE — TELEPHONE ENCOUNTER
Reviewed labs  If symptoms stable, ok to schedule here per patient request  She was getting it prior so can ask where and what dose she is currently on and can accommodate    Marcie Acevedo MD

## 2025-06-26 NOTE — TELEPHONE ENCOUNTER
Madelyn Honeycutt Gi Clinical Staff (supporting Marcie Acevedo MD)2 days ago       I've received my last treatments at [facility] in [place], [place]. Attaching my last \"visit summary\" of May 23, 2025 that describes the medication & dosage...   Attachments   [id number]

## 2025-07-02 ENCOUNTER — TELEPHONE (OUTPATIENT)
Facility: CLINIC | Age: 75
End: 2025-07-02

## 2025-07-02 NOTE — TELEPHONE ENCOUNTER
Please order remicade every 8 weeks  Do not need loading   inFLIXimab 400 mg in sodium chloride 0.9% 500 mL IVPB   (Cannot find infusion center order panel)

## 2025-07-03 RX ORDER — ACETAMINOPHEN 325 MG/1
650 TABLET ORAL EVERY 6 HOURS PRN
OUTPATIENT
Start: 2025-07-03

## 2025-07-03 RX ORDER — DIPHENHYDRAMINE HCL 25 MG
25 CAPSULE ORAL ONCE
OUTPATIENT
Start: 2025-07-03

## 2025-07-03 RX ORDER — DIPHENHYDRAMINE HYDROCHLORIDE 50 MG/ML
25 INJECTION, SOLUTION INTRAMUSCULAR; INTRAVENOUS ONCE
OUTPATIENT
Start: 2025-07-03

## 2025-07-03 RX ORDER — ACETAMINOPHEN 325 MG/1
650 TABLET ORAL ONCE
OUTPATIENT
Start: 2025-07-03

## 2025-07-03 RX ORDER — METHYLPREDNISOLONE SODIUM SUCCINATE 40 MG/ML
60 INJECTION INTRAMUSCULAR; INTRAVENOUS ONCE
Start: 2025-07-03

## 2025-07-03 NOTE — TELEPHONE ENCOUNTER
Dr. Acevedo,   Please see pended orders and sign if agreeable. Please note mg/kg ordered and edit if needed. Currently ordered at 5mg/kg with 65 kg weight.     5mg/k mg  7.5 mg/k mg    Last dose May 23, next due .    Also, included solumedrol 60 mg as premed. Please route back to nursing so we can send for insurance approval.  Thanks,  Marcie Burnett MD Physician Signed 8:06 AM     Copy     Please order remicade every 8 weeks  Do not need loading   inFLIXimab 400 mg in sodium chloride 0.9% 500 mL IVPB   (Cannot find infusion center order panel)

## 2025-07-07 RX ORDER — METHYLPREDNISOLONE SODIUM SUCCINATE 40 MG/ML
60 INJECTION INTRAMUSCULAR; INTRAVENOUS ONCE
Start: 2025-09-01

## 2025-07-07 RX ORDER — DIPHENHYDRAMINE HYDROCHLORIDE 50 MG/ML
25 INJECTION, SOLUTION INTRAMUSCULAR; INTRAVENOUS ONCE
OUTPATIENT
Start: 2025-09-01

## 2025-07-07 RX ORDER — ACETAMINOPHEN 325 MG/1
650 TABLET ORAL EVERY 6 HOURS PRN
OUTPATIENT
Start: 2025-09-01

## 2025-07-07 RX ORDER — DIPHENHYDRAMINE HCL 25 MG
25 CAPSULE ORAL ONCE
OUTPATIENT
Start: 2025-09-01

## 2025-07-07 RX ORDER — ACETAMINOPHEN 325 MG/1
650 TABLET ORAL ONCE
OUTPATIENT
Start: 2025-09-01

## 2025-07-07 NOTE — TELEPHONE ENCOUNTER
Good morning,     Verified coverage, no authorization is needed for Remicade infusion.     Okay to schedule patient      Thank You,  Indu

## 2025-07-07 NOTE — TELEPHONE ENCOUNTER
PPD: Please obtain insurance approval for infliximab infusions. Pt will be due on/around August 1. She gets infusions with us when in [place], otherwise, gets them in [place] when there. Thanks!

## 2025-07-07 NOTE — TELEPHONE ENCOUNTER
Were you able to sign the edited order (of 400 mg)? If it doesn't let you sign the infliximab 400 mg, I may need to discontinue and reorder everything. Let me know if it doesn't let you sign it.

## 2025-07-07 NOTE — TELEPHONE ENCOUNTER
Dr. Acevedo,   Please see edited infliximab order to reflect a flat dose of 400 mg. Do you want pt to stay on 5 mg/kg which is rounded to 300 mg or do you want her on a flat dose of 400 mg?      I spoke to infusion staff to inquire and they said there is an option to order a flat dose. Please sign edited order or change back to 5mg/kg if preferred.  Thanks,  Karlee

## 2025-07-09 ENCOUNTER — TELEPHONE (OUTPATIENT)
Age: 75
End: 2025-07-09

## 2025-08-06 ENCOUNTER — OFFICE VISIT (OUTPATIENT)
Facility: LOCATION | Age: 75
End: 2025-08-06
Attending: INTERNAL MEDICINE

## 2025-08-06 VITALS
OXYGEN SATURATION: 95 % | HEART RATE: 77 BPM | SYSTOLIC BLOOD PRESSURE: 107 MMHG | DIASTOLIC BLOOD PRESSURE: 66 MMHG | RESPIRATION RATE: 16 BRPM | BODY MASS INDEX: 24 KG/M2 | TEMPERATURE: 98 F | WEIGHT: 153 LBS

## 2025-08-06 DIAGNOSIS — K51.00 ULCERATIVE PANCOLITIS WITHOUT COMPLICATION (HCC): Primary | ICD-10-CM

## 2025-08-06 RX ORDER — DIPHENHYDRAMINE HCL 25 MG
25 CAPSULE ORAL ONCE
Status: COMPLETED | OUTPATIENT
Start: 2025-08-06 | End: 2025-08-06

## 2025-08-06 RX ORDER — DIPHENHYDRAMINE HCL 25 MG
25 CAPSULE ORAL ONCE
OUTPATIENT
Start: 2025-09-03

## 2025-08-06 RX ORDER — ACETAMINOPHEN 325 MG/1
650 TABLET ORAL ONCE
OUTPATIENT
Start: 2025-09-03

## 2025-08-06 RX ORDER — METHYLPREDNISOLONE SODIUM SUCCINATE 40 MG/ML
60 INJECTION INTRAMUSCULAR; INTRAVENOUS ONCE
Start: 2025-09-03

## 2025-08-06 RX ORDER — ACETAMINOPHEN 325 MG/1
TABLET ORAL
Status: COMPLETED
Start: 2025-08-06 | End: 2025-08-06

## 2025-08-06 RX ORDER — METHYLPREDNISOLONE SODIUM SUCCINATE 40 MG/ML
INJECTION INTRAMUSCULAR; INTRAVENOUS
Status: COMPLETED
Start: 2025-08-06 | End: 2025-08-06

## 2025-08-06 RX ORDER — ACETAMINOPHEN 325 MG/1
650 TABLET ORAL ONCE
Status: COMPLETED | OUTPATIENT
Start: 2025-08-06 | End: 2025-08-06

## 2025-08-06 RX ORDER — DIPHENHYDRAMINE HYDROCHLORIDE 50 MG/ML
25 INJECTION, SOLUTION INTRAMUSCULAR; INTRAVENOUS ONCE
OUTPATIENT
Start: 2025-09-03

## 2025-08-06 RX ORDER — METHYLPREDNISOLONE SODIUM SUCCINATE 40 MG/ML
60 INJECTION INTRAMUSCULAR; INTRAVENOUS ONCE
Status: COMPLETED | OUTPATIENT
Start: 2025-08-06 | End: 2025-08-06

## 2025-08-06 RX ORDER — ACETAMINOPHEN 325 MG/1
650 TABLET ORAL EVERY 6 HOURS PRN
OUTPATIENT
Start: 2025-09-03

## 2025-08-06 RX ORDER — DIPHENHYDRAMINE HCL 25 MG
CAPSULE ORAL
Status: COMPLETED
Start: 2025-08-06 | End: 2025-08-06

## 2025-08-06 RX ADMIN — DIPHENHYDRAMINE HCL 25 MG: 25 MG CAPSULE ORAL at 13:04:00

## 2025-08-06 RX ADMIN — METHYLPREDNISOLONE SODIUM SUCCINATE 60 MG: 40 INJECTION INTRAMUSCULAR; INTRAVENOUS at 13:10:00

## 2025-08-06 RX ADMIN — ACETAMINOPHEN 650 MG: 325 TABLET ORAL at 13:05:00

## (undated) DEVICE — Device: Brand: CUSTOM PROCEDURE KIT

## (undated) DEVICE — 3M™ STERI-DRAPE™ INSTRUMENT POUCH 1018L: Brand: STERI-DRAPE™

## (undated) DEVICE — TROCAR: Brand: KII FIOS FIRST ENTRY

## (undated) DEVICE — 6 ML SYRINGE LUER-LOCK TIP: Brand: MONOJECT

## (undated) DEVICE — DERMABOND LIQUID ADHESIVE

## (undated) DEVICE — FORCEP RADIAL JAW 4

## (undated) DEVICE — LITHOTOMY DRAPE: Brand: CONVERTORS

## (undated) DEVICE — 3 ML SYRINGE LUER-LOCK TIP: Brand: MONOJECT

## (undated) DEVICE — 35 ML SYRINGE REGULAR TIP: Brand: MONOJECT

## (undated) DEVICE — SOL  .9 1000ML BTL

## (undated) DEVICE — X-STREAM LAPAROSCOPIC IRRIGATION TUBING SET WITH NEZHAT-DORSEY TRUMPET VALVE, AND COJOINED SUCTION/IRRIGATION TUBING, WITHOUT PROBE TIP: Brand: X-STREAM

## (undated) DEVICE — GOWN SURG AERO BLUE PERF LG

## (undated) DEVICE — DRAPE SHEET LG

## (undated) DEVICE — STERILE LATEX POWDER-FREE SURGICAL GLOVESWITH NITRILE COATING: Brand: PROTEXIS

## (undated) DEVICE — PAD ALC 43.5X24IN PREP  LF

## (undated) DEVICE — MEDI-VAC NON-CONDUCTIVE SUCTION TUBING 6MM X 1.8M (6FT.) L: Brand: CARDINAL HEALTH

## (undated) DEVICE — TROCAR: Brand: KII SLEEVE

## (undated) DEVICE — LINE MNTR ADLT SET O2 INTMD

## (undated) DEVICE — LAPAROSCOPY: Brand: MEDLINE INDUSTRIES, INC.

## (undated) DEVICE — MINOR GENERAL: Brand: MEDLINE INDUSTRIES, INC.

## (undated) DEVICE — ENDOSCOPY PACK - LOWER: Brand: MEDLINE INDUSTRIES, INC.

## (undated) DEVICE — SPONGE LAP 18X18 XRAY STRL

## (undated) DEVICE — LAPAROSCOPIC WIRE L-HOOK ELECTRODE COATED: Brand: CLEANCOAT

## (undated) DEVICE — YANKAUER SUCTION INSTRUMENT NO CONTROL VENT, BULB TIP, CLEAR: Brand: YANKAUER

## (undated) DEVICE — Device: Brand: DEFENDO AIR/WATER/SUCTION AND BIOPSY VALVE

## (undated) DEVICE — SUTURE VICRYL 0 J340H

## (undated) DEVICE — KIT ENDO ORCAPOD 160/180/190

## (undated) DEVICE — MEDI-VAC NON-CONDUCTIVE SUCTION TUBING: Brand: CARDINAL HEALTH

## (undated) DEVICE — SUTURE VICRYL 2-0 UR-6

## (undated) DEVICE — TUBING CYSTO TUR DUAL

## (undated) DEVICE — DEVICE ESURG 10FT 3/32IN FRC

## (undated) DEVICE — NEOCLOSE GUIDE WITH DARTS

## (undated) DEVICE — SUTURE PDS II 2-0 CT-2

## (undated) DEVICE — DRAPE SHEET LAVH 124X112X30

## (undated) DEVICE — [HIGH FLOW INSUFFLATOR,  DO NOT USE IF PACKAGE IS DAMAGED,  KEEP DRY,  KEEP AWAY FROM SUNLIGHT,  PROTECT FROM HEAT AND RADIOACTIVE SOURCES.]: Brand: PNEUMOSURE

## (undated) DEVICE — 12 ML SYRINGE LUER-LOCK TIP: Brand: MONOJECT

## (undated) DEVICE — KIT CLEAN ENDOKIT 1.1OZ GOWNX2

## (undated) DEVICE — SUTURE VICRYL 0 CT-1

## (undated) DEVICE — STERILE SURGICAL LUBRICANT, METAL TUBE: Brand: SURGILUBE

## (undated) DEVICE — SPONGE LAP 4X18 XRAY STRL

## (undated) NOTE — LETTER
5/26/2023              [facility]         Dear Cathie Bernal,    [facility] records indicate that the tests ordered for you by Zulma Jiang MD  have not been done. If you have, in fact, already completed the tests or you do not wish to have the tests done, please contact our office at [address] [place] [address]. Otherwise, please proceed with the testing. To schedule a test at any [facility], call [place] Scheduling at [phone], Monday through Friday between 7:30am to 6pm and on Saturday between 8am and 1pm.   Evening and weekend appointments for your exam are available.      Labs order  C-REACTIVE PROTEIN  CBC, PLATELET; NO DIFFERENTIAL  COMP METABOLIC PANEL (14)  SED RATE, RAMOS (AUTOMATED)    Sincerely,    MD ASHLEY [facility] [address], [place] [address]  [phone]

## (undated) NOTE — ED AVS SNAPSHOT
[facility] Emergency Department    [address]     [address] [place] [address]    Phone:  [phone]    Fax:  [phone]           Jose Rafael Mitchell   MRN: [id number]    Department:  [facility] Emergency Department   Date of Visit:  5/2 - PEG 3350 Pack              Discharge Instructions       Take MiraLAX as discussed. Follow-up with gynecology and with your gastroenterologist as discussed. Return if increasing pain or other new symptoms develop.     Discharge References/Attachments and Class Registration line at [phone] or find a doctor online by visiting [email or web address].    IF THERE IS ANY CHANGE OR WORSENING OF YOUR CONDITION, CALL YOUR PRIMARY CARE PHYSICIAN AT ONCE OR RETURN IMMEDIATELY TO [address] [place] [address].     If you to explore options for quitting.     - If you have concerns related to behavioral health issues or thoughts of harming yourself, contact [address] [place] [address] at [phone].     - If you don’t have insurance, Kermit Arora

## (undated) NOTE — MR AVS SNAPSHOT
[address]  [phone]               Thank you for choosing us for your health care visit with Elenita Dobson MD.  We are glad to serve you and happy to provide you with this summar TAKE 2 CAPSULES BY MOUTH THREE TIMES DAILY BEFORE MEALS           Ferrous Sulfate  (45 Fe) MG Tbcr   Take 1 tablet by mouth daily. Pimecrolimus 1 % Crea   Apply 1 Application topically 2 (two) times daily.    Commonly known as:  ELIDEL

## (undated) NOTE — LETTER
3/27/2018          To Whom It May Concern:    Jessica Reeves is currently under my medical care. She may return to work on 03/28/18 with the following restrictions for 1 month:  -- She must sit for the majority of the day.    -- She must wear the surgica

## (undated) NOTE — MR AVS SNAPSHOT
[address] [place] [address]  [phone]               Thank you for choosing us for your health care visit with LAZARO Goff.   We are glad to serve you and happy to provide you wi Instructions and Information about Your Health     None      Allergies as of Jun 22, 2017     Adhesive Tape Rash    Sulfa Antibiotics Pain                   Current Medications          This list is accurate as of: 6/22/17  2:36 PM.  Always use your most r Visit [facility] online at  [email or web address]

## (undated) NOTE — MR AVS SNAPSHOT
After Visit Summary   7/10/2019    Prashant Parent    MRN: [id number]           Visit Information     Date & Time  7/10/2019  4:30 PM Provider   [address] - Infusion Dept.  Phone  [phone]      Your If you receive a survey from [employer], please take a few minutes to complete it and provide feedback. We strive to deliver the best patient experience and are looking for ways to make improvements. Your feedback will help us do so.  For more information EMERGENCY ROOM         Life-threatening emergencies needing immediate intervention at a hospital emergency room.     Average cost  $2,300*   *Cost varies based on your insurance coverage  For more information about hours, locations or appointment options av

## (undated) NOTE — MR AVS SNAPSHOT
After Visit Summary   7/14/2021    Ijeoma Dahl    MRN: [id number]           Visit Information     Date & Time  7/14/2021  3:30 PM Provider   [address] - Infusion Dept.  Phone  [phone]      Your [employer], please take a few minutes to complete it and provide feedback. We strive to deliver the best patient experience and are looking for ways to make improvements. Your feedback will help us do so.  For more information on Press Melanie, please visit intervention at a hospital emergency room.  Average cost  $2,300*   *Cost varies based on your insurance coverage  For more information about hours, locations or appointment options available at [facility],   visit [address]/ImmediateCare or call 1.88

## (undated) NOTE — MR AVS SNAPSHOT
[facility] SPECIAL[facility] - [place] [address] [address]  [address] [phone]               Thank you for choosing us for your health care visit with Cheli Melgar MD.  We are glad to serve you and happy to provide you with this summary of your usually diagnosed by a special procedure called a colonoscopy. The symptoms usually develop over time. There is no medicine that can cure ulcerative colitis. The goal of treatment is to reduce the symptoms, and cause a remission.   Symptoms of ulcerative co · Caffeine, alcohol, and stimulants may make symptoms worse. Lifestyle  Although stress doesn't cause IBD, it is a factor in flare-ups, and how you feel and react to your condition.   · Look for things that seem to make your symptoms worse, such as stress · Trouble breathing  · Confusion  · Very drowsy or trouble awakening  · Fainting or loss of consciousness  · Rapid heart rate  · Chest pain  When to seek medical advice  Call your healthcare provider right away if any of these occur:  · Bleeding from your [email or web address]. If you've recently had a stay at the Hospital you can access your discharge instructions in [employer] by going to Visits < Admission Summaries.  If you've been to the Emergency Department or your doctor's office, you can view yo

## (undated) NOTE — LETTER
[address], [place], [place]     AUTHORIZATION FOR SURGICAL OPERATION OR PROCEDURE    1.  I hereby authorize Dr. Lety Ortega MD, my Physician(s) and whomever may be designated as the doctor's Assistant, to perform the own blood, or a directed donor transfusion, I will discuss this with my Physician. 5. I consent to the photographing of procedure(s) to be performed for the purposes of advancing medicine, science and/or education, provided my identity is not revealed.  If _______________________________________________________________ ____________________________  (Witness signature)                                                                                                  (Date)                                (Time)

## (undated) NOTE — MR AVS SNAPSHOT
[facility]  [address] [place] [address]  [phone]               Thank you for choosing us for your health care visit with Mishel Hoffmann MD.  We are glad to serve you and happy to provide you with this summary of yo Take 2 capsules (800mg) by mouth three times a day   Commonly known as:  DELZICOL           * mesalamine 400 MG Cpdr   Take 2 capsules (800 mg total) by mouth 3 (three) times daily before meals.    Commonly known as:  DELZICOL           Ferrous Sulfate ER 1

## (undated) NOTE — ED AVS SNAPSHOT
[facility] Emergency Department    [address]     [address] [place] [address]    Phone:  [phone]    Fax:  [phone]           Jazmin Garsia   MRN: [id number]    Department:  [facility] Emergency Department   Date of Visit:  6/1 side effects. Medication List      START taking these medications     HYDROcodone-acetaminophen 5-325 MG Tabs   Quantity:  20 tablet   Commonly known as:  NORCO   Take 1-2 tablets by mouth every 4 (four) hours as needed for Pain.        Elizabeth a substitute for ongoing medical care. Often, one Emergency Department visit does not uncover every injury or illness.  If you have been referred to a primary care or a specialist physician for a follow-up visit, please tell this physician (or your personal [address]) [phone]   [place] [address] [employer]. ([address]) [address] [facility] [employer].  ([address] [facility]

## (undated) NOTE — MR AVS SNAPSHOT
[address]  [phone]               Thank you for choosing us for your health care visit with Samir Robison MD.  We are glad to serve you and happy to provide you with this summar Sulfacetamide Sodium (Acne) 10 % Lotn   Apply 1 Application topically daily.    Commonly known as:  Azul can access your MyChart to more actively manage your health care and view more details from t

## (undated) NOTE — LETTER
[facility]  Administration of Anesthesia  1. Kenisha Bernal, or _________________________________ acting on her behalf, (Patient) (Dependent/Representative) request to receive anesthesia for my pending procedure/operation/treatment. infections, high spinal block, spinal bleeding, seizure, cardiac arrest and death. 7. AWARENESS: I understand that it is possible (but unlikely) to have explicit memory of events from the operating room while under general anesthesia.   8. ELECTROCONVULSIV (Date) (Time)                                                                                               (Responsible person in case of minor/ unconscious pt) /Relationship    My signature below affirms that prior to the time of the procedure, I have ex

## (undated) NOTE — MR AVS SNAPSHOT
[facility]"" - [place] [address] [address]  [id number] [phone]               Thank you for choosing us for your health care visit with Ani Wakefield MD.  We are glad to serve you and happy to provide you with this summary of y Commonly known as:  DELZICOL           * mesalamine 400 MG Cpdr   Take 2 capsules (800 mg total) by mouth 3 (three) times daily before meals. Commonly known as:  DELZICOL           Ferrous Sulfate  (45 Fe) MG Tbcr   Take 1 tablet by mouth daily.

## (undated) NOTE — MR AVS SNAPSHOT
[address]                Thank you for choosing us for your health care visit with [facility] [place] W[facility] Nurse.   We are glad to serve you and happy to provide you with Commonly known as:  NORCO           Mycophenolate Mofetil 500 MG Tabs   Take 2 tablets (1,000 mg total) by mouth 2 (two) times daily. Commonly known as:  CELLCEPT           Pimecrolimus 1 % Crea   Apply 1 Application topically 2 (two) times daily.    Comm

## (undated) NOTE — LETTER
[address]  [facility] [place], [place], [place]  [address]  INFORMED CONSENT FOR TRANSFUSION OF BLOOD OR BLOOD PRODUCTS   My physician has informed me of the nature, purpose, benefits and risks of transfusion for blood and blood components indra (Signature of Patient)                                                           (Responsible party in case of Minor,                                                                                                Incompetent, or unconscious Patient)  _____

## (undated) NOTE — MR AVS SNAPSHOT
After Visit Summary   8/28/2019    Caitlyn Gonzalez    MRN: [id number]           Visit Information     Date & Time  8/28/2019  4:30 PM Provider  [facility] [address] - Infusion Dept.  Phone  [phone]      Your If you receive a survey from [employer], please take a few minutes to complete it and provide feedback. We strive to deliver the best patient experience and are looking for ways to make improvements. Your feedback will help us do so.  For more information EMERGENCY ROOM         Life-threatening emergencies needing immediate intervention at a hospital emergency room.     Average cost  $2,300*   *Cost varies based on your insurance coverage  For more information about hours, locations or appointment options av

## (undated) NOTE — LETTER
MAJOR CASE PREOPERATIVE INSTRUCTIONS    10/5/2017      Dear Alcides Garcia,    Your are having a total laparoscopic hysterectomy with bilataral oophorectomy with a possible total vaginal hysterectomy on 11/6/17 at 7:30am.    Do not eat or drink anything (includi Document electronically generated by:  Kyle Tyson

## (undated) NOTE — LETTER
04/12/21        [facility]      Dear Ant Garcia,    [facility] records indicate that you have outstanding lab work and or testing that was ordered for you and has not yet been completed:    VITAMIN D, 25-HYDROXY     To

## (undated) NOTE — LETTER
9/21/2017    Adam Curiel        [address]        [facility] [address]            Dear Adam Curiel,      [facility] records indicate that you are due for an appointment for a repeat colonoscopy on or about January 2018 with Dr Vini [place].     P

## (undated) NOTE — LETTER
[address], [place], [place]     AUTHORIZATION FOR SURGICAL OPERATION OR PROCEDURE    1.  I hereby authorize Dr. Tarah Polanco MD, my Physician(s) and whomever may be designated as the doctor's Assistant, to perform the f own blood, or a directed donor transfusion, I will discuss this with my Physician. 6. I consent to the photographing of procedure(s) to be performed for the purposes of advancing medicine, science and/or education, provided my identity is not revealed.  If _______________________________________________________________ ____________________________  (Witness signature)                                                                                                  (Date)                                (Time)

## (undated) NOTE — MR AVS SNAPSHOT
After Visit Summary   3/24/2021    Niall Lundberg    MRN: [id number]           Visit Information     Date & Time  3/24/2021  3:00 PM Provider  [address] - Infusion Dept.  Phone  [phone]      Your receive a survey from [employer] please take a few minutes to complete it and provide feedback. We strive to deliver the best patient experience and are looking for ways to make improvements. Your feedback will help us do so.  For more information on Pres emergencies needing immediate intervention at a hospital emergency room. Average cost  $2,300*   *Cost varies based on your insurance coverage  For more information about hours, locations or appointment options available at [facility],   visit [facility].

## (undated) NOTE — LETTER
June 11, 2017    Patient: Reddy Martino   Date of Visit: 6/11/2017       To Whom It May Concern:    Reddy Martino was seen and treated in our emergency department on 6/11/2017.  She is excused from work for the next 10 days or until cleared by [employer]

## (undated) NOTE — MR AVS SNAPSHOT
After Visit Summary   1/27/2021    Prashant Parent    MRN: [id number]           Visit Information     Date & Time  1/27/2021  3:00 PM Provider  [facility] [address] [place] [address] - Infusion Dept.  Phone  [phone]      Your best patient experience and are looking for ways to make improvements. Your feedback will help us do so. For more information on [employer], please visit [email or web address]                   DO YOU KNOW WHERE TO GO?   Injury & Ill coverage  For more information about hours, locations or appointment options available at [facility],   visit [employer]/ImmediateCare or call [phone]. MY.ANCA. ([phone])

## (undated) NOTE — MR AVS SNAPSHOT
After Visit Summary   5/19/2021    Elisabet Harrell    MRN: [id number]           Visit Information     Date & Time  5/19/2021  3:30 PM Provider   [address] - Infusion Dept.  Phone  [phone]      Your [email or web address]                   DO YOU KNOW WHERE TO GO? Injury & Illness are never convenient. If you are dealing with a   non-emergency, consider your options before heading to an ER.   VIDEO VISITS  Visit face-to-face with 1. 888.MY.ANCA. ([phone])

## (undated) NOTE — LETTER
12/10/2019              [address] [employer] [facility]          Dear Willye Osler,    As you saw there was no active colitis and no dysplasia or cancer on your biopsies.  At this time I would continue your current inflixima

## (undated) NOTE — LETTER
[facility]  Administration of Anesthesia  1. [place], or _________________________________ acting on her behalf, (Patient) (Dependent/Representative) request to receive anesthesia for my pending procedure/operation/treatment. bleeding, seizure, cardiac arrest and death. 7. AWARENESS: I understand that it is possible (but unlikely) to have explicit memory of events from the operating room while under general anesthesia.   8. ELECTROCONVULSIVE THERAPY PATIENTS: This consent serve below affirms that prior to the time of the procedure, I have explained to the patient and/or his/her guardian, the risks and benefits of undergoing anesthesia, as well as any reasonable alternatives.     ___________________________________________________

## (undated) NOTE — LETTER
January 11, 2018         Negin Cabrera MD  [address] [place] [address]      Patient: Brianna Owusu   YOB: 1950   Date of Visit: 1/11/2018       Dear Dr. Virgilio Garner MD,    I saw your patient, Brianna Owusu, on 1/11/2018.  E

## (undated) NOTE — LETTER
INSTRUCTIONS FOR PRE-SURGICAL   ANTIMICROBIAL BATH/SHOWER    Your doctor has recommended a pre-surgical CHG (chlorhexidine gluconate) shower/bath with Betasept (also sold as Hibiclens). It reduces bacteria that can potentially cause infection.   Betasept Keep out of reach of children. If swallowed get medica help or contact [facility]. Store between 60-80 degrees F. Fabric Warning! CHG WILL STAIN YOUR FABRICS! Use with care around shower curtains, towels washcloths rugs and clothes.   Wipe

## (undated) NOTE — ED AVS SNAPSHOT
[facility] Emergency Department    [place] [address]    Phone:  [phone]    Fax:  [phone]           Radha Ramirez   MRN: [id number]    Department:  [facility] Emergency Department   Date of Visit:  5/2 and Class Registration line at [phone] or find a doctor online by visiting [email or web address].    IF THERE IS ANY CHANGE OR WORSENING OF YOUR CONDITION, CALL YOUR PRIMARY CARE PHYSICIAN AT ONCE OR RETURN IMMEDIATELY TO [address] [place] [address].     If

## (undated) NOTE — MR AVS SNAPSHOT
After Visit Summary   2/19/2020    Donte Mayra    MRN: [id number]           Visit Information     Date & Time  2/19/2020  4:30 PM Provider  [facility] [address] [address] Dept.  Phone  [phone]      Your [facility] now offers Video Visits through [address] for adult and pediatric patients. Video Visits are available Monday - Friday for many common conditions such as allergies, colds, cough, fever, rash, sore throat, headache and pink eye.   The cost for a Video Vi Duy Elizalde   Monday – Friday  4:00 pm – 10:00 pm   Saturday – Sunday  10:00 am – 4:00 pm  WALK-IN CARE  Emergency Medicine Providers  Conditions needing urgent attention, but are   non-life-threatening.     Also available by appointment Average cost  $120*

## (undated) NOTE — LETTER
07/23/20        [facility]      Dear Evelio Mcintyre,    [facility] records indicate that you have outstanding lab work and or testing that was ordered for you and has not yet been completed:     XR DEXA BONE DENSITOMETRY

## (undated) NOTE — MR AVS SNAPSHOT
After Visit Summary   10/30/2019    Mohamud Aguilera    MRN: [id number]           Visit Information     Date & Time  10/30/2019  4:30 PM Provider  [facility] [address] [place] [address] - Infusion Dept.  Phone  [phone] If you receive a survey from [employer], please take a few minutes to complete it and provide feedback. We strive to deliver the best patient experience and are looking for ways to make improvements. Your feedback will help us do so.  For more information EMERGENCY ROOM         Life-threatening emergencies needing immediate intervention at a hospital emergency room.     Average cost  $2,300*   *Cost varies based on your insurance coverage  For more information about hours, locations or appointment options av

## (undated) NOTE — MR AVS SNAPSHOT
After Visit Summary   9/8/2021    Iris Fletcher   MRN: [id number]           Visit Information     Date & Time  9/8/2021  3:30 PM Provider  [facility] [address] [facility] Dept.  Phone  [phone]      Your Vi computer or mobile device wherever you are. Video Visits cost $50 and can be paid hassle-free using a credit, debit, or health savings card. Not active on [employer]? Ask us how to get signed up today!         If you receive a survey from [facility], please

## (undated) NOTE — LETTER
[address], [place]  Authorization for Invasive Procedures  1.  I hereby authorize Dr. Aarti Charlton , my physician and whomever may be designated as the doctor's assistant, to perform the following operation and/or procedure:  Colonoscop allergic reactions, hemolytic reactions, transmission of disease such as hepatitis, AIDS, cytomegalovirus (CMV), and flluid overload.  In the event that I wish to have autologous transfusions of my own blood, or a directed donor transfusion, I will discuss Patient:  ________________________________________________ Date: _________Time: _________    Responsible person in case of minor or unconscious: _____________________________Relationship: ____________     Witness Signature: ________________________________

## (undated) NOTE — MR AVS SNAPSHOT
After Visit Summary   11/3/2021    Dorita Frank   MRN: [id number]           Visit Information     Date & Time  11/3/2021  3:30 PM Provider  [facility] Dept.  Phone  [phone]      Your enabled computer or mobile device wherever you are. Video Visits cost $50 and can be paid hassle-free using a credit, debit, or health savings card. Not active on [employer]? Ask us how to get signed up today!         If you receive a survey from [facility],

## (undated) NOTE — ED AVS SNAPSHOT
[facility] Emergency Department    [address] [address]     [address] [address]    Phone:  [phone]    Fax:  [phone]           Shae Lerner   MRN: [id number]    Department:  [facility] Emergency Department   Date of Visit:  6/1 and Class Registration line at [phone] or find a doctor online by visiting [email or web address].    IF THERE IS ANY CHANGE OR WORSENING OF YOUR CONDITION, CALL YOUR PRIMARY CARE PHYSICIAN AT ONCE OR RETURN IMMEDIATELY TO [address] [place] [address].     If